# Patient Record
Sex: FEMALE | Race: WHITE | NOT HISPANIC OR LATINO | Employment: OTHER | URBAN - NONMETROPOLITAN AREA
[De-identification: names, ages, dates, MRNs, and addresses within clinical notes are randomized per-mention and may not be internally consistent; named-entity substitution may affect disease eponyms.]

---

## 2023-01-24 PROBLEM — N39.0 UTI (URINARY TRACT INFECTION): Status: ACTIVE | Noted: 2023-01-24

## 2023-01-24 PROBLEM — I10 HYPERTENSION: Status: ACTIVE | Noted: 2023-01-24

## 2023-01-24 PROBLEM — K59.09 CHRONIC CONSTIPATION: Status: ACTIVE | Noted: 2023-01-24

## 2023-01-24 PROBLEM — M43.6 STIFFNESS OF CERVICAL SPINE: Status: ACTIVE | Noted: 2023-01-24

## 2023-01-24 PROBLEM — R26.9 GAIT DIFFICULTY: Status: ACTIVE | Noted: 2023-01-24

## 2023-01-24 PROBLEM — M85.80 OSTEOPENIA: Status: ACTIVE | Noted: 2023-01-24

## 2023-01-24 PROBLEM — K59.09 CHRONIC CONSTIPATION: Status: RESOLVED | Noted: 2023-01-24 | Resolved: 2023-01-24

## 2023-01-24 PROBLEM — Z96.642 CHRONIC HIP PAIN AFTER TOTAL REPLACEMENT OF LEFT HIP JOINT: Status: ACTIVE | Noted: 2023-01-24

## 2023-01-24 PROBLEM — R32 URINARY INCONTINENCE IN FEMALE: Status: ACTIVE | Noted: 2023-01-24

## 2023-01-24 PROBLEM — R06.02 SOB (SHORTNESS OF BREATH) ON EXERTION: Status: ACTIVE | Noted: 2023-01-24

## 2023-01-24 PROBLEM — M25.551 RIGHT HIP PAIN: Status: RESOLVED | Noted: 2023-01-24 | Resolved: 2023-01-24

## 2023-01-24 PROBLEM — K44.9 HERNIA, HIATAL: Status: ACTIVE | Noted: 2023-01-24

## 2023-01-24 PROBLEM — N39.0 UTI (URINARY TRACT INFECTION): Status: RESOLVED | Noted: 2023-01-24 | Resolved: 2023-01-24

## 2023-01-24 PROBLEM — H91.90 HEARING LOSS: Status: ACTIVE | Noted: 2023-01-24

## 2023-01-24 PROBLEM — J30.9 ALLERGIC RHINITIS: Status: ACTIVE | Noted: 2023-01-24

## 2023-01-24 PROBLEM — R60.0 BILATERAL EDEMA OF LOWER EXTREMITY: Status: ACTIVE | Noted: 2023-01-24

## 2023-01-24 PROBLEM — R26.9 GAIT DIFFICULTY: Status: RESOLVED | Noted: 2023-01-24 | Resolved: 2023-01-24

## 2023-01-24 PROBLEM — G89.29 CHRONIC HIP PAIN AFTER TOTAL REPLACEMENT OF LEFT HIP JOINT: Status: ACTIVE | Noted: 2023-01-24

## 2023-01-24 PROBLEM — I25.10 CAD (CORONARY ARTERY DISEASE): Status: ACTIVE | Noted: 2023-01-24

## 2023-01-24 PROBLEM — M25.551 RIGHT HIP PAIN: Status: ACTIVE | Noted: 2023-01-24

## 2023-01-24 PROBLEM — M48.061 LUMBAR SPINAL STENOSIS: Status: ACTIVE | Noted: 2023-01-24

## 2023-01-24 PROBLEM — J30.9 ALLERGIC RHINITIS: Status: RESOLVED | Noted: 2023-01-24 | Resolved: 2023-01-24

## 2023-01-24 PROBLEM — H61.23 BILATERAL IMPACTED CERUMEN: Status: ACTIVE | Noted: 2023-01-24

## 2023-01-24 PROBLEM — D64.9 ANEMIA: Status: ACTIVE | Noted: 2023-01-24

## 2023-01-24 PROBLEM — G25.81 RESTLESS LEG SYNDROME: Status: ACTIVE | Noted: 2023-01-24

## 2023-01-24 PROBLEM — R32 URINARY INCONTINENCE IN FEMALE: Status: RESOLVED | Noted: 2023-01-24 | Resolved: 2023-01-24

## 2023-01-24 PROBLEM — M25.552 CHRONIC HIP PAIN AFTER TOTAL REPLACEMENT OF LEFT HIP JOINT: Status: ACTIVE | Noted: 2023-01-24

## 2023-01-24 PROBLEM — R06.02 SOB (SHORTNESS OF BREATH) ON EXERTION: Status: RESOLVED | Noted: 2023-01-24 | Resolved: 2023-01-24

## 2023-01-24 RX ORDER — FLUTICASONE PROPIONATE 50 MCG
1 SPRAY, SUSPENSION (ML) NASAL 2 TIMES DAILY
COMMUNITY
Start: 2019-11-19

## 2023-01-24 RX ORDER — FUROSEMIDE 40 MG/1
20 TABLET ORAL DAILY
COMMUNITY
Start: 2022-02-07 | End: 2023-03-29 | Stop reason: SDUPTHER

## 2023-01-24 RX ORDER — ATORVASTATIN CALCIUM 20 MG/1
20 TABLET, FILM COATED ORAL
COMMUNITY
Start: 2018-09-21 | End: 2023-10-30 | Stop reason: ALTCHOICE

## 2023-01-24 RX ORDER — CARVEDILOL 12.5 MG/1
12.5 TABLET ORAL
COMMUNITY
End: 2023-11-22 | Stop reason: SDUPTHER

## 2023-01-24 RX ORDER — NALOXONE HYDROCHLORIDE 4 MG/.1ML
4 SPRAY NASAL AS NEEDED
COMMUNITY
Start: 2020-05-21

## 2023-01-24 RX ORDER — PRAMIPEXOLE DIHYDROCHLORIDE 0.5 MG/1
1 TABLET ORAL 2 TIMES DAILY
COMMUNITY
Start: 2018-12-28 | End: 2023-03-29 | Stop reason: SDUPTHER

## 2023-01-24 RX ORDER — NITROGLYCERIN 0.4 MG/1
0.4 TABLET SUBLINGUAL EVERY 5 MIN PRN
COMMUNITY
End: 2024-05-06 | Stop reason: SDUPTHER

## 2023-01-24 RX ORDER — LORATADINE 10 MG/1
1 TABLET ORAL DAILY PRN
COMMUNITY

## 2023-01-24 RX ORDER — ISOSORBIDE MONONITRATE 30 MG/1
1 TABLET, EXTENDED RELEASE ORAL EVERY MORNING
COMMUNITY
End: 2023-09-26 | Stop reason: ALTCHOICE

## 2023-01-24 RX ORDER — ASPIRIN 81 MG/1
81 TABLET ORAL DAILY
COMMUNITY

## 2023-01-24 RX ORDER — OXYCODONE HYDROCHLORIDE 10 MG/1
1 TABLET ORAL 3 TIMES DAILY
COMMUNITY
Start: 2019-10-28 | End: 2023-03-16 | Stop reason: SDUPTHER

## 2023-01-24 RX ORDER — CLOPIDOGREL BISULFATE 75 MG/1
75 TABLET ORAL DAILY
COMMUNITY
Start: 2018-09-21 | End: 2023-09-26 | Stop reason: SDUPTHER

## 2023-01-24 RX ORDER — AMLODIPINE BESYLATE 10 MG/1
10 TABLET ORAL DAILY
COMMUNITY
End: 2023-11-22 | Stop reason: SDUPTHER

## 2023-01-24 RX ORDER — LISINOPRIL 10 MG/1
20 TABLET ORAL DAILY
COMMUNITY
End: 2024-01-24 | Stop reason: SDUPTHER

## 2023-01-24 RX ORDER — PANTOPRAZOLE SODIUM 40 MG/1
1 TABLET, DELAYED RELEASE ORAL DAILY
COMMUNITY
Start: 2018-02-09 | End: 2024-01-24 | Stop reason: SDUPTHER

## 2023-01-25 RX ORDER — ROSUVASTATIN CALCIUM 20 MG/1
20 TABLET, COATED ORAL DAILY
COMMUNITY
End: 2023-10-30 | Stop reason: ALTCHOICE

## 2023-03-08 ENCOUNTER — APPOINTMENT (OUTPATIENT)
Dept: PRIMARY CARE | Facility: CLINIC | Age: 83
End: 2023-03-08
Payer: MEDICARE

## 2023-03-16 DIAGNOSIS — M48.061 SPINAL STENOSIS OF LUMBAR REGION, UNSPECIFIED WHETHER NEUROGENIC CLAUDICATION PRESENT: ICD-10-CM

## 2023-03-16 RX ORDER — OXYCODONE HYDROCHLORIDE 10 MG/1
10 TABLET ORAL 3 TIMES DAILY
Qty: 90 TABLET | Refills: 0 | Status: SHIPPED | OUTPATIENT
Start: 2023-03-16 | End: 2023-04-19 | Stop reason: SDUPTHER

## 2023-03-16 NOTE — TELEPHONE ENCOUNTER
PT CALLED IN STATING SHE HAS LEFT A MESSAGE AND NO ONE HAS GOTTEN BACK WITH HER. PT IS REQUESTING A REFILL ON HER SCRIPT  FOR OXYCODONE TO PLEASE BE SENT TO [facility]. PT STATES SHE IS COMPLETELY OUT. THANK YOU

## 2023-03-27 ENCOUNTER — APPOINTMENT (OUTPATIENT)
Dept: PRIMARY CARE | Facility: CLINIC | Age: 83
End: 2023-03-27
Payer: MEDICARE

## 2023-03-29 ENCOUNTER — OFFICE VISIT (OUTPATIENT)
Dept: PRIMARY CARE | Facility: CLINIC | Age: 83
End: 2023-03-29
Payer: MEDICARE

## 2023-03-29 VITALS
DIASTOLIC BLOOD PRESSURE: 88 MMHG | WEIGHT: 143.8 LBS | HEIGHT: 65 IN | BODY MASS INDEX: 23.96 KG/M2 | SYSTOLIC BLOOD PRESSURE: 128 MMHG | OXYGEN SATURATION: 99 % | HEART RATE: 68 BPM

## 2023-03-29 DIAGNOSIS — G25.81 RESTLESS LEG SYNDROME: ICD-10-CM

## 2023-03-29 DIAGNOSIS — R60.0 BILATERAL EDEMA OF LOWER EXTREMITY: ICD-10-CM

## 2023-03-29 DIAGNOSIS — M48.062 SPINAL STENOSIS OF LUMBAR REGION WITH NEUROGENIC CLAUDICATION: Primary | ICD-10-CM

## 2023-03-29 DIAGNOSIS — L82.0 INFLAMED SEBORRHEIC KERATOSIS: ICD-10-CM

## 2023-03-29 PROBLEM — H61.23 BILATERAL IMPACTED CERUMEN: Status: RESOLVED | Noted: 2023-01-24 | Resolved: 2023-03-29

## 2023-03-29 PROBLEM — M48.061 LUMBAR SPINAL STENOSIS: Status: RESOLVED | Noted: 2023-01-24 | Resolved: 2023-03-29

## 2023-03-29 PROBLEM — H91.90 HEARING LOSS: Status: RESOLVED | Noted: 2023-01-24 | Resolved: 2023-03-29

## 2023-03-29 PROCEDURE — 1036F TOBACCO NON-USER: CPT | Performed by: FAMILY MEDICINE

## 2023-03-29 PROCEDURE — 1159F MED LIST DOCD IN RCRD: CPT | Performed by: FAMILY MEDICINE

## 2023-03-29 PROCEDURE — 3079F DIAST BP 80-89 MM HG: CPT | Performed by: FAMILY MEDICINE

## 2023-03-29 PROCEDURE — 99214 OFFICE O/P EST MOD 30 MIN: CPT | Performed by: FAMILY MEDICINE

## 2023-03-29 PROCEDURE — 3074F SYST BP LT 130 MM HG: CPT | Performed by: FAMILY MEDICINE

## 2023-03-29 RX ORDER — FUROSEMIDE 40 MG/1
20 TABLET ORAL DAILY
Qty: 15 TABLET | Refills: 11 | Status: SHIPPED | OUTPATIENT
Start: 2023-03-29 | End: 2023-06-26 | Stop reason: ALTCHOICE

## 2023-03-29 RX ORDER — PRAMIPEXOLE DIHYDROCHLORIDE 0.5 MG/1
0.5 TABLET ORAL 2 TIMES DAILY
Qty: 60 TABLET | Refills: 11 | Status: SHIPPED | OUTPATIENT
Start: 2023-03-29 | End: 2024-03-08 | Stop reason: SDUPTHER

## 2023-03-29 ASSESSMENT — ENCOUNTER SYMPTOMS
PALPITATIONS: 0
OCCASIONAL FEELINGS OF UNSTEADINESS: 0
SHORTNESS OF BREATH: 1
DEPRESSION: 0
COUGH: 0
LOSS OF SENSATION IN FEET: 0
WHEEZING: 0

## 2023-03-29 ASSESSMENT — PATIENT HEALTH QUESTIONNAIRE - PHQ9
2. FEELING DOWN, DEPRESSED OR HOPELESS: NOT AT ALL
SUM OF ALL RESPONSES TO PHQ9 QUESTIONS 1 AND 2: 0
1. LITTLE INTEREST OR PLEASURE IN DOING THINGS: NOT AT ALL

## 2023-03-29 ASSESSMENT — LIFESTYLE VARIABLES: TOTAL SCORE: 0

## 2023-03-29 NOTE — PATIENT INSTRUCTIONS

## 2023-03-29 NOTE — PROGRESS NOTES
OARRS:  Sami Leggett MD on 3/29/2023  8:05 AM  I have personally reviewed the OARRS report for Tierney Madrid. I have considered the risks of abuse, dependence, addiction and diversion    Is the patient prescribed a combination of a benzodiazepine and opioid?  No    Last Urine Drug Screen / ordered today: No  Recent Results (from the past 62173 hour(s))   OPIATE/OPIOID/BENZO PRESCRIPTION COMPLIANCE    Collection Time: 05/09/22  2:00 PM   Result Value Ref Range    DRUG SCREEN COMMENT URINE SEE BELOW     Creatine, Urine 60.0 mg/dL    Amphetamine Screen, Urine PRESUMPTIVE NEGATIVE NEGATIVE    Barbiturate Screen, Urine PRESUMPTIVE NEGATIVE NEGATIVE    Cannabinoid Screen, Urine PRESUMPTIVE NEGATIVE NEGATIVE    Cocaine Screen, Urine PRESUMPTIVE NEGATIVE NEGATIVE    PCP Screen, Urine PRESUMPTIVE NEGATIVE NEGATIVE    7-Aminoclonazepam <25 Cutoff <25 ng/mL    Alpha-Hydroxyalprazolam <25 Cutoff <25 ng/mL    Alpha-Hydroxymidazolam <25 Cutoff <25 ng/mL    Alprazolam <25 Cutoff <25 ng/mL    Chlordiazepoxide <25 Cutoff <25 ng/mL    Clonazepam <25 Cutoff <25 ng/mL    Diazepam <25 Cutoff <25 ng/mL    Lorazepam <25 Cutoff <25 ng/mL    Midazolam <25 Cutoff <25 ng/mL    Nordiazepam <25 Cutoff <25 ng/mL    Oxazepam <25 Cutoff <25 ng/mL    Temazepam <25 Cutoff <25 ng/mL    Zolpidem <25 Cutoff <25 ng/mL    Zolpidem Metabolite (ZCA) <25 Cutoff <25 ng/mL    6-Acetylmorphine <25 Cutoff <25 ng/mL    Codeine <50 Cutoff <50 ng/mL    Hydrocodone <25 Cutoff <25 ng/mL    Hydromorphone <25 Cutoff <25 ng/mL    Morphine Urine <50 Cutoff <50 ng/mL    Norhydrocodone <25 Cutoff <25 ng/mL    Noroxycodone >1000 (A) Cutoff <25 ng/mL    Oxycodone >2500 (A) Cutoff <25 ng/mL    Oxymorphone >2500 (A) Cutoff <25 ng/mL    Tramadol <50 Cutoff <50 ng/mL    O-Desmethyltramadol <50 Cutoff <50 ng/mL    Fentanyl <2.5 Cutoff<2.5 ng/mL    Norfentanyl <2.5 Cutoff<2.5 ng/mL    METHADONE CONFIRMATION,URINE <25 Cutoff <25 ng/mL    EDDP <25 Cutoff <25 ng/mL     Results  are as expected.     Controlled Substance Agreement:  Date of the Last Agreement:   Reviewed Controlled Substance Agreement including but not limited to the benefits, risks, and alternatives to treatment with a Controlled Substance medication(s).    Opioids:  What is the patient's goal of therapy? To relieve pain and increase function  Is this being achieved with current treatment? yes    I have calculated the patient's Morphine Dose Equivalent (MED):   I have considered referral to Pain Management and/or a specialist, and do not feel it is necessary at this time.    I feel that it is clinically indicated to continue this current medication regimen after consideration of alternative therapies, and other non-opioid treatment.    Opioid Risk Screening:  Family History of Substance Abuse  Alcohol: 0 (3/29/2023  8:00 AM)  Illegal Dru (3/29/2023  8:00 AM)  Prescription Dru (3/29/2023  8:00 AM)    Personal History of Substance Abuse  Alcohol: 0 (3/29/2023  8:00 AM)  Illegal Drugs: 0 (3/29/2023  8:00 AM)  Prescription Drugs: 0 (3/29/2023  8:00 AM)    Patient Age (16-45)  Age (16-45): 0 (3/29/2023  8:00 AM)    History of Preadolescent Sexual Abuse  History of Preadolescent Sexual Abuse: 0 (3/29/2023  8:00 AM)    Psychological Disease  Attention Deficit Disorder, Obsessive Compulsive Disorder, Bipolar, Schizophrenia: 0 (3/29/2023  8:00 AM)  Depression: 0 (3/29/2023  8:00 AM)    Total Score  Total Score: 0 (3/29/2023  8:00 AM)    Total Score Risk Category  TOTAL SCORE CATEGORY: Low Risk (0-3) (3/29/2023  8:00 AM)        Pain Assessment:  Analgesia  What was your pain level on average during the past week?: 9  What was your pain level at its worst during the past week?: 10 - Pain as bad as it can be  What percentage of your pain has been relieved during the past week?: 80 %  Is the amount of pain relief you are now obtaining from your current pain relievers enough to make a real difference in your life?:  "Y  Query to Clinician: Is the patient's pain relief clinically significant?: Yes    Activities of Daily Living  Physical Functioning: Same  Family Relationships: Same  Social Relationships: Same  Mood: Same  Sleep Patterns: Same  Overall Functioning: Same    Adverse Events  Is patient experiencing any side effects from current pain relievers?: N  Patient's Overall Severity of Side Effects: None      Assessment  Is your overall impression that this patient is benefiting from opioid therapy?: Yes  Specific Analgesic Plan: Continue present regimen    Subjective   Patient ID: Tierney Madrid is a 83 y.o. female who presents for 3 mth ov (Has a mole on right side bra line area she would like looked at ).    HPI   HTN    good control with current meds     RLS     Mirapex is helping     edema no changes in edema or wt gain    Controlled with lasix      stale angina    changed cardilogy to  next appt with Dr Maguire   cath with PCI in mid LAD      anemia chronic disease   not iron deficient or b12        Review of Systems   Respiratory:  Positive for shortness of breath. Negative for cough and wheezing.    Cardiovascular:  Negative for chest pain, palpitations and leg swelling.   Skin:         Right upper abd lesion x 6 months changing getting bigger no bleeding catches on bra and clothing        Objective   /88 (BP Location: Left arm, Patient Position: Sitting)   Pulse 68   Ht 1.638 m (5' 4.5\")   Wt 65.2 kg (143 lb 12.8 oz)   SpO2 99%   BMI 24.30 kg/m²     Physical Exam  Vitals reviewed.   Constitutional:       Appearance: Normal appearance.   HENT:      Head: Normocephalic and atraumatic.   Eyes:      Conjunctiva/sclera: Conjunctivae normal.   Cardiovascular:      Rate and Rhythm: Normal rate and regular rhythm.   Pulmonary:      Effort: Pulmonary effort is normal.      Breath sounds: Normal breath sounds.   Musculoskeletal:      Cervical back: Neck supple.      Comments: Gen weakness with standing with slow " gait   Skin:     General: Skin is warm and dry.   Neurological:      General: No focal deficit present.      Mental Status: She is alert and oriented to person, place, and time.      Deep Tendon Reflexes: Reflexes normal.   Psychiatric:         Mood and Affect: Mood normal.         Behavior: Behavior normal.         Thought Content: Thought content normal.         Judgment: Judgment normal.         Assessment/Plan   Problem List Items Addressed This Visit          Nervous    Restless leg syndrome    RESOLVED: Lumbar spinal stenosis - Primary       Musculoskeletal    Bilateral edema of lower extremity     Other Visit Diagnoses       Inflamed seborrheic keratosis                 Patient was identified as a fall risk. Risk prevention instructions provided.

## 2023-04-11 ENCOUNTER — PROCEDURE VISIT (OUTPATIENT)
Dept: PRIMARY CARE | Facility: CLINIC | Age: 83
End: 2023-04-11
Payer: MEDICARE

## 2023-04-11 DIAGNOSIS — L82.0 INFLAMED SEBORRHEIC KERATOSIS: Primary | ICD-10-CM

## 2023-04-11 DIAGNOSIS — M48.062 SPINAL STENOSIS OF LUMBAR REGION WITH NEUROGENIC CLAUDICATION: ICD-10-CM

## 2023-04-11 PROCEDURE — 17110 DESTRUCTION B9 LES UP TO 14: CPT | Performed by: FAMILY MEDICINE

## 2023-04-11 NOTE — PROGRESS NOTES
Patient ID: Tierney Madrid is a 83 y.o. female.    Procedures    Patient here to remove a 1 cm x 3 cm seborrheic keratosis right upper quadrant.  After informed written consent skin was prepped without alcohol  and Betadine 1.5 mL of 1.5% lidocaine with epi infiltrated over the base.  Using a #3 curette the seborrheic keratosis was scraped off electrodesiccation used to control hemostasis recuretted and cauterized a second and third time.  Patient tolerated the procedure well.        2. Inflamed seborrheic keratosis  Procedure only

## 2023-04-19 DIAGNOSIS — M48.061 SPINAL STENOSIS OF LUMBAR REGION, UNSPECIFIED WHETHER NEUROGENIC CLAUDICATION PRESENT: Primary | ICD-10-CM

## 2023-04-19 RX ORDER — OXYCODONE HYDROCHLORIDE 10 MG/1
10 TABLET ORAL 3 TIMES DAILY
Qty: 90 TABLET | Refills: 0 | Status: SHIPPED | OUTPATIENT
Start: 2023-04-19 | End: 2023-05-18 | Stop reason: SDUPTHER

## 2023-04-19 NOTE — TELEPHONE ENCOUNTER
Refill for  oxyCODONE (Roxicodone) 10 mg immediate release tablet needs to be sent to [facility]

## 2023-04-24 ENCOUNTER — OFFICE VISIT (OUTPATIENT)
Dept: PRIMARY CARE | Facility: CLINIC | Age: 83
End: 2023-04-24
Payer: MEDICARE

## 2023-04-24 VITALS
BODY MASS INDEX: 23.57 KG/M2 | HEIGHT: 65 IN | DIASTOLIC BLOOD PRESSURE: 62 MMHG | HEART RATE: 73 BPM | SYSTOLIC BLOOD PRESSURE: 118 MMHG | WEIGHT: 141.5 LBS

## 2023-04-24 DIAGNOSIS — T14.8XXA WOUND OF SKIN: Primary | ICD-10-CM

## 2023-04-24 PROCEDURE — 3078F DIAST BP <80 MM HG: CPT | Performed by: NURSE PRACTITIONER

## 2023-04-24 PROCEDURE — 3074F SYST BP LT 130 MM HG: CPT | Performed by: NURSE PRACTITIONER

## 2023-04-24 PROCEDURE — 1036F TOBACCO NON-USER: CPT | Performed by: NURSE PRACTITIONER

## 2023-04-24 PROCEDURE — 99213 OFFICE O/P EST LOW 20 MIN: CPT | Performed by: NURSE PRACTITIONER

## 2023-04-24 PROCEDURE — 1159F MED LIST DOCD IN RCRD: CPT | Performed by: NURSE PRACTITIONER

## 2023-04-24 RX ORDER — SILVER SULFADIAZINE 10 G/1000G
CREAM TOPICAL
Qty: 20 G | Refills: 0 | Status: SHIPPED | OUTPATIENT
Start: 2023-04-24 | End: 2023-06-26 | Stop reason: ALTCHOICE

## 2023-04-24 NOTE — PROGRESS NOTES
"Subjective   Patient ID: Tierney Madrid is a 83 y.o. female who presents for Skin Tag (Spot that was removed under right breast is irritated.).    On 4/11/23 had removal of seborrheic keratosis right upper quadrant with Dr. Leggett  She is concerned for wound infection, as there has been some drainage and mild pain  Wound has been covered with Band-Aid which is also causing some irritation.  Wound is just under right breast         Review of Systems    Objective   /62 (Patient Position: Sitting)   Pulse 73   Ht 1.638 m (5' 4.5\")   Wt 64.2 kg (141 lb 8 oz)   BMI 23.91 kg/m²     Physical Exam  Constitutional:       Appearance: Normal appearance. She is normal weight.   Cardiovascular:      Rate and Rhythm: Normal rate and regular rhythm.   Pulmonary:      Effort: Pulmonary effort is normal.   Skin:     General: Skin is warm and dry.      Comments: Wound to right under breast, previous skin tag removal with cauterization: wound bed maceration, edges erythema, surrounding tissue pink, no swelling noted.   1.5 X2.0 cm   Neurological:      Mental Status: She is alert and oriented to person, place, and time.         Assessment/Plan   Diagnoses and all orders for this visit:  Wound of skin  -     silver sulfADIAZINE (Silvadene) 1 % cream; Apply to affected area twice a day or with each dressing change.   - Keep wound clean, apply dry gauze loosely, secure with paper tape.     Return is S/Sx of infection occur including increased pain, swelling, redness, fever, or increased drainage     "

## 2023-05-18 DIAGNOSIS — M48.061 SPINAL STENOSIS OF LUMBAR REGION, UNSPECIFIED WHETHER NEUROGENIC CLAUDICATION PRESENT: ICD-10-CM

## 2023-05-18 RX ORDER — OXYCODONE HYDROCHLORIDE 10 MG/1
10 TABLET ORAL 3 TIMES DAILY
Qty: 90 TABLET | Refills: 0 | Status: SHIPPED | OUTPATIENT
Start: 2023-05-18 | End: 2023-06-19 | Stop reason: SDUPTHER

## 2023-06-19 ENCOUNTER — TELEPHONE (OUTPATIENT)
Dept: PRIMARY CARE | Facility: CLINIC | Age: 83
End: 2023-06-19
Payer: MEDICARE

## 2023-06-19 DIAGNOSIS — M48.061 SPINAL STENOSIS OF LUMBAR REGION, UNSPECIFIED WHETHER NEUROGENIC CLAUDICATION PRESENT: ICD-10-CM

## 2023-06-19 RX ORDER — OXYCODONE HYDROCHLORIDE 10 MG/1
10 TABLET ORAL 3 TIMES DAILY
Qty: 90 TABLET | Refills: 0 | Status: SHIPPED | OUTPATIENT
Start: 2023-06-19 | End: 2023-07-18 | Stop reason: SDUPTHER

## 2023-06-26 ENCOUNTER — OFFICE VISIT (OUTPATIENT)
Dept: PRIMARY CARE | Facility: CLINIC | Age: 83
End: 2023-06-26
Payer: MEDICARE

## 2023-06-26 VITALS
OXYGEN SATURATION: 98 % | DIASTOLIC BLOOD PRESSURE: 68 MMHG | HEART RATE: 72 BPM | SYSTOLIC BLOOD PRESSURE: 118 MMHG | BODY MASS INDEX: 23.76 KG/M2 | WEIGHT: 142.6 LBS | HEIGHT: 65 IN

## 2023-06-26 DIAGNOSIS — G25.81 RESTLESS LEG SYNDROME: ICD-10-CM

## 2023-06-26 DIAGNOSIS — F51.01 PRIMARY INSOMNIA: ICD-10-CM

## 2023-06-26 DIAGNOSIS — M48.062 SPINAL STENOSIS OF LUMBAR REGION WITH NEUROGENIC CLAUDICATION: ICD-10-CM

## 2023-06-26 DIAGNOSIS — R60.0 BILATERAL EDEMA OF LOWER EXTREMITY: ICD-10-CM

## 2023-06-26 DIAGNOSIS — Z00.00 ROUTINE GENERAL MEDICAL EXAMINATION AT HEALTH CARE FACILITY: Primary | ICD-10-CM

## 2023-06-26 DIAGNOSIS — Z51.81 MEDICATION MONITORING ENCOUNTER: ICD-10-CM

## 2023-06-26 PROCEDURE — 99214 OFFICE O/P EST MOD 30 MIN: CPT | Performed by: FAMILY MEDICINE

## 2023-06-26 PROCEDURE — G0439 PPPS, SUBSEQ VISIT: HCPCS | Performed by: FAMILY MEDICINE

## 2023-06-26 PROCEDURE — 3078F DIAST BP <80 MM HG: CPT | Performed by: FAMILY MEDICINE

## 2023-06-26 PROCEDURE — 80307 DRUG TEST PRSMV CHEM ANLYZR: CPT

## 2023-06-26 PROCEDURE — 80358 DRUG SCREENING METHADONE: CPT

## 2023-06-26 PROCEDURE — 1160F RVW MEDS BY RX/DR IN RCRD: CPT | Performed by: FAMILY MEDICINE

## 2023-06-26 PROCEDURE — 1159F MED LIST DOCD IN RCRD: CPT | Performed by: FAMILY MEDICINE

## 2023-06-26 PROCEDURE — 80354 DRUG SCREENING FENTANYL: CPT

## 2023-06-26 PROCEDURE — 1036F TOBACCO NON-USER: CPT | Performed by: FAMILY MEDICINE

## 2023-06-26 PROCEDURE — 3074F SYST BP LT 130 MM HG: CPT | Performed by: FAMILY MEDICINE

## 2023-06-26 PROCEDURE — 80361 OPIATES 1 OR MORE: CPT

## 2023-06-26 PROCEDURE — 1170F FXNL STATUS ASSESSED: CPT | Performed by: FAMILY MEDICINE

## 2023-06-26 PROCEDURE — 80346 BENZODIAZEPINES1-12: CPT

## 2023-06-26 PROCEDURE — 80365 DRUG SCREENING OXYCODONE: CPT

## 2023-06-26 PROCEDURE — 80373 DRUG SCREENING TRAMADOL: CPT

## 2023-06-26 PROCEDURE — 80368 SEDATIVE HYPNOTICS: CPT

## 2023-06-26 RX ORDER — TRAZODONE HYDROCHLORIDE 50 MG/1
50 TABLET ORAL NIGHTLY
Qty: 30 TABLET | Refills: 0 | Status: SHIPPED | OUTPATIENT
Start: 2023-06-26 | End: 2023-10-20 | Stop reason: WASHOUT

## 2023-06-26 RX ORDER — FUROSEMIDE 20 MG/1
20 TABLET ORAL DAILY
Qty: 30 TABLET | Refills: 11 | Status: SHIPPED | OUTPATIENT
Start: 2023-06-26 | End: 2024-05-20

## 2023-06-26 ASSESSMENT — ENCOUNTER SYMPTOMS
CONSTIPATION: 0
DIARRHEA: 0
COUGH: 0
HEMATURIA: 0
NAUSEA: 0
DYSURIA: 0
ABDOMINAL PAIN: 0

## 2023-06-26 ASSESSMENT — PATIENT HEALTH QUESTIONNAIRE - PHQ9
SUM OF ALL RESPONSES TO PHQ9 QUESTIONS 1 AND 2: 0
2. FEELING DOWN, DEPRESSED OR HOPELESS: NOT AT ALL
1. LITTLE INTEREST OR PLEASURE IN DOING THINGS: NOT AT ALL

## 2023-06-26 ASSESSMENT — ACTIVITIES OF DAILY LIVING (ADL)
TAKING_MEDICATION: INDEPENDENT
GROCERY_SHOPPING: NEEDS ASSISTANCE
MANAGING_FINANCES: INDEPENDENT
DRESSING: INDEPENDENT
BATHING: INDEPENDENT
DOING_HOUSEWORK: NEEDS ASSISTANCE

## 2023-06-26 NOTE — PATIENT INSTRUCTIONS

## 2023-06-26 NOTE — PROGRESS NOTES
Subjective   Reason for Visit: Tierney Madrid is an 83 y.o. female here for a Medicare Wellness visit.     Past Medical, Surgical, and Family History reviewed and updated in chart.    Reviewed all medications by prescribing practitioner or clinical pharmacist (such as prescriptions, OTCs, herbal therapies and supplements) and documented in the medical record.    HPI  OARRS:  Sami Leggett MD on 6/26/23  I have personally reviewed the OARRS report for Tierney Madrid. I have considered the risks of abuse, dependence, addiction and diversion     Is the patient prescribed a combination of a benzodiazepine and opioid?  No     Last Urine Drug Screen / ordered today: yes  Recent Results         Recent Results (from the past 36917 hour(s))   OPIATE/OPIOID/BENZO PRESCRIPTION COMPLIANCE     Collection Time: 05/09/22  2:00 PM   Result Value Ref Range     DRUG SCREEN COMMENT URINE SEE BELOW       Creatine, Urine 60.0 mg/dL     Amphetamine Screen, Urine PRESUMPTIVE NEGATIVE NEGATIVE     Barbiturate Screen, Urine PRESUMPTIVE NEGATIVE NEGATIVE     Cannabinoid Screen, Urine PRESUMPTIVE NEGATIVE NEGATIVE     Cocaine Screen, Urine PRESUMPTIVE NEGATIVE NEGATIVE     PCP Screen, Urine PRESUMPTIVE NEGATIVE NEGATIVE     7-Aminoclonazepam <25 Cutoff <25 ng/mL     Alpha-Hydroxyalprazolam <25 Cutoff <25 ng/mL     Alpha-Hydroxymidazolam <25 Cutoff <25 ng/mL     Alprazolam <25 Cutoff <25 ng/mL     Chlordiazepoxide <25 Cutoff <25 ng/mL     Clonazepam <25 Cutoff <25 ng/mL     Diazepam <25 Cutoff <25 ng/mL     Lorazepam <25 Cutoff <25 ng/mL     Midazolam <25 Cutoff <25 ng/mL     Nordiazepam <25 Cutoff <25 ng/mL     Oxazepam <25 Cutoff <25 ng/mL     Temazepam <25 Cutoff <25 ng/mL     Zolpidem <25 Cutoff <25 ng/mL     Zolpidem Metabolite (ZCA) <25 Cutoff <25 ng/mL     6-Acetylmorphine <25 Cutoff <25 ng/mL     Codeine <50 Cutoff <50 ng/mL     Hydrocodone <25 Cutoff <25 ng/mL     Hydromorphone <25 Cutoff <25 ng/mL     Morphine Urine <50 Cutoff <50  ng/mL     Norhydrocodone <25 Cutoff <25 ng/mL     Noroxycodone >1000 (A) Cutoff <25 ng/mL     Oxycodone >2500 (A) Cutoff <25 ng/mL     Oxymorphone >2500 (A) Cutoff <25 ng/mL     Tramadol <50 Cutoff <50 ng/mL     O-Desmethyltramadol <50 Cutoff <50 ng/mL     Fentanyl <2.5 Cutoff<2.5 ng/mL     Norfentanyl <2.5 Cutoff<2.5 ng/mL     METHADONE CONFIRMATION,URINE <25 Cutoff <25 ng/mL     EDDP <25 Cutoff <25 ng/mL         Results are as expected.      Controlled Substance Agreement:  Date of the Last Agreement: Feb 29, 2023  Reviewed Controlled Substance Agreement including but not limited to the benefits, risks, and alternatives to treatment with a Controlled Substance medication(s).     Opioids:  What is the patient's goal of therapy? To relieve pain and increase function  Is this being achieved with current treatment? yes     I have calculated the patient's Morphine Dose Equivalent (MED):   I have considered referral to Pain Management and/or a specialist, and do not feel it is necessary at this time.     I feel that it is clinically indicated to continue this current medication regimen after consideration of alternative therapies, and other non-opioid treatment.     Opioid Risk Screening: 3/39/23     Pain Assessment:  Analgesia  What was your pain level on average during the past week?: 9  What was your pain level at its worst during the past week?: 10 - Pain as bad as it can be  What percentage of your pain has been relieved during the past week?: 80 %  Is the amount of pain relief you are now obtaining from your current pain relievers enough to make a real difference in your life?: Y  Query to Clinician: Is the patient's pain relief clinically significant?: Yes     Activities of Daily Living  Physical Functioning: Same  Family Relationships: Same  Social Relationships: Same  Mood: Same  Sleep Patterns: Same  Overall Functioning: Same     Adverse Events  Is patient experiencing any side effects from current pain  relievers?: N  Patient's Overall Severity of Side Effects: None        Assessment  Is your overall impression that this patient is benefiting from opioid therapy?: Yes  Specific Analgesic Plan: Continue present regimen  Patient Care Team:  Sami Leggett MD as PCP - General     Expand All Collapse All  OARRS:  Sami Leggett MD on 3/29/2023  8:05 AM  I have personally reviewed the OARRS report for Tierney Madrid. I have considered the risks of abuse, dependence, addiction and diversion     Is the patient prescribed a combination of a benzodiazepine and opioid?  No     Last Urine Drug Screen / ordered today: No  Recent Results         Recent Results (from the past 53208 hour(s))   OPIATE/OPIOID/BENZO PRESCRIPTION COMPLIANCE     Collection Time: 05/09/22  2:00 PM   Result Value Ref Range     DRUG SCREEN COMMENT URINE SEE BELOW       Creatine, Urine 60.0 mg/dL     Amphetamine Screen, Urine PRESUMPTIVE NEGATIVE NEGATIVE     Barbiturate Screen, Urine PRESUMPTIVE NEGATIVE NEGATIVE     Cannabinoid Screen, Urine PRESUMPTIVE NEGATIVE NEGATIVE     Cocaine Screen, Urine PRESUMPTIVE NEGATIVE NEGATIVE     PCP Screen, Urine PRESUMPTIVE NEGATIVE NEGATIVE     7-Aminoclonazepam <25 Cutoff <25 ng/mL     Alpha-Hydroxyalprazolam <25 Cutoff <25 ng/mL     Alpha-Hydroxymidazolam <25 Cutoff <25 ng/mL     Alprazolam <25 Cutoff <25 ng/mL     Chlordiazepoxide <25 Cutoff <25 ng/mL     Clonazepam <25 Cutoff <25 ng/mL     Diazepam <25 Cutoff <25 ng/mL     Lorazepam <25 Cutoff <25 ng/mL     Midazolam <25 Cutoff <25 ng/mL     Nordiazepam <25 Cutoff <25 ng/mL     Oxazepam <25 Cutoff <25 ng/mL     Temazepam <25 Cutoff <25 ng/mL     Zolpidem <25 Cutoff <25 ng/mL     Zolpidem Metabolite (ZCA) <25 Cutoff <25 ng/mL     6-Acetylmorphine <25 Cutoff <25 ng/mL     Codeine <50 Cutoff <50 ng/mL     Hydrocodone <25 Cutoff <25 ng/mL     Hydromorphone <25 Cutoff <25 ng/mL     Morphine Urine <50 Cutoff <50 ng/mL     Norhydrocodone <25 Cutoff <25 ng/mL      Noroxycodone >1000 (A) Cutoff <25 ng/mL     Oxycodone >2500 (A) Cutoff <25 ng/mL     Oxymorphone >2500 (A) Cutoff <25 ng/mL     Tramadol <50 Cutoff <50 ng/mL     O-Desmethyltramadol <50 Cutoff <50 ng/mL     Fentanyl <2.5 Cutoff<2.5 ng/mL     Norfentanyl <2.5 Cutoff<2.5 ng/mL     METHADONE CONFIRMATION,URINE <25 Cutoff <25 ng/mL     EDDP <25 Cutoff <25 ng/mL         Results are as expected.      Controlled Substance Agreement:  Date of the Last Agreement: 6/26/2023  Reviewed Controlled Substance Agreement including but not limited to the benefits, risks, and alternatives to treatment with a Controlled Substance medication(s).     Opioids:  What is the patient's goal of therapy? To relieve pain and increase function  Is this being achieved with current treatment? yes     I have calculated the patient's Morphine Dose Equivalent (MED):   I have considered referral to Pain Management and/or a specialist, and do not feel it is necessary at this time.     I feel that it is clinically indicated to continue this current medication regimen after consideration of alternative therapies, and other non-opioid treatment.     Opioid Risk Screening  Total Score  Total Score: 0 (3/29/2023  8:00 AM)  Total Score Risk Category  TOTAL SCORE CATEGORY: Low Risk (0-3) (3/29/2023  8:00 AM)       Pain Assessment:  Analgesia  What was your pain level on average during the past week?: 8  What was your pain level at its worst during the past week?: 10   What percentage of your pain has been relieved during the past week?: 80 %  Is the amount of pain relief you are now obtaining from your current pain relievers enough to make a real difference in your life?: Y  Query to Clinician: Is the patient's pain relief clinically significant?: Yes     Activities of Daily Living  Physical Functioning: better  Family Relationships: Same  Social Relationships: Same  Mood: better  Sleep Patterns: Same  Overall Functioning:  better     Adverse Events  Is  "patient experiencing any side effects from current pain relievers?: N  Patient's Overall Severity of Side Effects: None        Assessment  Is your overall impression that this patient is benefiting from opioid therapy?: Yes  Specific Analgesic Plan: Continue present regimen        Subjective []Expand by Default  Patient ID: Tierney Madrid is a 83 y.o. female who presents for 3 mth ov (Has a mole on right side bra line area she would like looked at ).     HPI   Fall over 6 months ago     Last ED visit may 29, 23 for fever cp and diarrhea       Treated nausea and given IVF     HTN    good control with current meds     RLS     [employer] is helping   Insomnia wakes up every 20 minutes   Tried otc melatonin   Questions on quviviq schedule 4        edema no changes in edema or wt gain    Controlled with lasix      stale angina    has seen Dr White now on lisinopril 20 mg    cath with PCI in mid LAD      anemia chronic disease   not iron deficient or b1          Pneumovax 23: Pneumovax 23 vaccine was previously given and she believes in [place].   Prevnar 13: 2/20/2020.   Shingles Vaccine: Shingles vaccine was recommended.   Breast cancer screening: screening not indicated and no changes in sbe.   Cervical cancer screening: screening not indicated.   Osteoporosis screening: screening is current and 4/ 2021. Does not want to repeat     Review of Systems   Respiratory:  Negative for cough.    Cardiovascular:  Positive for chest pain (off and on stable angina controlled with angina).   Gastrointestinal:  Negative for abdominal pain, constipation, diarrhea and nausea.   Genitourinary:  Negative for dysuria and hematuria.       Objective   Vitals:  /68   Pulse 72   Ht 1.638 m (5' 4.5\")   Wt 64.7 kg (142 lb 9.6 oz)   SpO2 98%   BMI 24.10 kg/m²       Physical Exam  Vitals reviewed.   Constitutional:       Appearance: Normal appearance.   HENT:      Head: Normocephalic and atraumatic.   Eyes:      Conjunctiva/sclera: " Conjunctivae normal.   Cardiovascular:      Rate and Rhythm: Normal rate and regular rhythm.   Pulmonary:      Effort: Pulmonary effort is normal.      Breath sounds: Normal breath sounds.   Musculoskeletal:      Cervical back: Neck supple.   Skin:     General: Skin is warm and dry.   Neurological:      General: No focal deficit present.      Mental Status: She is alert and oriented to person, place, and time.   Psychiatric:         Mood and Affect: Mood normal.         Behavior: Behavior normal.         Thought Content: Thought content normal.         Judgment: Judgment normal.         Assessment/Plan   Problem List Items Addressed This Visit       Restless leg syndrome    Bilateral edema of lower extremity    Relevant Medications    furosemide (Lasix) 20 mg tablet     Other Visit Diagnoses       Routine general medical examination at health care facility    -  Primary    Medication monitoring encounter        Relevant Orders    Opiate/Opioid/Benzo Extended Prescription Compliance    Primary insomnia        Relevant Medications    traZODone (Desyrel) 50 mg tablet    Spinal stenosis of lumbar region with neurogenic claudication                 Patient was identified as a fall risk. Risk prevention instructions provided.

## 2023-07-03 LAB
6-ACETYLMORPHINE: <25 NG/ML
7-AMINOCLONAZEPAM: <25 NG/ML
ALPHA-HYDROXYALPRAZOLAM: <25 NG/ML
ALPHA-HYDROXYMIDAZOLAM: <25 NG/ML
ALPRAZOLAM: <25 NG/ML
AMPHETAMINE (PRESENCE) IN URINE BY SCREEN METHOD: ABNORMAL
BARBITURATES PRESENCE IN URINE BY SCREEN METHOD: ABNORMAL
CANNABINOIDS IN URINE BY SCREEN METHOD: ABNORMAL
CHLORDIAZEPOXIDE: <25 NG/ML
CLONAZEPAM: <25 NG/ML
COCAINE (PRESENCE) IN URINE BY SCREEN METHOD: ABNORMAL
CODEINE: <50 NG/ML
CREATINE, URINE FOR DRUG: 33.7 MG/DL
DIAZEPAM: <25 NG/ML
DRUG SCREEN COMMENT URINE: ABNORMAL
EDDP: <25 NG/ML
FENTANYL CONFIRMATION, URINE: <2.5 NG/ML
HYDROCODONE: <25 NG/ML
HYDROMORPHONE: <25 NG/ML
LORAZEPAM: <25 NG/ML
METHADONE CONFIRMATION,URINE: <25 NG/ML
MIDAZOLAM: <25 NG/ML
MORPHINE URINE: <50 NG/ML
NORDIAZEPAM: <25 NG/ML
NORFENTANYL: <2.5 NG/ML
NORHYDROCODONE: <25 NG/ML
NOROXYCODONE: >1000 NG/ML
O-DESMETHYLTRAMADOL: <50 NG/ML
OXAZEPAM: <25 NG/ML
OXYCODONE: 2143 NG/ML
OXYMORPHONE: 1123 NG/ML
PHENCYCLIDINE (PRESENCE) IN URINE BY SCREEN METHOD: ABNORMAL
TEMAZEPAM: <25 NG/ML
TRAMADOL: <50 NG/ML
ZOLPIDEM METABOLITE (ZCA): <25 NG/ML
ZOLPIDEM: <25 NG/ML

## 2023-07-18 DIAGNOSIS — M48.061 SPINAL STENOSIS OF LUMBAR REGION, UNSPECIFIED WHETHER NEUROGENIC CLAUDICATION PRESENT: ICD-10-CM

## 2023-07-18 RX ORDER — OXYCODONE HYDROCHLORIDE 10 MG/1
10 TABLET ORAL 3 TIMES DAILY
Qty: 90 TABLET | Refills: 0 | Status: SHIPPED | OUTPATIENT
Start: 2023-07-18 | End: 2023-08-17 | Stop reason: SDUPTHER

## 2023-08-10 ENCOUNTER — OFFICE VISIT (OUTPATIENT)
Dept: PRIMARY CARE | Facility: CLINIC | Age: 83
End: 2023-08-10
Payer: MEDICARE

## 2023-08-10 VITALS
HEART RATE: 74 BPM | WEIGHT: 143.1 LBS | OXYGEN SATURATION: 99 % | SYSTOLIC BLOOD PRESSURE: 136 MMHG | BODY MASS INDEX: 23.84 KG/M2 | DIASTOLIC BLOOD PRESSURE: 80 MMHG | HEIGHT: 65 IN

## 2023-08-10 DIAGNOSIS — H61.23 BILATERAL IMPACTED CERUMEN: ICD-10-CM

## 2023-08-10 DIAGNOSIS — H60.93 OTITIS EXTERNA OF BOTH EARS, UNSPECIFIED CHRONICITY, UNSPECIFIED TYPE: Primary | ICD-10-CM

## 2023-08-10 PROCEDURE — 3075F SYST BP GE 130 - 139MM HG: CPT | Performed by: FAMILY MEDICINE

## 2023-08-10 PROCEDURE — 1036F TOBACCO NON-USER: CPT | Performed by: FAMILY MEDICINE

## 2023-08-10 PROCEDURE — 3079F DIAST BP 80-89 MM HG: CPT | Performed by: FAMILY MEDICINE

## 2023-08-10 PROCEDURE — 1159F MED LIST DOCD IN RCRD: CPT | Performed by: FAMILY MEDICINE

## 2023-08-10 PROCEDURE — 69209 REMOVE IMPACTED EAR WAX UNI: CPT | Performed by: FAMILY MEDICINE

## 2023-08-10 PROCEDURE — 1160F RVW MEDS BY RX/DR IN RCRD: CPT | Performed by: FAMILY MEDICINE

## 2023-08-10 RX ORDER — NEOMYCIN SULFATE, POLYMYXIN B SULFATE, HYDROCORTISONE 3.5; 10000; 1 MG/ML; [USP'U]/ML; MG/ML
3 SOLUTION/ DROPS AURICULAR (OTIC) 3 TIMES DAILY
Qty: 10 ML | Refills: 0 | Status: SHIPPED | OUTPATIENT
Start: 2023-08-10 | End: 2023-08-13

## 2023-08-10 NOTE — PROGRESS NOTES
"Subjective   Patient ID: Tierney Madrid is a 83 y.o. female who presents for pt. c/o both ears being plugged up..    HPI   Wears hearing aids and plugged x 2 weeks   Has noticed a hearing loss   Review of Systems    Objective   /80   Pulse 74   Ht 1.638 m (5' 4.5\")   Wt 64.9 kg (143 lb 1.6 oz)   SpO2 99%   BMI 24.18 kg/m²     Physical Exam  Bilateral Eac with cerumen impaction   Cleared with currettage and forceps  EAC dry and red     Assessment/Plan   Diagnoses and all orders for this visit:  Otitis externa of both ears, unspecified chronicity, unspecified type  -     neomycin-polymyxin-HC (Cortisporin) otic solution; Administer 3 drops into each ear 3 times a day for 3 days.  Bilateral impacted cerumen         "

## 2023-08-17 DIAGNOSIS — M48.061 SPINAL STENOSIS OF LUMBAR REGION, UNSPECIFIED WHETHER NEUROGENIC CLAUDICATION PRESENT: ICD-10-CM

## 2023-08-17 RX ORDER — OXYCODONE HYDROCHLORIDE 10 MG/1
10 TABLET ORAL 3 TIMES DAILY
Qty: 90 TABLET | Refills: 0 | Status: SHIPPED | OUTPATIENT
Start: 2023-08-17 | End: 2023-09-18 | Stop reason: SDUPTHER

## 2023-09-18 DIAGNOSIS — M48.061 SPINAL STENOSIS OF LUMBAR REGION, UNSPECIFIED WHETHER NEUROGENIC CLAUDICATION PRESENT: ICD-10-CM

## 2023-09-18 RX ORDER — OXYCODONE HYDROCHLORIDE 10 MG/1
10 TABLET ORAL 3 TIMES DAILY
Qty: 90 TABLET | Refills: 0 | Status: SHIPPED | OUTPATIENT
Start: 2023-09-18 | End: 2023-10-17 | Stop reason: SDUPTHER

## 2023-09-26 ENCOUNTER — OFFICE VISIT (OUTPATIENT)
Dept: PRIMARY CARE | Facility: CLINIC | Age: 83
End: 2023-09-26
Payer: MEDICARE

## 2023-09-26 VITALS
HEART RATE: 84 BPM | BODY MASS INDEX: 23.51 KG/M2 | WEIGHT: 141.1 LBS | HEIGHT: 65 IN | DIASTOLIC BLOOD PRESSURE: 78 MMHG | SYSTOLIC BLOOD PRESSURE: 146 MMHG

## 2023-09-26 DIAGNOSIS — G89.29 CHRONIC HIP PAIN AFTER TOTAL REPLACEMENT OF LEFT HIP JOINT: ICD-10-CM

## 2023-09-26 DIAGNOSIS — M25.552 CHRONIC HIP PAIN AFTER TOTAL REPLACEMENT OF LEFT HIP JOINT: ICD-10-CM

## 2023-09-26 DIAGNOSIS — Z96.642 CHRONIC HIP PAIN AFTER TOTAL REPLACEMENT OF LEFT HIP JOINT: ICD-10-CM

## 2023-09-26 DIAGNOSIS — I10 HYPERTENSION, UNSPECIFIED TYPE: ICD-10-CM

## 2023-09-26 DIAGNOSIS — G25.81 RESTLESS LEG SYNDROME: Primary | ICD-10-CM

## 2023-09-26 PROCEDURE — 1159F MED LIST DOCD IN RCRD: CPT | Performed by: FAMILY MEDICINE

## 2023-09-26 PROCEDURE — 3078F DIAST BP <80 MM HG: CPT | Performed by: FAMILY MEDICINE

## 2023-09-26 PROCEDURE — 1036F TOBACCO NON-USER: CPT | Performed by: FAMILY MEDICINE

## 2023-09-26 PROCEDURE — 1160F RVW MEDS BY RX/DR IN RCRD: CPT | Performed by: FAMILY MEDICINE

## 2023-09-26 PROCEDURE — 99214 OFFICE O/P EST MOD 30 MIN: CPT | Performed by: FAMILY MEDICINE

## 2023-09-26 PROCEDURE — 3077F SYST BP >= 140 MM HG: CPT | Performed by: FAMILY MEDICINE

## 2023-09-26 RX ORDER — CLOPIDOGREL BISULFATE 75 MG/1
75 TABLET ORAL DAILY
Qty: 30 TABLET | Refills: 11 | Status: SHIPPED | OUTPATIENT
Start: 2023-09-26 | End: 2024-09-25

## 2023-09-26 RX ORDER — POTASSIUM CHLORIDE 750 MG/1
10 TABLET, FILM COATED, EXTENDED RELEASE ORAL DAILY
Qty: 30 TABLET | Refills: 2 | Status: SHIPPED | OUTPATIENT
Start: 2023-09-26 | End: 2023-10-20 | Stop reason: WASHOUT

## 2023-09-26 RX ORDER — ISOSORBIDE MONONITRATE 60 MG/1
60 TABLET, EXTENDED RELEASE ORAL DAILY
COMMUNITY
Start: 2023-08-26 | End: 2023-09-26 | Stop reason: SDUPTHER

## 2023-09-26 RX ORDER — ISOSORBIDE MONONITRATE 60 MG/1
60 TABLET, EXTENDED RELEASE ORAL DAILY
Qty: 30 TABLET | Refills: 11 | Status: SHIPPED | OUTPATIENT
Start: 2023-09-26 | End: 2024-09-25

## 2023-09-26 NOTE — PROGRESS NOTES
Subjective   Patient ID: Tierney Madrid is a 83 y.o. female who presents for 90 days. (Pt. C/o SOB).    HPI   OARRS:  Sami Leggett MD on 926/23  I have personally reviewed the OARRS report for Tierney Madrid. I have considered the risks of abuse, dependence, addiction and diversion     Is the patient prescribed a combination of a benzodiazepine and opioid?  No     Last Urine Drug Screen / ordered today: no done 6/26/2023  Recent Results                                                                                                                                                                                                                                                      Results are as expected.      Controlled Substance Agreement:  Date of the Last Agreement: Feb 29, 2023  Reviewed Controlled Substance Agreement including but not limited to the benefits, risks, and alternatives to treatment with a Controlled Substance medication(s).     Opioids:  What is the patient's goal of therapy? To relieve pain and increase function  Is this being achieved with current treatment? yes     I have calculated the patient's Morphine Dose Equivalent (MED):   I have considered referral to Pain Management and/or a specialist, and do not feel it is necessary at this time.     I feel that it is clinically indicated to continue this current medication regimen after consideration of alternative therapies, and other non-opioid treatment.     Opioid Risk Screening: 3/39/23     Pain Assessment:  Analgesia  What was your pain level on average during the past week?: 7.5  What was your pain level at its worst during the past week?: 10  What percentage of your pain has been relieved during the past week?: 80 %  Is the amount of pain relief you are now obtaining from your current pain relievers enough to make a real difference in your life?: Y  Query to Clinician: Is the patient's pain relief clinically significant?: Yes     Activities  "of Daily Living  Physical Functioning: better   Family Relationships: Same  Social Relationships: Same  Mood: better   Sleep Patterns: Same  Overall Functioning: better      Adverse Events  Is patient experiencing any side effects from current pain relievers?: N  Patient's Overall Severity of Side Effects: None        Assessment  Is your overall impression that this patient is benefiting from opioid therapy?: Yes  Specific Analgesic Plan: Continue present regimen  Patient Care Team:  Sami Leggett MD as PCP - General       ED visit 8/30/20123  neg cp work up        HTN labile mandi with bad HA   Tired and feels out of breath    borderline  control with current meds     RLS    Mirapex is helping        Still not sleep well          Last night fall asleep      edema no changes in edema or wt gain    Controlled with lasix low potassium will add potassium      stable angina    has seen Dr White now on lisinopril 20 mg    cath with PCI in mid LAD      anemia chronic disease   not iron deficient or b1   LAST  cbc in ed has improved to 11.8         Review of Systems    Objective   /78   Pulse 84   Ht 1.638 m (5' 4.5\")   Wt 64 kg (141 lb 1.6 oz)   BMI 23.85 kg/m²     Physical Exam  Vitals reviewed.   Constitutional:       Appearance: Normal appearance.   HENT:      Head: Normocephalic and atraumatic.   Eyes:      Conjunctiva/sclera: Conjunctivae normal.   Cardiovascular:      Rate and Rhythm: Normal rate and regular rhythm.   Pulmonary:      Effort: Pulmonary effort is normal.      Breath sounds: Normal breath sounds.   Musculoskeletal:      Cervical back: Neck supple.      Right lower leg: No edema.      Left lower leg: No edema.   Skin:     General: Skin is warm and dry.   Neurological:      General: No focal deficit present.      Mental Status: She is alert and oriented to person, place, and time.   Psychiatric:         Mood and Affect: Mood normal.         Behavior: Behavior normal.         Thought Content: " Thought content normal.         Judgment: Judgment normal.         Assessment/Plan   Diagnoses and all orders for this visit:  Restless leg syndrome  Hypertension, unspecified type  -     isosorbide mononitrate ER (Imdur) 60 mg 24 hr tablet; Take 1 tablet (60 mg) by mouth once daily.  -     clopidogrel (Plavix) 75 mg tablet; Take 1 tablet (75 mg) by mouth once daily.  -     potassium chloride CR (Klor-Con) 10 mEq ER tablet; Take 1 tablet (10 mEq) by mouth once daily. Do not crush, chew, or split.  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

## 2023-10-12 ENCOUNTER — TELEPHONE (OUTPATIENT)
Dept: CARDIOLOGY | Facility: CLINIC | Age: 83
End: 2023-10-12
Payer: MEDICARE

## 2023-10-12 NOTE — TELEPHONE ENCOUNTER
MALIKA CALLED THAT SHE HAS DENTAL EXTRACTIONS DONE ON 10/25/2023 AND THAT SHE NEEDS CLEARANCE FROM DR. PHILLIPS TO HAVE THIS DONE AND TO STOP TAKING HER BLOOD THINNERS BEFORE THIS PROCEDURE.    SHE NEEDS THE CLEARANCE FAXED [phone] AND ALSO ASKS THAT SOMEONE CALL HER BACK ONCE THIS IS DONE.

## 2023-10-17 DIAGNOSIS — M48.061 SPINAL STENOSIS OF LUMBAR REGION, UNSPECIFIED WHETHER NEUROGENIC CLAUDICATION PRESENT: ICD-10-CM

## 2023-10-17 RX ORDER — OXYCODONE HYDROCHLORIDE 10 MG/1
10 TABLET ORAL 3 TIMES DAILY
Qty: 90 TABLET | Refills: 0 | Status: SHIPPED | OUTPATIENT
Start: 2023-10-17 | End: 2023-11-17 | Stop reason: SDUPTHER

## 2023-10-19 ENCOUNTER — PHARMACY VISIT (OUTPATIENT)
Dept: PHARMACY | Facility: CLINIC | Age: 83
End: 2023-10-19

## 2023-10-19 ENCOUNTER — DOCUMENTATION (OUTPATIENT)
Dept: PRIMARY CARE | Facility: CLINIC | Age: 83
End: 2023-10-19
Payer: MEDICARE

## 2023-10-19 ENCOUNTER — TELEPHONE (OUTPATIENT)
Dept: PRIMARY CARE | Facility: CLINIC | Age: 83
End: 2023-10-19
Payer: MEDICARE

## 2023-10-19 DIAGNOSIS — U07.1 COVID-19: Primary | ICD-10-CM

## 2023-10-19 PROCEDURE — RXMED WILLOW AMBULATORY MEDICATION CHARGE

## 2023-10-19 NOTE — PROGRESS NOTES
Spoke with pt. States COVID symptoms similar to  started yesterday. Discussed with Dr Leggett. Kidney function good in August. Will prescribe her Paxlovid. Drug interaction reviewed with pharmacist and pt.

## 2023-10-20 ENCOUNTER — TELEMEDICINE (OUTPATIENT)
Dept: PRIMARY CARE | Facility: CLINIC | Age: 83
End: 2023-10-20
Payer: MEDICARE

## 2023-10-20 DIAGNOSIS — U07.1 COVID-19 VIRUS INFECTION: Primary | ICD-10-CM

## 2023-10-20 PROCEDURE — 99213 OFFICE O/P EST LOW 20 MIN: CPT | Performed by: NURSE PRACTITIONER

## 2023-10-20 NOTE — PROGRESS NOTES
Subjective   Patient ID: Tierney Madrid is a 83 y.o. female who presents for Covid +.    COVID +: 10/18/2020  Sx onset: 10/18/2023  Sx include: Cough, HA, fever (101-102), body aches, nasal congestion.   Vaccine status: vaccinated with booster  No previous COVID infection   also has COVID, his Sx onset was 2 days prior to patient   Started Paxlovid yesterday              Review of Systems   Constitutional:  Positive for chills, diaphoresis and fever.   HENT:  Positive for congestion.    Respiratory:  Positive for cough. Negative for shortness of breath.    Cardiovascular:  Negative for chest pain.   Gastrointestinal:  Negative for diarrhea, nausea and vomiting.   Musculoskeletal:  Positive for myalgias.   Neurological:  Positive for headaches.       Objective   There were no vitals taken for this visit.    Physical Exam  Constitutional:       General: She is not in acute distress.     Appearance: Normal appearance. She is ill-appearing.      Comments: Unable to perform PE due to virtual visit, Patient was visualized on video, conversation clear, alert and oriented X 3, and is in no acute distress.    Pulmonary:      Effort: Pulmonary effort is normal.   Neurological:      Mental Status: She is alert and oriented to person, place, and time.         Assessment/Plan   Diagnoses and all orders for this visit:  COVID-19 virus infection  Previously Prescribed Paxlovid, Use OTC Tylenol as needed for pain/fever and Mucinex or Robitussin as needed for cough/congestion  Follow CDC guidelines for quarantine and masking.   seek emergency care if having CP or difficulty breathing.  Follow up as needed

## 2023-10-23 ASSESSMENT — ENCOUNTER SYMPTOMS
CHILLS: 1
DIAPHORESIS: 1
HEADACHES: 1
FEVER: 1
SHORTNESS OF BREATH: 0
DIARRHEA: 0
MYALGIAS: 1
VOMITING: 0
COUGH: 1
NAUSEA: 0

## 2023-10-30 ENCOUNTER — TELEMEDICINE (OUTPATIENT)
Dept: PRIMARY CARE | Facility: CLINIC | Age: 83
End: 2023-10-30
Payer: MEDICARE

## 2023-10-30 DIAGNOSIS — J01.90 ACUTE NON-RECURRENT SINUSITIS, UNSPECIFIED LOCATION: ICD-10-CM

## 2023-10-30 DIAGNOSIS — U07.1 COVID: Primary | ICD-10-CM

## 2023-10-30 PROCEDURE — 99213 OFFICE O/P EST LOW 20 MIN: CPT | Performed by: FAMILY MEDICINE

## 2023-10-30 RX ORDER — BENZONATATE 200 MG/1
200 CAPSULE ORAL 3 TIMES DAILY PRN
Qty: 42 CAPSULE | Refills: 0 | Status: SHIPPED | OUTPATIENT
Start: 2023-10-30 | End: 2023-11-29

## 2023-10-30 NOTE — PROGRESS NOTES
VAXXED AND BOOSTED--never had COVID in past--    10/18/23--sxs started  COVID + started 12 days -sl cough not as bad as it is now--stuffy nose-congestion-+nausea  No taste and no smell--since 2 days of symptoms--still gone  Eating food--decreased appetite  Fluids-- drinking good    Paxlovid --on the 19th-- gave her upset stomach-- diarrhea-- and bad taste in mouth-- still has bad taste in mouth today- paxlovid did not help much  Took paxlovid 19-23rd---    Worsened again ~3 days ago (27th)--  Fever-- not as high-- 99.7-100.1---chills and aches  HA came back--on top of head  No N,V,D   No SOB tightness heaviness in chest or trouble breathing  Mild ST  Stuffy RN and congestion came back--  Sinuses--eyes watering quite a bit-- no sinus TTP  No teeth pain  Eyes feel heavy  +exhaustion--the whole time--    COVID test this am-- it was (-)+N  OTC meds--robitussin DM-- no help--   Able to sleep ok--  Tessalon pearles--took in past without issues and worked-no adverse effects or dizziness    Discussed covid rebound in detail (sxs started again 4 days after stopping paxlovid-- fever not as high although other symptoms recurred that had improved) as the likely cause of her symptoms vs pneumonia vs sinusitis--   Do not recommend antibiotics at this time-- risks (diarrhea/dehydration etc) > potential benefit at this point--may need to consider if sinus symptoms worsen and concern of secondary sinusitis  Fever is less than initial illness--tylenol as needed   No red flags--able to drink fluids staying hydrated  No CP or SOB  Low threshold for in person evaluation discussed  Benzonatate for cough--mucinex DM too--  30 min visit  All questions answered and pt agrees with plan

## 2023-10-30 NOTE — PATIENT INSTRUCTIONS
Please send me a [employer] message if you have any questions or concerns.  FOR NON URGENT questions only.  Allow up to 72 hours for response.    If you have prescription issues or other questions you can email   Jaci Mesa,  Digital Health Coordinator, at   [email or web address]    Discussed covid rebound in detail (sxs started again 4 days after stopping paxlovid-- fever not as high although other symptoms recurred that had improved) as the likely cause of her symptoms vs pneumonia vs sinusitis--   Do not recommend antibiotics at this time-- risks (diarrhea/dehydration etc) > potential benefit at this point--may need to consider if sinus symptoms worsen and concern of secondary sinusitis  Fever is less than initial illness--tylenol as needed   No red flags--able to drink fluids staying hydrated  No CP or SOB  Low threshold for in person evaluation discussed  Benzonatate for cough--mucinex DM too--  30 min visit  All questions answered and pt agrees with plan    Rest and drink plenty of fluids    Tylenol and or motrin as needed for pain and fever (unless you have been told not to take these because of your personal medical history)    Discussed options and precautions:   Viral versus bacterial infection; use of medications; possible side effects; appropriate over-the-counter medications; possible complications and /or when to follow-up.    Follow-up in 1 to 2 days if not improving.  Follow-up immediately if symptoms worsen.    All red flags requiring in person care were discussed.  All patient's questions were answered.    Limitations to telemedicine include inability to do a complete and accurate physical exam.  Any concerns regarding this were conveyed with the patient and in person follow-up recommended if patient nature of illness does not progress as anticipated during this visit.    Over-the-counter cold and cough medications    Take Mucinex for cough, drink plenty of fluids with this medication  and will help break up congestion making it easier to cough up    For cough can use honey (children ages 1 and up) in hot tea or hot water. I recommend putting this in an insulated cup and carrying it around throughout the day to sip on.  Have it at your bedside at night in case you wake up coughing.  You can also use honey cough drops (adults and older children).    Recommend nasal saline for use in children and adults.  Neti Gandhi can also be helpful.  (Never used tap water and a Neti pot.  Use distilled water.)    If you have plugged up congested ears or the feeling of fluid in your ears, you can use an over-the-counter nasal steroid spray like fluticasone (brand name Flonase) use 2 sprays into each nostril once or twice a day for 7 days.  This will help open up the eustachian tubes and allow the fluid to drain out of your ears.    Sleeping with your head/chest elevated can help with sinus drainage.    Adults only-can use nasal decongestant (like Afrin) at bedtime to open nasal passages so you can breathe through your nose while you sleep; avoid using for longer than 3 days as this medication can become addicting.  Do not use if you have high blood pressure or high heart rate.    For severe pain or fever in adult-Tylenol (2 extra strength) or ibuprofen (3-4 tabs equals 600 to 800 mg) alternating as needed for pain.  Tylenol doses should be 6 to 8 hours apart and ibuprofen doses should be 6 to 8 hours apart.        Common cold medications for adults explained:    **Cold medications for children are not recommended-only Tylenol, Motrin, honey (only for children older than 1 year), cool-mist humidifier, and nasal saline spray are recommended for children.    Mucinex-(generic name guaifenesin)-is an expectorant.  This will thin out all the thick mucus.  Must drink plenty of liquids for this medicine to work.    Dextromethorphan (brand name Delsym or DM)-this medicine is a cough suppressant    Honey in hot water or  tea-this is a natural cough suppressant    Decongestant nasal spray- (eg: Afrin) use for temporary relief of nasal congestion-best when used at bedtime to open up nasal passages so that you are not forced to mouth breathe overnight.    Sudafed (generic name pseudoephedrine-this must be bought from the pharmacist) DO NOT use this medicine if you have high blood pressure as it can raise your blood pressure higher.  Do not use if you have any irregular heart rate.  This medicine can help clear congestion in your sinuses.

## 2023-10-31 ENCOUNTER — HOSPITAL ENCOUNTER (OUTPATIENT)
Dept: RADIOLOGY | Facility: HOSPITAL | Age: 83
Discharge: HOME | End: 2023-10-31
Payer: MEDICARE

## 2023-10-31 ENCOUNTER — PATIENT MESSAGE (OUTPATIENT)
Dept: PRIMARY CARE | Facility: CLINIC | Age: 83
End: 2023-10-31
Payer: MEDICARE

## 2023-10-31 DIAGNOSIS — J01.90 ACUTE NON-RECURRENT SINUSITIS, UNSPECIFIED LOCATION: Primary | ICD-10-CM

## 2023-10-31 DIAGNOSIS — U07.1 COVID: ICD-10-CM

## 2023-10-31 PROCEDURE — 71046 X-RAY EXAM CHEST 2 VIEWS: CPT | Performed by: RADIOLOGY

## 2023-10-31 PROCEDURE — 71046 X-RAY EXAM CHEST 2 VIEWS: CPT

## 2023-11-01 RX ORDER — DOXYCYCLINE 100 MG/1
100 CAPSULE ORAL 2 TIMES DAILY
Qty: 14 CAPSULE | Refills: 0 | Status: SHIPPED | OUTPATIENT
Start: 2023-11-01 | End: 2023-11-02 | Stop reason: SINTOL

## 2023-11-02 RX ORDER — AZITHROMYCIN 250 MG/1
TABLET, FILM COATED ORAL
Qty: 6 TABLET | Refills: 0 | Status: SHIPPED | OUTPATIENT
Start: 2023-11-02 | End: 2023-11-07

## 2023-11-11 ENCOUNTER — TELEMEDICINE (OUTPATIENT)
Dept: PRIMARY CARE | Facility: CLINIC | Age: 83
End: 2023-11-11
Payer: MEDICARE

## 2023-11-11 DIAGNOSIS — J01.90 ACUTE NON-RECURRENT SINUSITIS, UNSPECIFIED LOCATION: Primary | ICD-10-CM

## 2023-11-11 DIAGNOSIS — R30.0 DYSURIA: ICD-10-CM

## 2023-11-11 PROCEDURE — 99213 OFFICE O/P EST LOW 20 MIN: CPT | Performed by: FAMILY MEDICINE

## 2023-11-11 RX ORDER — CEFDINIR 300 MG/1
300 CAPSULE ORAL 2 TIMES DAILY
Qty: 14 CAPSULE | Refills: 0 | Status: SHIPPED | OUTPATIENT
Start: 2023-11-11 | End: 2023-11-18

## 2023-11-11 NOTE — PATIENT INSTRUCTIONS
Please send me a [employer] message if you have any questions or concerns.  FOR NON URGENT questions only.  Allow up to 72 hours for response.    If you have prescription issues or other questions you can email   Jaci Mesa,  Digital Health Coordinator, at   [email or web address]    Sinusitis secondary to COVID despite z-gage and UTI--  Pt  states she is not allergic to amoxicillin-- not sure why that is in chart  Last time I treated sinus with doxy but she had N/V so switched to z-gage (trying to avoid augmentin because not sure of reaction)  Now with  classic UTI sxs and with symptoms of sinusitis despite z-gage, will treat with omnicef BID x 7 days-- (per [place] guide-- keflex not best for sinus and omnicef listed as pcn allergy alternatives for both UTI and sinusitis)  Discussed relation of cephalosporins with pcn-- no h/o anaphylaxis per pt-    Rest and drink plenty of fluids  Follow up If no improvement or if symptoms worsen in 48 hours OR if symptoms persist after completing the antibiotics OR if you develop fevers, severe back or abdominal pain or any other concerning symptoms.     Rest and drink plenty of fluids    Tylenol and or motrin as needed for pain and fever (unless you have been told not to take these because of your personal medical history)    Discussed options and precautions:   Viral versus bacterial infection; use of medications; possible side effects; appropriate over-the-counter medications; possible complications and /or when to follow-up.    Follow-up in 1 to 2 days if not improving.  Follow-up immediately if symptoms worsen.    All red flags requiring in person care were discussed.  All patient's questions were answered.    Limitations to telemedicine include inability to do a complete and accurate physical exam.  Any concerns regarding this were conveyed with the patient and in person follow-up recommended if patient nature of illness does not progress as anticipated during  this visit.

## 2023-11-11 NOTE — PROGRESS NOTES
10/18 COVID sxs started  Did antibiotics for secondary sinusitis-- z-gage (doxy made her sick)  Cough dry-- worsened yesterday--no SOB or wheeze or trouble breathing  Still with RN, stuffy nose and congestion--still had sinus congestion after z-pack    Has h/o UTI --  > 4 months since last UTI? Yes  Sxs have persisted for 4 days  Sxs are are worsening  Dysuria?Yes--urine feels hot--urine is darker in color--yellow--no blood in urine  Frequency? Yes  Urgency? Yes-- afraid to go because it burns-- no incontinence  Blood in urine? No  Abnormal vaginal bleeding? No  Abnormal vaginal pain? No  Abnormal vaginal discharge No  Flank pain? No  Abdominal pain?  No  H/o kidney stones?No  H/o kidney infection? No  OTC meds? no  Fever, chills, body aches?  99.7 and some chills--started a couple days ago  N,V,Diarrhea? No  All other ROS (-)  Drinking enough fluids-- yes      Physical Exam:  Alert in NAD  Affect normal  Eyes clear  Frequent fits of dry coughing  No resp distress or audible wheezing  CVA TTP  No  Abdominal TTP? No     Sinusitis secondary to COVID despite z-gage and UTI--  Pt  states she is not allergic to amoxicillin-- not sure why that is in chart  Last time I treated sinus with doxy but she had N/V so switched to z-gage (trying to avoid augmentin because not sure of reaction)  Now with  classic UTI sxs and with symptoms of sinusitis despite z-gage, will treat with omnicef BID x 7 days-- (per [place] guide-- keflex not best for sinus and omnicef listed as pcn allergy alternatives for both UTI and sinusitis)  Discussed relation of cephalosporins with pcn-- no h/o anaphylaxis per pt-    Rest and drink plenty of fluids  Follow up If no improvement or if symptoms worsen in 48 hours OR if symptoms persist after completing the antibiotics OR if you develop fevers, severe back or abdominal pain or any other concerning symptoms.

## 2023-11-17 ENCOUNTER — TELEPHONE (OUTPATIENT)
Dept: PRIMARY CARE | Facility: CLINIC | Age: 83
End: 2023-11-17
Payer: MEDICARE

## 2023-11-17 DIAGNOSIS — M48.061 SPINAL STENOSIS OF LUMBAR REGION, UNSPECIFIED WHETHER NEUROGENIC CLAUDICATION PRESENT: ICD-10-CM

## 2023-11-17 RX ORDER — OXYCODONE HYDROCHLORIDE 10 MG/1
10 TABLET ORAL 3 TIMES DAILY
Qty: 90 TABLET | Refills: 0 | Status: SHIPPED | OUTPATIENT
Start: 2023-11-17 | End: 2023-12-18 | Stop reason: SDUPTHER

## 2023-11-22 ENCOUNTER — TELEPHONE (OUTPATIENT)
Dept: PRIMARY CARE | Facility: CLINIC | Age: 83
End: 2023-11-22
Payer: MEDICARE

## 2023-11-22 DIAGNOSIS — I10 PRIMARY HYPERTENSION: ICD-10-CM

## 2023-11-22 RX ORDER — AMLODIPINE BESYLATE 10 MG/1
10 TABLET ORAL DAILY
Qty: 30 TABLET | Refills: 2 | Status: SHIPPED | OUTPATIENT
Start: 2023-11-22 | End: 2024-01-24 | Stop reason: SDUPTHER

## 2023-11-22 RX ORDER — CARVEDILOL 12.5 MG/1
12.5 TABLET ORAL
Qty: 60 TABLET | Refills: 1 | Status: SHIPPED | OUTPATIENT
Start: 2023-11-22 | End: 2024-01-24 | Stop reason: SDUPTHER

## 2023-11-22 NOTE — TELEPHONE ENCOUNTER
Needing refills of amlodipine (30 day supply at a time) and carvedilol (60 day supply at at time) sent to  pharmacy.

## 2023-12-07 ENCOUNTER — APPOINTMENT (OUTPATIENT)
Dept: CARDIOLOGY | Facility: CLINIC | Age: 83
End: 2023-12-07
Payer: MEDICARE

## 2023-12-12 ENCOUNTER — OFFICE VISIT (OUTPATIENT)
Dept: PRIMARY CARE | Facility: CLINIC | Age: 83
End: 2023-12-12
Payer: MEDICARE

## 2023-12-12 DIAGNOSIS — B37.2 YEAST DERMATITIS: Primary | ICD-10-CM

## 2023-12-12 PROCEDURE — 1159F MED LIST DOCD IN RCRD: CPT | Performed by: NURSE PRACTITIONER

## 2023-12-12 PROCEDURE — 99213 OFFICE O/P EST LOW 20 MIN: CPT | Performed by: NURSE PRACTITIONER

## 2023-12-12 PROCEDURE — 1160F RVW MEDS BY RX/DR IN RCRD: CPT | Performed by: NURSE PRACTITIONER

## 2023-12-12 PROCEDURE — 1036F TOBACCO NON-USER: CPT | Performed by: NURSE PRACTITIONER

## 2023-12-12 RX ORDER — NYSTATIN 100000 U/G
CREAM TOPICAL 2 TIMES DAILY
Qty: 30 G | Refills: 0 | Status: SHIPPED | OUTPATIENT
Start: 2023-12-12 | End: 2023-12-19

## 2023-12-12 RX ORDER — FLUCONAZOLE 150 MG/1
150 TABLET ORAL ONCE
Qty: 1 TABLET | Refills: 1 | Status: SHIPPED | OUTPATIENT
Start: 2023-12-12 | End: 2023-12-12

## 2023-12-12 ASSESSMENT — ENCOUNTER SYMPTOMS
RESPIRATORY NEGATIVE: 1
CARDIOVASCULAR NEGATIVE: 1
GASTROINTESTINAL NEGATIVE: 1
CONSTITUTIONAL NEGATIVE: 1
ROS SKIN COMMENTS: YEAST
PSYCHIATRIC NEGATIVE: 1

## 2023-12-12 NOTE — PROGRESS NOTES
Subjective   Patient ID: Tierney Madrid is a 83 y.o. female who presents for Vaginitis/Bacterial Vaginosis.  Has had yeast infection since had COVID around 10/19/23  Has taken an antibiotic x 2  in November- zpak and Cefdinir  Outer perineal area affected  Skin burns with urination.  Has tried OTC nystatin cream and cortisone cream        Review of Systems   Constitutional: Negative.    HENT: Negative.     Respiratory: Negative.     Cardiovascular: Negative.    Gastrointestinal: Negative.    Skin:         Yeast   Psychiatric/Behavioral: Negative.         Objective   Physical Exam  Constitutional:       Appearance: Normal appearance.   HENT:      Head: Normocephalic.   Eyes:      Conjunctiva/sclera: Conjunctivae normal.   Musculoskeletal:      Cervical back: Neck supple.   Skin:     General: Skin is warm and dry.      Comments: Mild erythema bilateral labia, outer and inner, with mild erosion 5-6 mm inner left labia.   Neurological:      Mental Status: She is alert.   Psychiatric:         Mood and Affect: Mood normal.         Assessment/Plan   Diagnoses and all orders for this visit:  Yeast dermatitis  -     nystatin (Mycostatin) cream; Apply topically 2 times a day for 7 days. apply to affected area  -     fluconazole (Diflucan) 150 mg tablet; Take 1 tablet (150 mg) by mouth 1 time for 1 dose.     Has had yeast for about 2 months  Likely continuing due to antibiotic use.  Will start nystatin cream and Diflucan  Call next week if not significantly improved and follow up as needed/as scheduled.    SANAZ Ferrell-CNP 12/12/23 1:06 PM

## 2023-12-13 NOTE — PROGRESS NOTES
Cardiology Subsequent Encounter Clinic Note  Name: Tierney Madrid  MRN: [id number]  : 1940  CC: ***    Active Issues:  Tierney Madrid is a 83 y.o. female  with a medical history of coronary artery disease (stents to the RCA 2016, mid LAD 2017, RCA 2018 in 2019), supraventricular tachyarrhythmia, hypertension, hyperlipidemia here for evaluation of the following complaints:     Coronary artery disease as described above  -Current medical therapy includes aspirin 81 mg, atorvastatin 20 mg, Coreg 12.5 mg twice daily  -Due to worsening dyspnea with exertion the patient underwent a cardiac catheterization 2022; underwent IVUS guided PCI of mid/distal LAD. Has been compliant with her aspirin/Plavix.      Since her PCI the patient reports an improvement in her chest discomfort and shortness of breath. Symptoms have almost resolved. Denies any orthopnea/PND/lower extremity edema. Denies any dizziness or lightheadedness.            Past Medical History  Past Medical History:   Diagnosis Date    Acute myocardial infarction, unspecified (CMS/HCC)     Acute myocardial infarction    Tinea unguium 2022    Onychomycosis of toenail       Past Surgical History  Past Surgical History:   Procedure Laterality Date    OTHER SURGICAL HISTORY  11/15/2019    Hip replacement    OTHER SURGICAL HISTORY  11/15/2019    Lumbar laminectomy    OTHER SURGICAL HISTORY  11/15/2019    Colonoscopy    OTHER SURGICAL HISTORY  2022    Cardiac catheterization with stent placement       Medications  Current Outpatient Medications on File Prior to Visit   Medication Sig Dispense Refill    amLODIPine (Norvasc) 10 mg tablet Take 1 tablet (10 mg) by mouth once daily. 30 tablet 2    aspirin 81 mg EC tablet Take 1 tablet (81 mg) by mouth once daily.      carvedilol (Coreg) 12.5 mg tablet Take 1 tablet (12.5 mg) by mouth 2 times a day with meals. other 60 tablet 1    clopidogrel (Plavix) 75 mg tablet Take 1 tablet  (75 mg) by mouth once daily. 30 tablet 11    [] fluconazole (Diflucan) 150 mg tablet Take 1 tablet (150 mg) by mouth 1 time for 1 dose. 1 tablet 1    fluticasone (Flonase) 50 mcg/actuation nasal spray Administer 1 spray into affected nostril(s) twice a day. Instill 1 squirt      furosemide (Lasix) 20 mg tablet Take 1 tablet (20 mg) by mouth once daily. 30 tablet 11    isosorbide mononitrate ER (Imdur) 60 mg 24 hr tablet Take 1 tablet (60 mg) by mouth once daily. 30 tablet 11    lisinopril 10 mg tablet Take 2 tablets (20 mg) by mouth once daily.      loratadine (Claritin) 10 mg tablet Take 1 tablet (10 mg) by mouth once daily as needed for allergies. Allergy tabs      naloxone (Narcan) 4 mg/0.1 mL nasal spray Administer 1 spray (4 mg) into affected nostril(s) if needed (unresponsiveness).      nitroglycerin (Nitrostat) 0.4 mg SL tablet Place 1 tablet (0.4 mg) under the tongue every 5 minutes if needed for chest pain. For up to 3 doses as needed for chest pain. Call 911 if pain persists      nystatin (Mycostatin) cream Apply topically 2 times a day for 7 days. apply to affected area 30 g 0    oxyCODONE (Roxicodone) 10 mg immediate release tablet Take 1 tablet (10 mg) by mouth 3 times a day. 90 tablet 0    pantoprazole (ProtoNix) 40 mg EC tablet Take 1 tablet (40 mg) by mouth once daily.      pramipexole (Mirapex) 0.5 mg tablet Take 1 tablet (0.5 mg) by mouth in the morning and 1 tablet (0.5 mg) before bedtime. 60 tablet 11     No current facility-administered medications on file prior to visit.       Allergies  Allergies   Allergen Reactions    Amoxicillin Unknown    Diazepam Unknown    Gabapentin Unknown    Promethazine Unknown    Zolpidem Unknown    Doxycycline Nausea/vomiting       Social History  Social History     Tobacco Use    Smoking status: Never    Smokeless tobacco: Never   Vaping Use    Vaping Use: Never used   Substance Use Topics    Alcohol use: Not Currently    Drug use: Never       Family  History  Family History   Problem Relation Name Age of Onset    Heart disease Mother      Hypertension Mother      Lung cancer Mother      Clotting disorder Father      Heart disease Father      Stroke Father      Heart failure Father      Hypertension Father         Physical Examination  Vitals: There were no vitals taken for this visit.  General: awake, alert and oriented. No acute distress.   Skin: Skin is warm, dry and intact without rashes or lesions. Appropriate color for ethnicity. Nail beds pink with no cyanosis or clubbing  HEENT: normocephalic, atraumatic; conjunctivae are clear without exudates or hemorrhage. Sclera is non-icteric. Eyelids are normal in appearance without swelling or lesions. Hearing intact. Nares are patent bilaterally. Moist mucous membranes.   Cardiovascular: Regular. No murmurs, gallops, or rubs are auscultated. S1 and S2 are heard and are of normal intensity. No JVD, no carotid bruits  Respiratory: Thorax symmetric. CTAB, breath sounds vesicular. No crackles, wheezes or ronchi.   Gastrointestinal: soft, non-distended, BS + x 4  Genitourinary: exam deferred  Musculoskeletal: moves all extremities  Extremities: pulses palpable bilaterally; no swelling or erythema; no edema  Neurological: alert & oriented x 3; no focal deficits  Psychiatric: appropriate mood and affect       Labs/Imaging/Procedures    Lab Results   Component Value Date    HGB 11.8 (L) 08/30/2023    HGB 9.3 (L) 05/29/2023    HGB 9.7 (L) 11/07/2022     08/30/2023    WBC 6.0 08/30/2023     08/30/2023    K 3.2 (L) 08/30/2023    CREATININE 0.74 08/30/2023    CREATININE 1.37 (H) 05/29/2023    CREATININE 1.08 (H) 11/07/2022    BUN 10 08/30/2023    CALCIUM 9.2 08/30/2023    INR 1.0 11/09/2022    TROPHS 5 08/30/2023    TROPHS 5 08/30/2023    TROPHS 4 05/29/2023     No echocardiogram results found for the past 12 months  XR chest 2 views  Narrative: Interpreted By:  Buddy Anders,   STUDY:  XR CHEST 2 VIEWS       INDICATION:  Signs/Symptoms:covid + since 10/18/23--.      COMPARISON:  August 30, 2023      ACCESSION NUMBER(S):  [id number]      ORDERING CLINICIAN:  SULEMAN LEUNG      FINDINGS:  No consolidation, effusion, edema, or pneumothorax. The pleural  thickening on the right grossly unchanged. Atherosclerotic aorta and  previous cardiac stenting again noted.      Impression: No evidence of acute intrathoracic abnormality. Chronic changes  similar to prior study.      Signed by: Buddy Anders 11/1/2023 6:58 PM  Dictation workstation:   [id number]      Impression  Tierney Madrid is a 83 y.o. female with a medical history of coronary artery disease (stents to the RCA 12/2016, mid LAD 11/2017, RCA October 2018 in September 2019), supraventricular tachyarrhythmia, hypertension, hyperlipidemia here for evaluation of the following complaints:     Coronary artery disease as described above  -Current medical therapy includes aspirin 81 mg, atorvastatin 20 mg, Coreg 12.5 mg twice daily  -Due to worsening dyspnea with exertion the patient underwent a cardiac catheterization November 9, 2022; underwent IVUS guided PCI of mid/distal LAD. Has been compliant with her aspirin/Plavix.      Plan:  -For uncontrolled hypertension will increase lisinopril to 20 mg. BMP in 1 week  -RTC 6 months          Eugene White MD  Advanced Heart Failure/Transplant Cardiology  Cardio-Oncology  [facility]

## 2023-12-14 ENCOUNTER — APPOINTMENT (OUTPATIENT)
Dept: CARDIOLOGY | Facility: CLINIC | Age: 83
End: 2023-12-14
Payer: MEDICARE

## 2023-12-18 DIAGNOSIS — M48.061 SPINAL STENOSIS OF LUMBAR REGION, UNSPECIFIED WHETHER NEUROGENIC CLAUDICATION PRESENT: ICD-10-CM

## 2023-12-18 RX ORDER — OXYCODONE HYDROCHLORIDE 10 MG/1
10 TABLET ORAL 3 TIMES DAILY
Qty: 90 TABLET | Refills: 0 | Status: SHIPPED | OUTPATIENT
Start: 2023-12-18 | End: 2024-01-17 | Stop reason: SDUPTHER

## 2023-12-18 NOTE — TELEPHONE ENCOUNTER
Medication Refill Request    Medication:  oxycodone    Pharmacy:  [facility]    Last OV:  09/26/23  Upcoming appointment:  01/24/24    ORDER PENDED

## 2024-01-09 ENCOUNTER — APPOINTMENT (OUTPATIENT)
Dept: PRIMARY CARE | Facility: CLINIC | Age: 84
End: 2024-01-09
Payer: MEDICARE

## 2024-01-17 ENCOUNTER — TELEPHONE (OUTPATIENT)
Dept: PRIMARY CARE | Facility: CLINIC | Age: 84
End: 2024-01-17
Payer: MEDICARE

## 2024-01-17 DIAGNOSIS — M48.061 SPINAL STENOSIS OF LUMBAR REGION, UNSPECIFIED WHETHER NEUROGENIC CLAUDICATION PRESENT: ICD-10-CM

## 2024-01-17 NOTE — TELEPHONE ENCOUNTER
Stating she has a yeast infection again. Delma prescribed 1 pill and nystatin and it helped. Having burning when she urinates. Asking for a call back.     Also needing a refill of oxycodone sent to  pharmacy.

## 2024-01-18 ENCOUNTER — TELEPHONE (OUTPATIENT)
Dept: PRIMARY CARE | Facility: CLINIC | Age: 84
End: 2024-01-18
Payer: MEDICARE

## 2024-01-18 DIAGNOSIS — B37.2 YEAST DERMATITIS: Primary | ICD-10-CM

## 2024-01-18 DIAGNOSIS — M48.061 SPINAL STENOSIS OF LUMBAR REGION, UNSPECIFIED WHETHER NEUROGENIC CLAUDICATION PRESENT: ICD-10-CM

## 2024-01-18 RX ORDER — OXYCODONE HYDROCHLORIDE 10 MG/1
10 TABLET ORAL 3 TIMES DAILY
Qty: 90 TABLET | Refills: 0 | Status: SHIPPED | OUTPATIENT
Start: 2024-01-18 | End: 2024-02-19 | Stop reason: SDUPTHER

## 2024-01-18 RX ORDER — FLUCONAZOLE 150 MG/1
150 TABLET ORAL ONCE
Qty: 1 TABLET | Refills: 0 | Status: SHIPPED | OUTPATIENT
Start: 2024-01-18 | End: 2024-01-18

## 2024-01-18 RX ORDER — OXYCODONE HYDROCHLORIDE 10 MG/1
10 TABLET ORAL 3 TIMES DAILY
Qty: 90 TABLET | Refills: 0 | Status: CANCELLED | OUTPATIENT
Start: 2024-01-18 | End: 2024-02-17

## 2024-01-18 RX ORDER — NYSTATIN 100000 U/G
CREAM TOPICAL 2 TIMES DAILY
Qty: 30 G | Refills: 0 | Status: SHIPPED | OUTPATIENT
Start: 2024-01-18 | End: 2024-01-25

## 2024-01-18 NOTE — TELEPHONE ENCOUNTER
Rx has already been sent 1/18/24   Patient requests all Lab, Cardiology, and Radiology Results on their Discharge Instructions

## 2024-01-24 ENCOUNTER — OFFICE VISIT (OUTPATIENT)
Dept: PRIMARY CARE | Facility: CLINIC | Age: 84
End: 2024-01-24
Payer: MEDICARE

## 2024-01-24 VITALS
TEMPERATURE: 97.4 F | SYSTOLIC BLOOD PRESSURE: 150 MMHG | BODY MASS INDEX: 21.7 KG/M2 | WEIGHT: 128.4 LBS | DIASTOLIC BLOOD PRESSURE: 78 MMHG | OXYGEN SATURATION: 98 % | HEART RATE: 88 BPM

## 2024-01-24 DIAGNOSIS — M48.061 SPINAL STENOSIS OF LUMBAR REGION, UNSPECIFIED WHETHER NEUROGENIC CLAUDICATION PRESENT: Primary | ICD-10-CM

## 2024-01-24 DIAGNOSIS — I10 PRIMARY HYPERTENSION: ICD-10-CM

## 2024-01-24 DIAGNOSIS — K21.9 GASTROESOPHAGEAL REFLUX DISEASE, UNSPECIFIED WHETHER ESOPHAGITIS PRESENT: ICD-10-CM

## 2024-01-24 PROCEDURE — 3078F DIAST BP <80 MM HG: CPT | Performed by: FAMILY MEDICINE

## 2024-01-24 PROCEDURE — 1160F RVW MEDS BY RX/DR IN RCRD: CPT | Performed by: FAMILY MEDICINE

## 2024-01-24 PROCEDURE — 3077F SYST BP >= 140 MM HG: CPT | Performed by: FAMILY MEDICINE

## 2024-01-24 PROCEDURE — 1036F TOBACCO NON-USER: CPT | Performed by: FAMILY MEDICINE

## 2024-01-24 PROCEDURE — 99214 OFFICE O/P EST MOD 30 MIN: CPT | Performed by: FAMILY MEDICINE

## 2024-01-24 PROCEDURE — 1159F MED LIST DOCD IN RCRD: CPT | Performed by: FAMILY MEDICINE

## 2024-01-24 RX ORDER — LISINOPRIL 40 MG/1
40 TABLET ORAL DAILY
Qty: 30 TABLET | Refills: 11 | Status: SHIPPED | OUTPATIENT
Start: 2024-01-24 | End: 2024-05-23 | Stop reason: SDUPTHER

## 2024-01-24 RX ORDER — AMLODIPINE BESYLATE 10 MG/1
10 TABLET ORAL DAILY
Qty: 30 TABLET | Refills: 11 | Status: SHIPPED | OUTPATIENT
Start: 2024-01-24

## 2024-01-24 RX ORDER — PANTOPRAZOLE SODIUM 40 MG/1
40 TABLET, DELAYED RELEASE ORAL DAILY
Qty: 30 TABLET | Refills: 11 | Status: SHIPPED | OUTPATIENT
Start: 2024-01-24

## 2024-01-24 RX ORDER — CARVEDILOL 12.5 MG/1
12.5 TABLET ORAL
Qty: 30 TABLET | Refills: 11 | Status: SHIPPED | OUTPATIENT
Start: 2024-01-24

## 2024-01-24 ASSESSMENT — ENCOUNTER SYMPTOMS: BACK PAIN: 1

## 2024-01-24 NOTE — PROGRESS NOTES
Hypertension:  She is not exercising and is not eating a low salt diet.  Blood pressure is being monitored at home. Cardiac symptoms dyspnea, fatigue, and lower extremity edema. Patient denies chest pain, chest pressure/discomfort, irregular heart beat, and palpitations.      GERD:  At times has heartburn or acid reflux, denies epigastric pain    Back Pain:  .Symptoms are worst: morning. Alleviating factors identifiable by patient are medication oxycodone . Exacerbating factors identifiable by patient are bending backwards, bending forwards, bending sideways, sitting, standing, walking, and walking uphill.  Rates her pain today at 9 out of 10.

## 2024-01-24 NOTE — PROGRESS NOTES
Subjective   Patient ID: Tierney Madrid is a 83 y.o. female who presents for Hypertension, GERD, and Back Pain.    Hypertension:  She is not exercising and is not eating a low salt diet.  Blood pressure is being monitored at home. Cardiac symptoms dyspnea, fatigue, and lower extremity edema. Patient denies chest pain, chest pressure/discomfort, irregular heart beat, and palpitations.       GERD:  At times has heartburn or acid reflux, denies epigastric pain     Back Pain:  .Symptoms are worst: morning. Alleviating factors identifiable by patient are medication oxycodone . Exacerbating factors identifiable by patient are bending backwards, bending forwards, bending sideways, sitting, standing, walking, and walking uphill.  Rates her pain today at 8 out of 10.     OARRS:  Sami Leggett MD on 1/24/2024  I have personally reviewed the OARRS report for Tierney Madrid. I have considered the risks of abuse, dependence, addiction and diversion     Is the patient prescribed a combination of a benzodiazepine and opioid?  No     Last Urine Drug Screen / ordered today: no   Results are as expected.   June 26, 2023     Controlled Substance Agreement:  Date of the Last Agreement: Feb 29, 2023  Reviewed Controlled Substance Agreement including but not limited to the benefits, risks, and alternatives to treatment with a Controlled Substance medication(s).     Opioids:  What is the patient's goal of therapy? To relieve pain and increase function  Is this being achieved with current treatment? yes     I have calculated the patient's Morphine Dose Equivalent (MED):   I have considered referral to Pain Management and/or a specialist, and do not feel it is necessary at this time.     I feel that it is clinically indicated to continue this current medication regimen after consideration of alternative therapies, and other non-opioid treatment.     Opioid Risk Screening: 3/39/23     Pain Assessment:  Analgesia  What was your pain level on  average during the past week?: 8  What was your pain level at its worst during the past week?: 10 - Pain as bad as it can be  What percentage of your pain has been relieved during the past week?: 80 %  Is the amount of pain relief you are now obtaining from your current pain relievers enough to make a real difference in your life?: Y  Query to Clinician: Is the patient's pain relief clinically significant?: Yes     Activities of Daily Living  Physical Functioning: better  Family Relationships: Same  Social Relationships: Same  Mood: better  Sleep Patterns: better  Overall Functioning: better     Adverse Events  Is patient experiencing any side effects from current pain relievers?: N  Patient's Overall Severity of Side Effects: None        Assessment  Is your overall impression that this patient is benefiting from opioid therapy?: Yes  Specific Analgesic Plan: Continue present regimen    Htn not at goal will increase lisniopril to 40 mg     Gerd occ symptoms on protonix     Edema seems worse but losing weight    Not wearing reddy hose     CAD will see cardiology in May     No cp     Review of Systems   Musculoskeletal:  Positive for back pain.       Objective   /78 (BP Location: Left arm, Patient Position: Sitting)   Pulse 88   Temp 36.3 °C (97.4 °F)   Wt 58.2 kg (128 lb 6.4 oz)   SpO2 98%   BMI 21.70 kg/m²     Physical Exam  Vitals reviewed.   Constitutional:       Appearance: Normal appearance.   HENT:      Head: Normocephalic and atraumatic.   Eyes:      Conjunctiva/sclera: Conjunctivae normal.   Cardiovascular:      Rate and Rhythm: Normal rate and regular rhythm.   Pulmonary:      Effort: Pulmonary effort is normal.      Breath sounds: Normal breath sounds.   Musculoskeletal:      Cervical back: Neck supple.   Skin:     General: Skin is warm and dry.   Neurological:      General: No focal deficit present.      Mental Status: She is alert and oriented to person, place, and time.   Psychiatric:          Mood and Affect: Mood normal.         Behavior: Behavior normal.         Thought Content: Thought content normal.         Judgment: Judgment normal.         Assessment/Plan   Diagnoses and all orders for this visit:  Spinal stenosis of lumbar region, unspecified whether neurogenic claudication present  Primary hypertension  -     amLODIPine (Norvasc) 10 mg tablet; Take 1 tablet (10 mg) by mouth once daily.  -     carvedilol (Coreg) 12.5 mg tablet; Take 1 tablet (12.5 mg) by mouth 2 times a day with meals. other  -     lisinopril 40 mg tablet; Take 1 tablet (40 mg) by mouth once daily.  Gastroesophageal reflux disease, unspecified whether esophagitis present  -     pantoprazole (ProtoNix) 40 mg EC tablet; Take 1 tablet (40 mg) by mouth once daily.

## 2024-02-05 ENCOUNTER — OFFICE VISIT (OUTPATIENT)
Dept: PRIMARY CARE | Facility: CLINIC | Age: 84
End: 2024-02-05
Payer: MEDICARE

## 2024-02-05 ENCOUNTER — HOSPITAL ENCOUNTER (OUTPATIENT)
Dept: RADIOLOGY | Facility: CLINIC | Age: 84
Discharge: HOME | End: 2024-02-05
Payer: MEDICARE

## 2024-02-05 VITALS
SYSTOLIC BLOOD PRESSURE: 150 MMHG | WEIGHT: 127.3 LBS | OXYGEN SATURATION: 99 % | BODY MASS INDEX: 21.51 KG/M2 | DIASTOLIC BLOOD PRESSURE: 78 MMHG | TEMPERATURE: 97.1 F | HEART RATE: 70 BPM

## 2024-02-05 DIAGNOSIS — R05.1 ACUTE COUGH: ICD-10-CM

## 2024-02-05 DIAGNOSIS — R05.1 ACUTE COUGH: Primary | ICD-10-CM

## 2024-02-05 DIAGNOSIS — H04.203 WATERY EYES: ICD-10-CM

## 2024-02-05 DIAGNOSIS — F32.4 MAJOR DEPRESSIVE DISORDER WITH SINGLE EPISODE, IN PARTIAL REMISSION (CMS-HCC): ICD-10-CM

## 2024-02-05 PROCEDURE — 71046 X-RAY EXAM CHEST 2 VIEWS: CPT

## 2024-02-05 PROCEDURE — 3077F SYST BP >= 140 MM HG: CPT | Performed by: FAMILY MEDICINE

## 2024-02-05 PROCEDURE — 3078F DIAST BP <80 MM HG: CPT | Performed by: FAMILY MEDICINE

## 2024-02-05 PROCEDURE — 1160F RVW MEDS BY RX/DR IN RCRD: CPT | Performed by: FAMILY MEDICINE

## 2024-02-05 PROCEDURE — 1036F TOBACCO NON-USER: CPT | Performed by: FAMILY MEDICINE

## 2024-02-05 PROCEDURE — 1159F MED LIST DOCD IN RCRD: CPT | Performed by: FAMILY MEDICINE

## 2024-02-05 PROCEDURE — 71046 X-RAY EXAM CHEST 2 VIEWS: CPT | Performed by: RADIOLOGY

## 2024-02-05 PROCEDURE — 99214 OFFICE O/P EST MOD 30 MIN: CPT | Performed by: FAMILY MEDICINE

## 2024-02-05 RX ORDER — SERTRALINE HYDROCHLORIDE 25 MG/1
25 TABLET, FILM COATED ORAL DAILY
Qty: 30 TABLET | Refills: 2 | Status: SHIPPED | OUTPATIENT
Start: 2024-02-05 | End: 2024-05-20 | Stop reason: SDUPTHER

## 2024-02-05 RX ORDER — AZELASTINE HYDROCHLORIDE 0.5 MG/ML
1 SOLUTION/ DROPS OPHTHALMIC 2 TIMES DAILY
Qty: 6 ML | Refills: 0 | Status: SHIPPED | OUTPATIENT
Start: 2024-02-05 | End: 2025-02-04

## 2024-02-05 NOTE — PROGRESS NOTES
Subjective   Patient ID: Tierney Madrid is a 83 y.o. female who presents for URI.    URI           URI:  Tierney Madrid is a 83 y.o. female who complains of congestion, sneezing, nasal blockage, dry cough, myalgias, headache, chills, low grade fevers, and watery eyes  for 7 days. She denies a history of   denies a history of asthma. Patient denies smoke cigarettes.  She is using her nasal spray, Zyrtec with no improvement.  She has not tested for Covid.          10 day   Low grade temp   Both eyes watering   Allergies zyrtec   Coughing dry   Sob   Stuffy nose runny nose no color     Derpessed over health concerns with teeth vision and chronic pain     Crying in the office      Needs teeth pulled     Cannot change her prisms     Review of Systems    Objective   /78 (BP Location: Right arm, Patient Position: Sitting)   Pulse 70   Temp 36.2 °C (97.1 °F)   Wt 57.7 kg (127 lb 4.8 oz)   SpO2 99%   BMI 21.51 kg/m²     Physical Exam  Vitals reviewed.   Constitutional:       Appearance: Normal appearance.   HENT:      Head: Normocephalic and atraumatic.      Nose: Nose normal. No congestion.      Mouth/Throat:      Mouth: Mucous membranes are moist.      Pharynx: Oropharynx is clear. No oropharyngeal exudate or posterior oropharyngeal erythema.   Eyes:      Extraocular Movements: Extraocular movements intact.      Conjunctiva/sclera: Conjunctivae normal.      Pupils: Pupils are equal, round, and reactive to light.      Comments: Vahe eye watery with min bulbar conj redness    Cardiovascular:      Rate and Rhythm: Normal rate and regular rhythm.   Pulmonary:      Effort: Pulmonary effort is normal.      Breath sounds: Normal breath sounds.   Musculoskeletal:      Cervical back: Neck supple.   Skin:     General: Skin is warm and dry.   Neurological:      General: No focal deficit present.      Mental Status: She is alert and oriented to person, place, and time.   Psychiatric:         Mood and Affect: Mood normal.          Behavior: Behavior normal.         Thought Content: Thought content normal.         Judgment: Judgment normal.         Assessment/Plan   Diagnoses and all orders for this visit:  Acute cough  -     XR chest 2 views; Future  Watery eyes  -     azelastine (Optivar) 0.05 % ophthalmic solution; Administer 1 drop into both eyes 2 times a day.  Major depressive disorder with single episode, in partial remission (CMS/HCC)  -     sertraline (Zoloft) 25 mg tablet; Take 1 tablet (25 mg) by mouth once daily.

## 2024-02-05 NOTE — PROGRESS NOTES
URI:  Tierney Madrid is a 83 y.o. female who complains of congestion, sneezing, nasal blockage, dry cough, myalgias, headache, chills, low grade fevers, and watery eyes  for 7 days. She denies a history of   denies a history of asthma. Patient denies smoke cigarettes.  She is using her nasal spray, Zyrtec with no improvement.  She has not tested for Covid.

## 2024-02-13 ENCOUNTER — HOSPITAL ENCOUNTER (EMERGENCY)
Facility: HOSPITAL | Age: 84
Discharge: HOME | End: 2024-02-13
Attending: EMERGENCY MEDICINE
Payer: MEDICARE

## 2024-02-13 ENCOUNTER — APPOINTMENT (OUTPATIENT)
Dept: RADIOLOGY | Facility: HOSPITAL | Age: 84
End: 2024-02-13
Payer: MEDICARE

## 2024-02-13 ENCOUNTER — APPOINTMENT (OUTPATIENT)
Dept: CARDIOLOGY | Facility: HOSPITAL | Age: 84
End: 2024-02-13
Payer: MEDICARE

## 2024-02-13 VITALS
RESPIRATION RATE: 18 BRPM | DIASTOLIC BLOOD PRESSURE: 76 MMHG | BODY MASS INDEX: 24.55 KG/M2 | SYSTOLIC BLOOD PRESSURE: 161 MMHG | TEMPERATURE: 98.6 F | OXYGEN SATURATION: 98 % | HEIGHT: 61 IN | WEIGHT: 130 LBS | HEART RATE: 76 BPM

## 2024-02-13 DIAGNOSIS — R07.9 CHEST PAIN, UNSPECIFIED TYPE: Primary | ICD-10-CM

## 2024-02-13 DIAGNOSIS — I10 HYPERTENSION, UNSPECIFIED TYPE: ICD-10-CM

## 2024-02-13 LAB
ALBUMIN SERPL BCP-MCNC: 4.1 G/DL (ref 3.4–5)
ALP SERPL-CCNC: 88 U/L (ref 33–136)
ALT SERPL W P-5'-P-CCNC: 10 U/L (ref 7–45)
ANION GAP SERPL CALC-SCNC: 14 MMOL/L (ref 10–20)
AST SERPL W P-5'-P-CCNC: 19 U/L (ref 9–39)
ATRIAL RATE: 74 BPM
ATRIAL RATE: 99 BPM
BASOPHILS # BLD AUTO: 0.02 X10*3/UL (ref 0–0.1)
BASOPHILS NFR BLD AUTO: 0.3 %
BILIRUB SERPL-MCNC: 0.3 MG/DL (ref 0–1.2)
BUN SERPL-MCNC: 20 MG/DL (ref 6–23)
CALCIUM SERPL-MCNC: 9.2 MG/DL (ref 8.6–10.3)
CARDIAC TROPONIN I PNL SERPL HS: 4 NG/L (ref 0–13)
CARDIAC TROPONIN I PNL SERPL HS: 4 NG/L (ref 0–13)
CHLORIDE SERPL-SCNC: 105 MMOL/L (ref 98–107)
CO2 SERPL-SCNC: 23 MMOL/L (ref 21–32)
CREAT SERPL-MCNC: 0.77 MG/DL (ref 0.5–1.05)
D DIMER PPP FEU-MCNC: 712 NG/ML FEU
EGFRCR SERPLBLD CKD-EPI 2021: 77 ML/MIN/1.73M*2
EOSINOPHIL # BLD AUTO: 0.13 X10*3/UL (ref 0–0.4)
EOSINOPHIL NFR BLD AUTO: 2.1 %
ERYTHROCYTE [DISTWIDTH] IN BLOOD BY AUTOMATED COUNT: 12.6 % (ref 11.5–14.5)
GLUCOSE SERPL-MCNC: 107 MG/DL (ref 74–99)
HCT VFR BLD AUTO: 35.6 % (ref 36–46)
HGB BLD-MCNC: 11.4 G/DL (ref 12–16)
IMM GRANULOCYTES # BLD AUTO: 0.01 X10*3/UL (ref 0–0.5)
IMM GRANULOCYTES NFR BLD AUTO: 0.2 % (ref 0–0.9)
LYMPHOCYTES # BLD AUTO: 2.5 X10*3/UL (ref 0.8–3)
LYMPHOCYTES NFR BLD AUTO: 40.1 %
MAGNESIUM SERPL-MCNC: 2.09 MG/DL (ref 1.6–2.4)
MCH RBC QN AUTO: 30.2 PG (ref 26–34)
MCHC RBC AUTO-ENTMCNC: 32 G/DL (ref 32–36)
MCV RBC AUTO: 94 FL (ref 80–100)
MONOCYTES # BLD AUTO: 0.36 X10*3/UL (ref 0.05–0.8)
MONOCYTES NFR BLD AUTO: 5.8 %
NEUTROPHILS # BLD AUTO: 3.21 X10*3/UL (ref 1.6–5.5)
NEUTROPHILS NFR BLD AUTO: 51.5 %
NRBC BLD-RTO: 0 /100 WBCS (ref 0–0)
P AXIS: 44 DEGREES
P AXIS: 56 DEGREES
P OFFSET: 194 MS
P OFFSET: 208 MS
P ONSET: 146 MS
P ONSET: 158 MS
PLATELET # BLD AUTO: 287 X10*3/UL (ref 150–450)
POTASSIUM SERPL-SCNC: 4.2 MMOL/L (ref 3.5–5.3)
PR INTERVAL: 138 MS
PR INTERVAL: 162 MS
PROT SERPL-MCNC: 7.4 G/DL (ref 6.4–8.2)
Q ONSET: 227 MS
Q ONSET: 227 MS
QRS COUNT: 12 BEATS
QRS COUNT: 16 BEATS
QRS DURATION: 76 MS
QRS DURATION: 80 MS
QT INTERVAL: 346 MS
QT INTERVAL: 384 MS
QTC CALCULATION(BAZETT): 426 MS
QTC CALCULATION(BAZETT): 444 MS
QTC FREDERICIA: 408 MS
QTC FREDERICIA: 412 MS
R AXIS: -17 DEGREES
R AXIS: -5 DEGREES
RBC # BLD AUTO: 3.77 X10*6/UL (ref 4–5.2)
SODIUM SERPL-SCNC: 138 MMOL/L (ref 136–145)
T AXIS: -4 DEGREES
T AXIS: 5 DEGREES
T OFFSET: 400 MS
T OFFSET: 419 MS
VENTRICULAR RATE: 74 BPM
VENTRICULAR RATE: 99 BPM
WBC # BLD AUTO: 6.2 X10*3/UL (ref 4.4–11.3)

## 2024-02-13 PROCEDURE — 71275 CT ANGIOGRAPHY CHEST: CPT

## 2024-02-13 PROCEDURE — 83735 ASSAY OF MAGNESIUM: CPT | Performed by: EMERGENCY MEDICINE

## 2024-02-13 PROCEDURE — 71045 X-RAY EXAM CHEST 1 VIEW: CPT | Mod: FOREIGN READ | Performed by: RADIOLOGY

## 2024-02-13 PROCEDURE — 84075 ASSAY ALKALINE PHOSPHATASE: CPT | Performed by: EMERGENCY MEDICINE

## 2024-02-13 PROCEDURE — 84484 ASSAY OF TROPONIN QUANT: CPT | Performed by: EMERGENCY MEDICINE

## 2024-02-13 PROCEDURE — 36415 COLL VENOUS BLD VENIPUNCTURE: CPT | Performed by: EMERGENCY MEDICINE

## 2024-02-13 PROCEDURE — 99285 EMERGENCY DEPT VISIT HI MDM: CPT | Mod: 25

## 2024-02-13 PROCEDURE — 93005 ELECTROCARDIOGRAM TRACING: CPT

## 2024-02-13 PROCEDURE — 2500000004 HC RX 250 GENERAL PHARMACY W/ HCPCS (ALT 636 FOR OP/ED): Performed by: EMERGENCY MEDICINE

## 2024-02-13 PROCEDURE — 71275 CT ANGIOGRAPHY CHEST: CPT | Mod: FOREIGN READ | Performed by: RADIOLOGY

## 2024-02-13 PROCEDURE — 2550000001 HC RX 255 CONTRASTS: Performed by: EMERGENCY MEDICINE

## 2024-02-13 PROCEDURE — 99284 EMERGENCY DEPT VISIT MOD MDM: CPT | Mod: 25 | Performed by: EMERGENCY MEDICINE

## 2024-02-13 PROCEDURE — 71045 X-RAY EXAM CHEST 1 VIEW: CPT

## 2024-02-13 PROCEDURE — 85379 FIBRIN DEGRADATION QUANT: CPT | Performed by: EMERGENCY MEDICINE

## 2024-02-13 PROCEDURE — 96374 THER/PROPH/DIAG INJ IV PUSH: CPT | Performed by: EMERGENCY MEDICINE

## 2024-02-13 PROCEDURE — 96376 TX/PRO/DX INJ SAME DRUG ADON: CPT | Performed by: EMERGENCY MEDICINE

## 2024-02-13 PROCEDURE — 85025 COMPLETE CBC W/AUTO DIFF WBC: CPT | Performed by: EMERGENCY MEDICINE

## 2024-02-13 RX ORDER — LABETALOL HYDROCHLORIDE 5 MG/ML
20 INJECTION, SOLUTION INTRAVENOUS ONCE
Status: COMPLETED | OUTPATIENT
Start: 2024-02-13 | End: 2024-02-13

## 2024-02-13 RX ORDER — ACETAMINOPHEN 500 MG
1000 TABLET ORAL EVERY 8 HOURS PRN
COMMUNITY

## 2024-02-13 RX ORDER — CETIRIZINE HYDROCHLORIDE 5 MG/1
5 TABLET ORAL DAILY
COMMUNITY

## 2024-02-13 RX ADMIN — LABETALOL HYDROCHLORIDE 20 MG: 5 INJECTION, SOLUTION INTRAVENOUS at 09:27

## 2024-02-13 RX ADMIN — IOHEXOL 72 ML: 350 INJECTION, SOLUTION INTRAVENOUS at 12:58

## 2024-02-13 RX ADMIN — LABETALOL HYDROCHLORIDE 20 MG: 5 INJECTION, SOLUTION INTRAVENOUS at 11:02

## 2024-02-13 ASSESSMENT — HEART SCORE
ECG: NORMAL
HISTORY: SLIGHTLY SUSPICIOUS
HEART SCORE: 3
TROPONIN: LESS THAN OR EQUAL TO NORMAL LIMIT
AGE: 65+
RISK FACTORS: 1-2 RISK FACTORS

## 2024-02-13 ASSESSMENT — COLUMBIA-SUICIDE SEVERITY RATING SCALE - C-SSRS
1. IN THE PAST MONTH, HAVE YOU WISHED YOU WERE DEAD OR WISHED YOU COULD GO TO SLEEP AND NOT WAKE UP?: NO
2. HAVE YOU ACTUALLY HAD ANY THOUGHTS OF KILLING YOURSELF?: NO
6. HAVE YOU EVER DONE ANYTHING, STARTED TO DO ANYTHING, OR PREPARED TO DO ANYTHING TO END YOUR LIFE?: NO

## 2024-02-13 ASSESSMENT — PAIN DESCRIPTION - LOCATION: LOCATION: CHEST

## 2024-02-13 ASSESSMENT — PAIN SCALES - GENERAL
PAINLEVEL_OUTOF10: 6
PAINLEVEL_OUTOF10: 5 - MODERATE PAIN

## 2024-02-13 ASSESSMENT — PAIN - FUNCTIONAL ASSESSMENT: PAIN_FUNCTIONAL_ASSESSMENT: 0-10

## 2024-02-13 NOTE — ED PROVIDER NOTES
Chest pain.  This 83-year-old white female states that she woke up at 5 AM this morning with chest pain symptoms she states that she normally takes sublingual nitro with resolution of her symptoms but after taking her first sublingual nitro her symptoms actually got worse she then took 2 more sublingual nitro with some improvement of her symptoms she states that she also has associated shortness of breath.  She believes her last cardiac stent was placed a ago.  She believes it was was at [facility] where they did a heart cath and stent placement.  Patient states that nothing seems to aggravate or make her symptoms better or worse.      History provided by:  Patient   used: No         Physical Exam  Vitals and nursing note reviewed.   Constitutional:       General: She is awake.      Appearance: Normal appearance. She is overweight.   HENT:      Head: Normocephalic and atraumatic.      Jaw: There is normal jaw occlusion.      Right Ear: Decreased hearing noted.      Left Ear: Decreased hearing noted.      Nose: Nose normal. No congestion or rhinorrhea.      Mouth/Throat:      Lips: Pink.      Mouth: Mucous membranes are moist.      Pharynx: Oropharynx is clear. No oropharyngeal exudate or posterior oropharyngeal erythema.   Eyes:      General: Lids are normal.         Right eye: No discharge.         Left eye: No discharge.      Extraocular Movements: Extraocular movements intact.      Conjunctiva/sclera: Conjunctivae normal.      Pupils: Pupils are equal, round, and reactive to light.   Cardiovascular:      Rate and Rhythm: Normal rate and regular rhythm.      Pulses: Normal pulses.      Heart sounds: Normal heart sounds. No murmur heard.     No friction rub. No gallop.   Pulmonary:      Effort: Pulmonary effort is normal. No respiratory distress.      Breath sounds: Normal breath sounds. No stridor. No wheezing, rhonchi or rales.   Chest:      Chest wall: No tenderness.    Abdominal:      General: Abdomen is flat. Bowel sounds are normal. There is no distension.      Palpations: Abdomen is soft. There is no mass.      Tenderness: There is no abdominal tenderness. There is no guarding or rebound.      Hernia: No hernia is present.   Musculoskeletal:         General: No swelling, tenderness, deformity or signs of injury. Normal range of motion.      Cervical back: Full passive range of motion without pain, normal range of motion and neck supple.      Right lower leg: Normal. No edema.      Left lower leg: Normal. No edema.   Skin:     General: Skin is warm and dry.      Capillary Refill: Capillary refill takes less than 2 seconds.      Coloration: Skin is not jaundiced or pale.      Findings: No bruising, erythema, lesion or rash.   Neurological:      General: No focal deficit present.      Mental Status: She is alert and oriented to person, place, and time.      GCS: GCS eye subscore is 4. GCS verbal subscore is 5. GCS motor subscore is 6.      Cranial Nerves: Cranial nerves 2-12 are intact. No cranial nerve deficit.      Sensory: Sensation is intact. No sensory deficit.      Motor: Motor function is intact. No weakness.      Coordination: Coordination normal.      Deep Tendon Reflexes: Reflexes normal.   Psychiatric:         Attention and Perception: Attention and perception normal.         Mood and Affect: Mood normal.         Speech: Speech normal.         Behavior: Behavior normal. Behavior is cooperative.         Thought Content: Thought content normal.         Judgment: Judgment normal.          Labs Reviewed   D-DIMER, VTE EXCLUSION - Abnormal       Result Value    D-Dimer, Quantitative VTE Exclusion 712 (*)     Narrative:     The VTE Exclusion D-Dimer assay is reported in ng/mL Fibrinogen Equivalent Units (FEU).    Per 's instructions for use, a value of less than 500 ng/mL (FEU) may help to exclude DVT or PE in outpatients when the assay is used with a clinical  pretest probability assessment.([facility] must utilize and document eCalc 'Wells Score Deep Vein Thrombosis Risk' for DVT exclusion only. Emergency Department should utilize  Guidelines for Emergency Department Use of the VTE Exclusion D-Dimer and Clinical Pretest probability assessment model for DVT or PE exclusion.)   CBC WITH AUTO DIFFERENTIAL - Abnormal    WBC 6.2      nRBC 0.0      RBC 3.77 (*)     Hemoglobin 11.4 (*)     Hematocrit 35.6 (*)     MCV 94      MCH 30.2      MCHC 32.0      RDW 12.6      Platelets 287      Neutrophils % 51.5      Immature Granulocytes %, Automated 0.2      Lymphocytes % 40.1      Monocytes % 5.8      Eosinophils % 2.1      Basophils % 0.3      Neutrophils Absolute 3.21      Immature Granulocytes Absolute, Automated 0.01      Lymphocytes Absolute 2.50      Monocytes Absolute 0.36      Eosinophils Absolute 0.13      Basophils Absolute 0.02     COMPREHENSIVE METABOLIC PANEL - Abnormal    Glucose 107 (*)     Sodium 138      Potassium 4.2      Chloride 105      Bicarbonate 23      Anion Gap 14      Urea Nitrogen 20      Creatinine 0.77      eGFR 77      Calcium 9.2      Albumin 4.1      Alkaline Phosphatase 88      Total Protein 7.4      AST 19      Bilirubin, Total 0.3      ALT 10     MAGNESIUM - Normal    Magnesium 2.09     SERIAL TROPONIN-INITIAL - Normal    Troponin I, High Sensitivity 4      Narrative:     Less than 99th percentile of normal range cutoff-  Female and children under 18 years old <14 ng/L; Male <21 ng/L: Negative  Repeat testing should be performed if clinically indicated.     Female and children under 18 years old 14-50 ng/L; Male 21-50 ng/L:  Consistent with possible cardiac damage and possible increased clinical   risk. Serial measurements may help to assess extent of myocardial damage.     >50 ng/L: Consistent with cardiac damage, increased clinical risk and  myocardial infarction. Serial measurements may help assess extent of   myocardial damage.      NOTE: Children  less than 1 year old may have higher baseline troponin   levels and results should be interpreted in conjunction with the overall   clinical context.     NOTE: Troponin I testing is performed using a different   testing methodology at [facility] than at other   [facility]. Direct result comparisons should only   be made within the same method.   SERIAL TROPONIN, 1 HOUR - Normal    Troponin I, High Sensitivity 4      Narrative:     Less than 99th percentile of normal range cutoff-  Female and children under 18 years old <14 ng/L; Male <21 ng/L: Negative  Repeat testing should be performed if clinically indicated.     Female and children under 18 years old 14-50 ng/L; Male 21-50 ng/L:  Consistent with possible cardiac damage and possible increased clinical   risk. Serial measurements may help to assess extent of myocardial damage.     >50 ng/L: Consistent with cardiac damage, increased clinical risk and  myocardial infarction. Serial measurements may help assess extent of   myocardial damage.      NOTE: Children less than 1 year old may have higher baseline troponin   levels and results should be interpreted in conjunction with the overall   clinical context.     NOTE: Troponin I testing is performed using a different   testing methodology at [facility] than at other   [facility]. Direct result comparisons should only   be made within the same method.   TROPONIN SERIES- (INITIAL, 1 HR)    Narrative:     The following orders were created for panel order Troponin I Series, High Sensitivity (0, 1 HR).  Procedure                               Abnormality         Status                     ---------                               -----------         ------                     Troponin I, High Sensiti...[[id number]]  Normal              Final result               Troponin, High Sensitivi...[[id number]]  Normal              Final result                 Please view results for these tests  on the individual orders.        CT angio chest for pulmonary embolism   Final Result   No evidence of pulmonary emboli.   Mucous plugging of the bronchi with bibasilar lower lobe infiltrates.   Signed by Ravi Mederos MD      XR chest 1 view   Final Result   No regions of airspace consolidation.   Stable cardiac size.   Coronary artery stents.   Signed by Jose Mares MD           Procedures     Medical Decision Making  Patient is very poor historian. Patient was seen and evaluated in the emergency permit due to complaints chest pain.  Patient EKG performed that revealed no acute ischemic changes and was ordered blood work including a troponin and D-dimer.  The patient's D-dimer was noted to be elevated 112.  CBC revealed a white cell count normal at 6.2.  Hemoglobin was 11.4.  CMP was normal magnesium was normal at 2.09.  Initial troponin was 4 repeat was 4.  Patient because of the elevated D-dimer and complaints of chest pain and CT of the chest was ordered this revealed no evidence of pulmonary emboli with mucous plugging of the bronchi with bibasilar lower lobe infiltrates.  Chest x-ray revealed no regions of airspace consolidation stable cardiac size with coronary artery stents.  Was noted to be hypertensive during the course of the ED visit and tolerated with 2 doses of labetalol with improvement of her blood pressure to normal range.  At discharge I did have a discussion with the patient concerning her lab work CT and x-ray results and also need for follow-up with Dr. White concerning her blood pressure control she states that she has been running high at home for several weeks now.  I told her to call the office tomorrow to get an appointment within the next week.    Amount and/or Complexity of Data Reviewed  ECG/medicine tests: independent interpretation performed.     Details: EKG was interpreted by myself at 8:57 AM reveals sinus rhythm 99 bpm with Q waves in the anterior septal precordial leads V1  through V3.  No other acute ST or T wave changes noted.  The MN interval is 138 ms.  The QRS durations 80 ms.  The QTc is 440 ms.  Axis is -5 degrees.  EKG was interpreted by myself at 10:09 AM reveals normal sinus rhythm with evidence of LVH and Q waves present in leads V1 through V3.  No other acute ST or T wave changes noted.  Heart rate is 74 bpm.  The MN interval is 162 ms.  The QRS duration is 76 ms.  The QTc is 426 ms.  Axis is -17 degrees.         Diagnoses as of 02/13/24 1424   Chest pain, unspecified type   Hypertension, unspecified type                    Wesly Cerna,   02/14/24 1157

## 2024-02-15 ENCOUNTER — TELEPHONE (OUTPATIENT)
Dept: PRIMARY CARE | Facility: CLINIC | Age: 84
End: 2024-02-15
Payer: MEDICARE

## 2024-02-15 DIAGNOSIS — R05.1 ACUTE COUGH: Primary | ICD-10-CM

## 2024-02-15 RX ORDER — CEPHALEXIN 500 MG/1
500 CAPSULE ORAL 3 TIMES DAILY
Qty: 21 CAPSULE | Refills: 0 | Status: SHIPPED | OUTPATIENT
Start: 2024-02-15 | End: 2024-02-22

## 2024-02-15 NOTE — TELEPHONE ENCOUNTER
"Was in the ER for chest pain Monday. She is coughing up \"greenish/brownish mucous,\" asking for an ATB to be sent in.   "

## 2024-02-16 ENCOUNTER — APPOINTMENT (OUTPATIENT)
Dept: CARDIOLOGY | Facility: CLINIC | Age: 84
End: 2024-02-16
Payer: MEDICARE

## 2024-02-19 DIAGNOSIS — M48.061 SPINAL STENOSIS OF LUMBAR REGION, UNSPECIFIED WHETHER NEUROGENIC CLAUDICATION PRESENT: ICD-10-CM

## 2024-02-19 RX ORDER — OXYCODONE HYDROCHLORIDE 10 MG/1
10 TABLET ORAL 3 TIMES DAILY
Qty: 90 TABLET | Refills: 0 | Status: SHIPPED | OUTPATIENT
Start: 2024-02-19 | End: 2024-03-19 | Stop reason: SDUPTHER

## 2024-02-19 NOTE — TELEPHONE ENCOUNTER
Medication Refill Request    Medication:  oxycodone    Pharmacy:  [facility]    Last OV:  1/24/24  Upcoming appointment:  5/6/24    ORDER PENDED

## 2024-02-29 ENCOUNTER — TELEPHONE (OUTPATIENT)
Dept: CARDIOLOGY | Facility: CLINIC | Age: 84
End: 2024-02-29
Payer: MEDICARE

## 2024-02-29 NOTE — TELEPHONE ENCOUNTER
Pt calling stating she is having oral surgery with removing teeth and a removal of a bone. Pt would like to know how long she should hold blood thinner prior to surgery?

## 2024-03-08 DIAGNOSIS — G25.81 RESTLESS LEG SYNDROME: ICD-10-CM

## 2024-03-08 RX ORDER — PRAMIPEXOLE DIHYDROCHLORIDE 0.5 MG/1
0.5 TABLET ORAL 2 TIMES DAILY
Qty: 60 TABLET | Refills: 11 | Status: SHIPPED | OUTPATIENT
Start: 2024-03-08 | End: 2025-03-08

## 2024-03-08 NOTE — TELEPHONE ENCOUNTER
Medication Refill Request    Medication:  pramipexole    Pharmacy:  [facility]    Last OV:  9/26/23  Upcoming appointment:  not scheduled    ORDER PENDED

## 2024-03-19 DIAGNOSIS — M48.061 SPINAL STENOSIS OF LUMBAR REGION, UNSPECIFIED WHETHER NEUROGENIC CLAUDICATION PRESENT: ICD-10-CM

## 2024-03-19 RX ORDER — OXYCODONE HYDROCHLORIDE 10 MG/1
10 TABLET ORAL 3 TIMES DAILY
Qty: 90 TABLET | Refills: 0 | Status: SHIPPED | OUTPATIENT
Start: 2024-03-19 | End: 2024-04-18 | Stop reason: SDUPTHER

## 2024-03-19 NOTE — TELEPHONE ENCOUNTER
Requesting a refill for her Oxycodone 10mg  1 tid    [facility] pharmacy

## 2024-04-05 ENCOUNTER — TELEPHONE (OUTPATIENT)
Dept: PRIMARY CARE | Facility: CLINIC | Age: 84
End: 2024-04-05
Payer: MEDICARE

## 2024-04-05 NOTE — TELEPHONE ENCOUNTER
Had some teeth taken out yesterday. Before that she had a cough and now has a fever of 100.4. Coughing up green mucus. Asking for a RX to be sent to  pharmacy. Asking for a call back.

## 2024-04-05 NOTE — TELEPHONE ENCOUNTER
Patient had some teeth taken out yesterday. Prior she had a cough and now has a fever of 100.4 and coughing up green mucus.  She is asking for ATB be sent to [facility]    Please advise.

## 2024-04-18 DIAGNOSIS — M48.061 SPINAL STENOSIS OF LUMBAR REGION, UNSPECIFIED WHETHER NEUROGENIC CLAUDICATION PRESENT: ICD-10-CM

## 2024-04-18 NOTE — TELEPHONE ENCOUNTER
Patient called in requesting a refill on her oxycodone called into [facility] pharmacy in [place].    Thank you

## 2024-04-19 ENCOUNTER — TELEPHONE (OUTPATIENT)
Dept: PRIMARY CARE | Facility: CLINIC | Age: 84
End: 2024-04-19

## 2024-04-19 RX ORDER — OXYCODONE HYDROCHLORIDE 10 MG/1
10 TABLET ORAL 3 TIMES DAILY
Qty: 90 TABLET | Refills: 0 | Status: SHIPPED | OUTPATIENT
Start: 2024-04-19 | End: 2024-05-17 | Stop reason: SDUPTHER

## 2024-05-06 ENCOUNTER — OFFICE VISIT (OUTPATIENT)
Dept: PRIMARY CARE | Facility: CLINIC | Age: 84
End: 2024-05-06
Payer: MEDICARE

## 2024-05-06 VITALS
DIASTOLIC BLOOD PRESSURE: 60 MMHG | HEART RATE: 76 BPM | WEIGHT: 129.9 LBS | OXYGEN SATURATION: 99 % | BODY MASS INDEX: 24.54 KG/M2 | SYSTOLIC BLOOD PRESSURE: 138 MMHG | TEMPERATURE: 97.7 F

## 2024-05-06 DIAGNOSIS — Z00.00 ROUTINE GENERAL MEDICAL EXAMINATION AT HEALTH CARE FACILITY: Primary | ICD-10-CM

## 2024-05-06 DIAGNOSIS — M48.061 SPINAL STENOSIS OF LUMBAR REGION, UNSPECIFIED WHETHER NEUROGENIC CLAUDICATION PRESENT: ICD-10-CM

## 2024-05-06 DIAGNOSIS — I25.118 CORONARY ARTERY DISEASE OF NATIVE ARTERY OF NATIVE HEART WITH STABLE ANGINA PECTORIS (CMS-HCC): ICD-10-CM

## 2024-05-06 PROCEDURE — 1124F ACP DISCUSS-NO DSCNMKR DOCD: CPT | Performed by: FAMILY MEDICINE

## 2024-05-06 PROCEDURE — 1170F FXNL STATUS ASSESSED: CPT | Performed by: FAMILY MEDICINE

## 2024-05-06 PROCEDURE — 99213 OFFICE O/P EST LOW 20 MIN: CPT | Performed by: FAMILY MEDICINE

## 2024-05-06 PROCEDURE — 1160F RVW MEDS BY RX/DR IN RCRD: CPT | Performed by: FAMILY MEDICINE

## 2024-05-06 PROCEDURE — 1159F MED LIST DOCD IN RCRD: CPT | Performed by: FAMILY MEDICINE

## 2024-05-06 PROCEDURE — G0439 PPPS, SUBSEQ VISIT: HCPCS | Performed by: FAMILY MEDICINE

## 2024-05-06 PROCEDURE — 3078F DIAST BP <80 MM HG: CPT | Performed by: FAMILY MEDICINE

## 2024-05-06 PROCEDURE — 3075F SYST BP GE 130 - 139MM HG: CPT | Performed by: FAMILY MEDICINE

## 2024-05-06 PROCEDURE — 1036F TOBACCO NON-USER: CPT | Performed by: FAMILY MEDICINE

## 2024-05-06 RX ORDER — NITROGLYCERIN 0.4 MG/1
0.4 TABLET SUBLINGUAL EVERY 5 MIN PRN
Qty: 25 TABLET | Refills: 0 | Status: SHIPPED | OUTPATIENT
Start: 2024-05-06 | End: 2025-05-06

## 2024-05-06 ASSESSMENT — ACTIVITIES OF DAILY LIVING (ADL)
TAKING_MEDICATION: INDEPENDENT
BATHING: INDEPENDENT
DRESSING: INDEPENDENT
DOING_HOUSEWORK: INDEPENDENT
MANAGING_FINANCES: INDEPENDENT
GROCERY_SHOPPING: INDEPENDENT

## 2024-05-06 ASSESSMENT — ENCOUNTER SYMPTOMS
LOSS OF SENSATION IN FEET: 0
DEPRESSION: 0
OCCASIONAL FEELINGS OF UNSTEADINESS: 0

## 2024-05-06 NOTE — PROGRESS NOTES
Subjective   Reason for Visit: Tierney Madrid is an 84 y.o. female here for a Medicare Wellness visit.     Past Medical, Surgical, and Family History reviewed and updated in chart.    Reviewed all medications by prescribing practitioner or clinical pharmacist (such as prescriptions, OTCs, herbal therapies and supplements) and documented in the medical record.    HPI    HPOA no   LIVING WILL no   CODE full   Dexa scan declines today   No changes on SBE   No blood in stool no bowel changes      OARRS:  Sami Leggett MD on 5/6/2024  I have personally reviewed the OARRS report for Tierney Madrid. I have considered the risks of abuse, dependence, addiction and diversion     Is the patient prescribed a combination of a benzodiazepine and opioid?  No     Last Urine Drug Screen / ordered today: no   Results are as expected.   June 26, 2023     Controlled Substance Agreement:  Date of the Last Agreement: reviewed May 6, 2024   Reviewed Controlled Substance Agreement including but not limited to the benefits, risks, and alternatives to treatment with a Controlled Substance medication(s).     Opioids:  What is the patient's goal of therapy? To relieve pain and increase function  Is this being achieved with current treatment? yes     I have calculated the patient's Morphine Dose Equivalent (MED):   I have considered referral to Pain Management and/or a specialist, and do not feel it is necessary at this time.     I feel that it is clinically indicated to continue this current medication regimen after consideration of alternative therapies, and other non-opioid treatment.     Opioid Risk Screening: 3/39/23     Pain Assessment:  Analgesia  What was your pain level on average during the past week?:  7  What was your pain level at its worst during the past week?: 10 - Pain as bad as it can be  What percentage of your pain has been relieved during the past week?: 80 %  Is the amount of pain relief you are now obtaining from your  current pain relievers enough to make a real difference in your life?: Y  Query to Clinician: Is the patient's pain relief clinically significant?: Yes     Activities of Daily Living  Physical Functioning: better  Family Relationships: Same  Social Relationships: Same  Mood: better  Sleep Patterns: better  Overall Functioning: better     Adverse Events  Is patient experiencing any side effects from current pain relievers?: N  Patient's Overall Severity of Side Effects: None        Assessment  Is your overall impression that this patient is benefiting from opioid therapy?: Yes  Specific Analgesic Plan: Continue present regimen     Htn controlled by medications     Gerd occ symptoms on protonix      Edema about the abe    Not wearing reddy hose but usually wears during the day      CAD will see cardiology in May     Last nitroglycerin 3  month ago and went ED and neg work up      Patient Care Team:  Sami Leggett MD as PCP - General  Sami Leggett MD as PCP - [employer] PCP     Review of Systems    Objective   Vitals:  /60 (BP Location: Left arm, Patient Position: Sitting)   Pulse 76   Temp 36.5 °C (97.7 °F)   Wt 58.9 kg (129 lb 14.4 oz)   SpO2 99%   BMI 24.54 kg/m²       Physical Exam  Vitals reviewed.   Constitutional:       Appearance: Normal appearance.   HENT:      Head: Normocephalic and atraumatic.   Eyes:      Conjunctiva/sclera: Conjunctivae normal.   Cardiovascular:      Rate and Rhythm: Normal rate and regular rhythm.   Pulmonary:      Effort: Pulmonary effort is normal.      Breath sounds: Normal breath sounds.   Musculoskeletal:      Cervical back: Neck supple.      Right lower leg: Edema (2+ pitting [place]) present.      Left lower leg: Edema present.   Skin:     General: Skin is warm and dry.   Neurological:      General: No focal deficit present.      Mental Status: She is alert and oriented to person, place, and time.      Comments: Walks with cane.    Psychiatric:          Mood and Affect: Mood normal.         Behavior: Behavior normal.         Thought Content: Thought content normal.         Judgment: Judgment normal.         Assessment/Plan   Problem List Items Addressed This Visit       CAD (coronary artery disease)    Relevant Medications    nitroglycerin (Nitrostat) 0.4 mg SL tablet     Other Visit Diagnoses       Routine general medical examination at health care facility    -  Primary    Spinal stenosis of lumbar region, unspecified whether neurogenic claudication present

## 2024-05-17 ENCOUNTER — TELEPHONE (OUTPATIENT)
Dept: PRIMARY CARE | Facility: CLINIC | Age: 84
End: 2024-05-17
Payer: MEDICARE

## 2024-05-17 DIAGNOSIS — M48.061 SPINAL STENOSIS OF LUMBAR REGION, UNSPECIFIED WHETHER NEUROGENIC CLAUDICATION PRESENT: ICD-10-CM

## 2024-05-17 RX ORDER — OXYCODONE HYDROCHLORIDE 10 MG/1
10 TABLET ORAL 3 TIMES DAILY
Qty: 90 TABLET | Refills: 0 | Status: SHIPPED | OUTPATIENT
Start: 2024-05-17 | End: 2024-06-16

## 2024-05-19 ENCOUNTER — TELEPHONE (OUTPATIENT)
Dept: PRIMARY CARE | Facility: CLINIC | Age: 84
End: 2024-05-19
Payer: MEDICARE

## 2024-05-19 DIAGNOSIS — R60.0 BILATERAL EDEMA OF LOWER EXTREMITY: ICD-10-CM

## 2024-05-19 DIAGNOSIS — F32.4 MAJOR DEPRESSIVE DISORDER WITH SINGLE EPISODE, IN PARTIAL REMISSION (CMS-HCC): ICD-10-CM

## 2024-05-20 ENCOUNTER — APPOINTMENT (OUTPATIENT)
Dept: CARDIOLOGY | Facility: HOSPITAL | Age: 84
End: 2024-05-20
Payer: MEDICARE

## 2024-05-20 ENCOUNTER — HOSPITAL ENCOUNTER (EMERGENCY)
Facility: HOSPITAL | Age: 84
Discharge: HOME | End: 2024-05-20
Attending: EMERGENCY MEDICINE
Payer: MEDICARE

## 2024-05-20 ENCOUNTER — APPOINTMENT (OUTPATIENT)
Dept: RADIOLOGY | Facility: HOSPITAL | Age: 84
End: 2024-05-20
Payer: MEDICARE

## 2024-05-20 VITALS
TEMPERATURE: 98.5 F | SYSTOLIC BLOOD PRESSURE: 180 MMHG | DIASTOLIC BLOOD PRESSURE: 83 MMHG | BODY MASS INDEX: 22.2 KG/M2 | HEART RATE: 78 BPM | WEIGHT: 130 LBS | RESPIRATION RATE: 18 BRPM | HEIGHT: 64 IN | OXYGEN SATURATION: 98 %

## 2024-05-20 DIAGNOSIS — I10 HYPERTENSION, UNSPECIFIED TYPE: ICD-10-CM

## 2024-05-20 DIAGNOSIS — J40 BRONCHITIS: Primary | ICD-10-CM

## 2024-05-20 LAB
ALBUMIN SERPL BCP-MCNC: 4.1 G/DL (ref 3.4–5)
ALP SERPL-CCNC: 101 U/L (ref 33–136)
ALT SERPL W P-5'-P-CCNC: 8 U/L (ref 7–45)
ANION GAP SERPL CALC-SCNC: 12 MMOL/L (ref 10–20)
AST SERPL W P-5'-P-CCNC: 14 U/L (ref 9–39)
BASOPHILS # BLD AUTO: 0.02 X10*3/UL (ref 0–0.1)
BASOPHILS NFR BLD AUTO: 0.3 %
BILIRUB SERPL-MCNC: 0.4 MG/DL (ref 0–1.2)
BNP SERPL-MCNC: 37 PG/ML (ref 0–99)
BUN SERPL-MCNC: 12 MG/DL (ref 6–23)
CALCIUM SERPL-MCNC: 9.2 MG/DL (ref 8.6–10.3)
CARDIAC TROPONIN I PNL SERPL HS: 4 NG/L (ref 0–13)
CHLORIDE SERPL-SCNC: 105 MMOL/L (ref 98–107)
CO2 SERPL-SCNC: 24 MMOL/L (ref 21–32)
CREAT SERPL-MCNC: 0.68 MG/DL (ref 0.5–1.05)
EGFRCR SERPLBLD CKD-EPI 2021: 87 ML/MIN/1.73M*2
EOSINOPHIL # BLD AUTO: 0.48 X10*3/UL (ref 0–0.4)
EOSINOPHIL NFR BLD AUTO: 6.4 %
ERYTHROCYTE [DISTWIDTH] IN BLOOD BY AUTOMATED COUNT: 12.4 % (ref 11.5–14.5)
FLUAV RNA RESP QL NAA+PROBE: NOT DETECTED
FLUBV RNA RESP QL NAA+PROBE: NOT DETECTED
GLUCOSE SERPL-MCNC: 98 MG/DL (ref 74–99)
HCT VFR BLD AUTO: 35.8 % (ref 36–46)
HGB BLD-MCNC: 11.3 G/DL (ref 12–16)
IMM GRANULOCYTES # BLD AUTO: 0.02 X10*3/UL (ref 0–0.5)
IMM GRANULOCYTES NFR BLD AUTO: 0.3 % (ref 0–0.9)
LYMPHOCYTES # BLD AUTO: 2.82 X10*3/UL (ref 0.8–3)
LYMPHOCYTES NFR BLD AUTO: 37.9 %
MCH RBC QN AUTO: 29.7 PG (ref 26–34)
MCHC RBC AUTO-ENTMCNC: 31.6 G/DL (ref 32–36)
MCV RBC AUTO: 94 FL (ref 80–100)
MONOCYTES # BLD AUTO: 0.37 X10*3/UL (ref 0.05–0.8)
MONOCYTES NFR BLD AUTO: 5 %
NEUTROPHILS # BLD AUTO: 3.74 X10*3/UL (ref 1.6–5.5)
NEUTROPHILS NFR BLD AUTO: 50.1 %
NRBC BLD-RTO: 0 /100 WBCS (ref 0–0)
PLATELET # BLD AUTO: 283 X10*3/UL (ref 150–450)
POTASSIUM SERPL-SCNC: 3.6 MMOL/L (ref 3.5–5.3)
PROT SERPL-MCNC: 7.8 G/DL (ref 6.4–8.2)
RBC # BLD AUTO: 3.8 X10*6/UL (ref 4–5.2)
SARS-COV-2 RNA RESP QL NAA+PROBE: NOT DETECTED
SODIUM SERPL-SCNC: 137 MMOL/L (ref 136–145)
WBC # BLD AUTO: 7.5 X10*3/UL (ref 4.4–11.3)

## 2024-05-20 PROCEDURE — 2500000001 HC RX 250 WO HCPCS SELF ADMINISTERED DRUGS (ALT 637 FOR MEDICARE OP): Performed by: EMERGENCY MEDICINE

## 2024-05-20 PROCEDURE — 71045 X-RAY EXAM CHEST 1 VIEW: CPT

## 2024-05-20 PROCEDURE — 87635 SARS-COV-2 COVID-19 AMP PRB: CPT | Performed by: EMERGENCY MEDICINE

## 2024-05-20 PROCEDURE — 99283 EMERGENCY DEPT VISIT LOW MDM: CPT | Mod: 25

## 2024-05-20 PROCEDURE — 93005 ELECTROCARDIOGRAM TRACING: CPT

## 2024-05-20 PROCEDURE — 80053 COMPREHEN METABOLIC PANEL: CPT | Performed by: EMERGENCY MEDICINE

## 2024-05-20 PROCEDURE — 83880 ASSAY OF NATRIURETIC PEPTIDE: CPT | Performed by: EMERGENCY MEDICINE

## 2024-05-20 PROCEDURE — 85025 COMPLETE CBC W/AUTO DIFF WBC: CPT | Performed by: EMERGENCY MEDICINE

## 2024-05-20 PROCEDURE — 36415 COLL VENOUS BLD VENIPUNCTURE: CPT | Performed by: EMERGENCY MEDICINE

## 2024-05-20 PROCEDURE — 84484 ASSAY OF TROPONIN QUANT: CPT | Performed by: EMERGENCY MEDICINE

## 2024-05-20 RX ORDER — ALBUTEROL SULFATE 90 UG/1
2 AEROSOL, METERED RESPIRATORY (INHALATION) EVERY 6 HOURS PRN
Qty: 1 G | Refills: 0 | Status: SHIPPED | OUTPATIENT
Start: 2024-05-20

## 2024-05-20 RX ORDER — CODEINE PHOSPHATE AND GUAIFENESIN 10; 100 MG/5ML; MG/5ML
5 SOLUTION ORAL ONCE
Status: COMPLETED | OUTPATIENT
Start: 2024-05-20 | End: 2024-05-20

## 2024-05-20 RX ORDER — SERTRALINE HYDROCHLORIDE 25 MG/1
25 TABLET, FILM COATED ORAL DAILY
Qty: 30 TABLET | Refills: 2 | Status: SHIPPED | OUTPATIENT
Start: 2024-05-20 | End: 2024-09-17

## 2024-05-20 RX ORDER — FUROSEMIDE 20 MG/1
20 TABLET ORAL DAILY
Qty: 30 TABLET | Refills: 0 | Status: SHIPPED | OUTPATIENT
Start: 2024-05-20

## 2024-05-20 RX ORDER — CODEINE PHOSPHATE AND GUAIFENESIN 10; 100 MG/5ML; MG/5ML
5 SOLUTION ORAL EVERY 6 HOURS PRN
Qty: 100 ML | Refills: 0 | Status: SHIPPED | OUTPATIENT
Start: 2024-05-20 | End: 2024-05-27

## 2024-05-20 RX ADMIN — GUAIFENESIN AND CODEINE PHOSPHATE 5 ML: 100; 10 SOLUTION ORAL at 15:54

## 2024-05-20 ASSESSMENT — COLUMBIA-SUICIDE SEVERITY RATING SCALE - C-SSRS
1. IN THE PAST MONTH, HAVE YOU WISHED YOU WERE DEAD OR WISHED YOU COULD GO TO SLEEP AND NOT WAKE UP?: NO
6. HAVE YOU EVER DONE ANYTHING, STARTED TO DO ANYTHING, OR PREPARED TO DO ANYTHING TO END YOUR LIFE?: NO
2. HAVE YOU ACTUALLY HAD ANY THOUGHTS OF KILLING YOURSELF?: NO

## 2024-05-20 ASSESSMENT — PAIN SCALES - GENERAL: PAINLEVEL_OUTOF10: 0 - NO PAIN

## 2024-05-20 ASSESSMENT — PAIN - FUNCTIONAL ASSESSMENT: PAIN_FUNCTIONAL_ASSESSMENT: 0-10

## 2024-05-20 NOTE — ED PROVIDER NOTES
HPI   Chief Complaint   Patient presents with    Cough    Shortness of Breath     Patient to ED reference cough/congestion with difficulty breathing with diziness. She was at urgent care and had hypertension. Negative any chest pain except when coughing.       Limitations to History: None    HPI: 84-year-old female presents with concern for cough over the past 1 week.  Productive of green sputum.  Previous nausea without vomiting.  Patient went to urgent care today and blood pressure was over 200.  Denies any chest pain, shortness of breath, abdominal pain, urinary symptoms.    Additional History Obtained from: Significant other at the bedside.    ------------------------------------------------------------------------------------------------------------------------------------------  Physical Exam:    VS: As documented in the triage note and EMR flowsheet from this visit were reviewed.    Appearance: Alert. cooperative,  in no acute distress.   Skin: Intact,  dry skin, no lesions, rash, petechiae or purpura.   Eyes: PERRLA, EOMs intact,  Conjunctiva pink with no redness or exudates.   HENT: Normocephalic, atraumatic. Nares patent. No intraoral lesions.   Neck: Supple, without meningismus. Trachea at midline. No lymphadenopathy.  Pulmonary: Clear bilaterally with good chest wall excursion. No rales, rhonchi or wheezing. No accessory muscle use or stridor.  Cardiac: Regular rate and rhythm, no rubs, murmurs, or gallops.   Abdomen: Abdomen is soft, nontender, and nondistended.  No palpable organomegaly.  No rebound or guarding.  No CVA tenderness. Nonsurgical abdomen.  Genitourinary: Exam deferred.  Musculoskeletal: Full range of motion.  Pulses full and equal. No cyanosis, clubbing, or edema.  Neurological:  Cranial nerves are grossly intact, grossly normal sensation, no weakness, no focal findings identified.  Psychiatric: Appropriate mood and affect.                            No data recorded                    Patient History   Past Medical History:   Diagnosis Date    Acute myocardial infarction, unspecified (Multi)     Acute myocardial infarction    Tinea unguium 09/12/2022    Onychomycosis of toenail     Past Surgical History:   Procedure Laterality Date    OTHER SURGICAL HISTORY  11/15/2019    Hip replacement    OTHER SURGICAL HISTORY  11/15/2019    Lumbar laminectomy    OTHER SURGICAL HISTORY  11/15/2019    Colonoscopy    OTHER SURGICAL HISTORY  12/01/2022    Cardiac catheterization with stent placement     Family History   Problem Relation Name Age of Onset    Heart disease Mother      Hypertension Mother      Lung cancer Mother      Clotting disorder Father      Heart disease Father      Stroke Father      Heart failure Father      Hypertension Father       Social History     Tobacco Use    Smoking status: Never    Smokeless tobacco: Never   Vaping Use    Vaping status: Never Used   Substance Use Topics    Alcohol use: Not Currently    Drug use: Never       Physical Exam   ED Triage Vitals [05/20/24 1529]   Temperature Heart Rate Respirations BP   36.9 °C (98.5 °F) 97 (!) 24 (!) 232/92      Pulse Ox Temp Source Heart Rate Source Patient Position   99 % Temporal Monitor Lying      BP Location FiO2 (%)     Right arm --       Physical Exam    ED Course & MDM   Diagnoses as of 05/20/24 1735   Bronchitis   Hypertension, unspecified type       Medical Decision Making  Labs Reviewed  CBC WITH AUTO DIFFERENTIAL - Abnormal     WBC                           7.5                    nRBC                          0.0                    RBC                           3.80 (*)               Hemoglobin                    11.3 (*)               Hematocrit                    35.8 (*)               MCV                           94                     MCH                           29.7                   MCHC                          31.6 (*)               RDW                           12.4                   Platelets                      283                    Neutrophils %                 50.1                   Immature Granulocytes %, Automated   0.3                    Lymphocytes %                 37.9                   Monocytes %                   5.0                    Eosinophils %                 6.4                    Basophils %                   0.3                    Neutrophils Absolute          3.74                   Immature Granulocytes Absolute, Au*   0.02                   Lymphocytes Absolute          2.82                   Monocytes Absolute            0.37                   Eosinophils Absolute          0.48 (*)               Basophils Absolute            0.02                COMPREHENSIVE METABOLIC PANEL - Normal     Glucose                       98                     Sodium                        137                    Potassium                     3.6                    Chloride                      105                    Bicarbonate                   24                     Anion Gap                     12                     Urea Nitrogen                 12                     Creatinine                    0.68                   eGFR                          87                     Calcium                       9.2                    Albumin                       4.1                    Alkaline Phosphatase          101                    Total Protein                 7.8                    AST                           14                     Bilirubin, Total              0.4                    ALT                           8                   TROPONIN I, HIGH SENSITIVITY - Normal     Troponin I, High Sensitivity   4                          Narrative: Less than 99th percentile of normal range cutoff-                  Female and children under 18 years old <14 ng/L; Male <21 ng/L: Negative                  Repeat testing should be performed if clinically indicated.                                     Female and children  under 18 years old 14-50 ng/L; Male 21-50 ng/L:                  Consistent with possible cardiac damage and possible increased clinical                   risk. Serial measurements may help to assess extent of myocardial damage.                                     >50 ng/L: Consistent with cardiac damage, increased clinical risk and                  myocardial infarction. Serial measurements may help assess extent of                   myocardial damage.                                      NOTE: Children less than 1 year old may have higher baseline troponin                   levels and results should be interpreted in conjunction with the overall                   clinical context.                                     NOTE: Troponin I testing is performed using a different                   testing methodology at [facility] than at other                   [facility]. Direct result comparisons should only                   be made within the same method.  B-TYPE NATRIURETIC PEPTIDE - Normal     BNP                           37                         Narrative:    <100 pg/mL - Heart failure unlikely                  100-299 pg/mL - Intermediate probability of acute heart                                  failure exacerbation. Correlate with clinical                                  context and patient history.                    >=300 pg/mL - Heart Failure likely. Correlate with clinical                                  context and patient history.                                    BNP testing is performed using different testing methodology at [facility] than at other [facility]. Direct result comparisons should only be made within the same method.                     SARS-COV-2 PCR - Normal     Coronavirus 2019, PCR                                    Narrative: This assay has received FDA Emergency Use Authorization (EUA) and is only authorized for the duration of time that  circumstances exist to justify the authorization of the emergency use of in vitro diagnostic tests for the detection of SARS-CoV-2 virus and/or diagnosis of COVID-19 infection under section 564(b)(1) of the Act, 21 U.S.C. 360bbb-3(b)(1). This assay is an in vitro diagnostic nucleic acid amplification test for the qualitative detection of SARS-CoV-2 from nasopharyngeal specimens and has been validated for use at [facility]. Negative results do not preclude COVID-19 infections and should not be used as the sole basis for diagnosis, treatment, or other management decisions.                    INFLUENZA A AND B PCR - Normal  XR chest 1 view   Final Result    No acute radiographic abnormality          MACRO:    None.          Signed by: Mabel Burr 5/20/2024 4:16 PM    Dictation workstation:   [id number]     Medical Decision Making:    Patient appears well nontoxic.  Hypertensive upon arrival.  Coughing and tachypneic.  No hypoxemia.  Chest x-ray clear.  Lab work unremarkable other than chronic changes.  Patient's blood pressure spontaneously reduced to 180/83.  Patient was treated with guaifenesin with codeine with improvement in her cough.  Patient will be treated with albuterol as well as cough medicine with codeine at home.  Advised on follow-up with primary care.  She has appointment in 3 days.  Asked return for new or worsening symptoms.  Patient and  agreeable and discharged home in stable condition.    Differential Diagnoses Considered: Bronchitis, pneumonia, pneumothorax, ACS, hypertension, electrolyte abnormality, volume depletion    Independent Interpretation of Studies:  I independently interpreted: Chest x-ray shows no evidence of pneumonia or pneumothorax.    Escalation of Care:  Appropriate for discharge and follow-up with primary care.    Prescription Drug Consideration: Albuterol inhaler and oral guaifenesin with codeine          Procedure  ECG 12 lead    Performed  by: Tuan Pat DO  Authorized by: Tuan Pat DO    ECG interpreted by ED Physician in the absence of a cardiologist: yes    Comments:      EKG interpreted by Dr. Tuan Pat: Normal sinus rhythm at 98 bpm.  WV interval 130 ms.  QTc of 457 ms.  PVC.  Bifascicular block.  Nonspecific ST changes.       Tuan Pat DO  05/20/24 1736

## 2024-05-22 NOTE — PROGRESS NOTES
Cardiology Subsequent Encounter Clinic Note  Name: Tierney Madrid  MRN: [id number]  : 1940    CC: Coronary artery disease    Active Issues:  Tierney Madird is a 84 y.o. female with a medical history of coronary artery disease (stents to the RCA 2016, mid LAD 2017, RCA 2018 in 2019), supraventricular tachyarrhythmia, hypertension, hyperlipidemia here for evaluation of the following complaints:     Coronary artery disease as described above  -Current medical therapy includes aspirin 81 mg, atorvastatin 20 mg, Coreg 12.5 mg twice daily  -Due to worsening dyspnea with exertion the patient underwent a cardiac catheterization 2022; underwent IVUS guided PCI of mid/distal LAD. Has been compliant with her aspirin/Plavix.      Since her last visit the patient has had issues with markedly uncontrolled blood pressures.  Her blood pressure fluctuate widely at home with readings between 110-200 mmHg systolic.  Blood pressure in clinic is 190 systolic but she has not taken any of her 4 antihypertensive medications today.  She also endorses chest discomfort that is substernal radiating to her shoulder and neck that occasionally occurs with exertion and occasionally without.  She also endorses worsening dyspnea with exertion.  Denies any orthopnea/PND.  Does not appear to have worsening lower extremity edema.  She notes that she is otherwise compliant with her medications but only forgot her medications today.      Past Medical History  Past Medical History:   Diagnosis Date    Acute myocardial infarction, unspecified (Multi)     Acute myocardial infarction    Tinea unguium 2022    Onychomycosis of toenail       Past Surgical History  Past Surgical History:   Procedure Laterality Date    OTHER SURGICAL HISTORY  11/15/2019    Hip replacement    OTHER SURGICAL HISTORY  11/15/2019    Lumbar laminectomy    OTHER SURGICAL HISTORY  11/15/2019    Colonoscopy    OTHER SURGICAL HISTORY   12/01/2022    Cardiac catheterization with stent placement       Medications  Current Outpatient Medications on File Prior to Visit   Medication Sig Dispense Refill    acetaminophen (Tylenol) 500 mg tablet Take 2 tablets (1,000 mg) by mouth every 8 hours if needed for mild pain (1 - 3).      albuterol 90 mcg/actuation inhaler Inhale 2 puffs every 6 hours if needed for wheezing or shortness of breath. 1 g 0    amLODIPine (Norvasc) 10 mg tablet Take 1 tablet (10 mg) by mouth once daily. 30 tablet 11    aspirin 81 mg EC tablet Take 1 tablet (81 mg) by mouth once daily.      azelastine (Optivar) 0.05 % ophthalmic solution Administer 1 drop into both eyes 2 times a day. 6 mL 0    carvedilol (Coreg) 12.5 mg tablet Take 1 tablet (12.5 mg) by mouth 2 times a day with meals. other 30 tablet 11    cetirizine (ZyrTEC) 5 mg tablet Take 1 tablet (5 mg) by mouth once daily.      clopidogrel (Plavix) 75 mg tablet Take 1 tablet (75 mg) by mouth once daily. 30 tablet 11    codeine-guaifenesin (Robitussin-AC)  mg/5 mL syrup Take 5 mL by mouth every 6 hours if needed for cough for up to 7 days. 100 mL 0    fluticasone (Flonase) 50 mcg/actuation nasal spray Administer 1 spray into affected nostril(s) twice a day. Instill 1 squirt      furosemide (Lasix) 20 mg tablet Take 1 tablet by mouth once daily 30 tablet 0    isosorbide mononitrate ER (Imdur) 60 mg 24 hr tablet Take 1 tablet (60 mg) by mouth once daily. 30 tablet 11    L.acid,para/B.bifidum/S.therm (RISAQUAD ORAL) Take 1 capsule by mouth once daily.      lisinopril 40 mg tablet Take 1 tablet (40 mg) by mouth once daily. (Patient taking differently: Take 0.5 tablets (20 mg) by mouth once daily.) 30 tablet 11    loratadine (Claritin) 10 mg tablet Take 1 tablet (10 mg) by mouth once daily as needed for allergies. Allergy tabs      naloxone (Narcan) 4 mg/0.1 mL nasal spray Administer 1 spray (4 mg) into affected nostril(s) if needed (unresponsiveness).      nitroglycerin  (Nitrostat) 0.4 mg SL tablet Place 1 tablet (0.4 mg) under the tongue every 5 minutes if needed for chest pain. For up to 3 doses as needed for chest pain. Call 911 if pain persists 25 tablet 0    oxyCODONE (Roxicodone) 10 mg immediate release tablet Take 1 tablet (10 mg) by mouth 3 times a day. 90 tablet 0    pantoprazole (ProtoNix) 40 mg EC tablet Take 1 tablet (40 mg) by mouth once daily. 30 tablet 11    pramipexole (Mirapex) 0.5 mg tablet Take 1 tablet (0.5 mg) by mouth 2 times a day. (Patient taking differently: Take 2 tablets (1 mg) by mouth once daily.) 60 tablet 11    sertraline (Zoloft) 25 mg tablet Take 1 tablet (25 mg) by mouth once daily. 30 tablet 2    [DISCONTINUED] furosemide (Lasix) 20 mg tablet Take 1 tablet (20 mg) by mouth once daily. 30 tablet 11    [DISCONTINUED] oxyCODONE (Roxicodone) 10 mg immediate release tablet Take 1 tablet (10 mg) by mouth 3 times a day. 90 tablet 0    [DISCONTINUED] sertraline (Zoloft) 25 mg tablet Take 1 tablet (25 mg) by mouth once daily. 30 tablet 2     No current facility-administered medications on file prior to visit.       Allergies  Allergies   Allergen Reactions    Diazepam Unknown, Other and Seizure     Causes - Seizures    Gabapentin Unknown and Other    Promethazine Unknown    Zolpidem Unknown, Insomnia and Other     Insomnia    Doxycycline Nausea/vomiting       Social History  Social History     Tobacco Use    Smoking status: Never    Smokeless tobacco: Never   Vaping Use    Vaping status: Never Used   Substance Use Topics    Alcohol use: Not Currently    Drug use: Never       Family History  Family History   Problem Relation Name Age of Onset    Heart disease Mother      Hypertension Mother      Lung cancer Mother      Clotting disorder Father      Heart disease Father      Stroke Father      Heart failure Father      Hypertension Father         Physical Examination  Vitals: BP (!) 194/100 (BP Location: Right arm, Patient Position: Sitting)   Pulse 87    "Ht 1.626 m (5' 4\")   Wt 56.7 kg (125 lb)   SpO2 99%   BMI 21.46 kg/m²   General: awake, alert and oriented. No acute distress.   Skin: Skin is warm, dry and intact without rashes or lesions. Appropriate color for ethnicity. Nail beds pink with no cyanosis or clubbing  HEENT: normocephalic, atraumatic; conjunctivae are clear without exudates or hemorrhage. Sclera is non-icteric. Eyelids are normal in appearance without swelling or lesions. Hearing intact. Nares are patent bilaterally. Moist mucous membranes.   Cardiovascular: Regular. No murmurs, gallops, or rubs are auscultated. S1 and S2 are heard and are of normal intensity. No JVD, no carotid bruits  Respiratory: Thorax symmetric. CTAB, breath sounds vesicular. No crackles, wheezes or ronchi.   Gastrointestinal: soft, non-distended, BS + x 4  Genitourinary: exam deferred  Musculoskeletal: moves all extremities  Extremities: pulses palpable bilaterally; no swelling or erythema; no edema  Neurological: alert & oriented x 3; no focal deficits  Psychiatric: appropriate mood and affect       Labs/Imaging/Procedures    Lab Results   Component Value Date    HGB 11.3 (L) 05/20/2024    HGB 11.4 (L) 02/13/2024    HGB 11.8 (L) 08/30/2023    HGB 9.3 (L) 05/29/2023    HGB 9.7 (L) 11/07/2022     05/20/2024    WBC 7.5 05/20/2024     05/20/2024    K 3.6 05/20/2024    CREATININE 0.68 05/20/2024    CREATININE 0.77 02/13/2024    CREATININE 0.74 08/30/2023    CREATININE 1.37 (H) 05/29/2023    CREATININE 1.08 (H) 11/07/2022    BUN 12 05/20/2024    CALCIUM 9.2 05/20/2024    INR 1.0 11/09/2022    BNP 37 05/20/2024    TROPHS 4 05/20/2024    TROPHS 4 02/13/2024    TROPHS 4 02/13/2024     No echocardiogram results found for the past 12 months      Jorge Luis Madrid is a 84 y.o. female with a medical history of coronary artery disease (stents to the RCA 12/2016, mid LAD 11/2017, RCA October 2018 in September 2019), supraventricular tachyarrhythmia, hypertension, " hyperlipidemia here for evaluation of the following complaints:     Coronary artery disease as described above  -Current medical therapy includes aspirin 81 mg, atorvastatin 20 mg, Coreg 12.5 mg twice daily  -underwent a cardiac catheterization November 9, 2022; underwent IVUS guided PCI of mid/distal LAD. Has been compliant with her aspirin/Plavix.      On this visit the chief issue appears to be uncontrolled hypertension and chest discomfort/shortness of breath with exertion.  I am uncertain whether the patient is taking 20 mg or 40 mg of lisinopril.  I asked her to go home and call us with the exact dosage.  If she is on the lower dose will uptitrate lisinopril; if she is on 40 we will add hydralazine 25 mg 3 times daily  I asked her to monitor her blood pressure at home and come in for a blood pressure check next week  Once the blood pressure is under better controlled we will schedule her for left heart catheterization to reassess stent  RTC after [facility]    Eugene White MD  Advanced Heart Failure/Transplant Cardiology  Cardio-Oncology  [facility]

## 2024-05-23 ENCOUNTER — OFFICE VISIT (OUTPATIENT)
Dept: CARDIOLOGY | Facility: CLINIC | Age: 84
End: 2024-05-23
Payer: MEDICARE

## 2024-05-23 VITALS
BODY MASS INDEX: 21.34 KG/M2 | OXYGEN SATURATION: 99 % | WEIGHT: 125 LBS | HEART RATE: 87 BPM | HEIGHT: 64 IN | DIASTOLIC BLOOD PRESSURE: 100 MMHG | SYSTOLIC BLOOD PRESSURE: 194 MMHG

## 2024-05-23 DIAGNOSIS — R07.89 OTHER CHEST PAIN: Primary | ICD-10-CM

## 2024-05-23 DIAGNOSIS — I25.119 CORONARY ARTERY DISEASE INVOLVING NATIVE CORONARY ARTERY OF NATIVE HEART WITH ANGINA PECTORIS (CMS-HCC): ICD-10-CM

## 2024-05-23 DIAGNOSIS — I10 PRIMARY HYPERTENSION: ICD-10-CM

## 2024-05-23 DIAGNOSIS — I10 UNCONTROLLED HYPERTENSION: ICD-10-CM

## 2024-05-23 PROCEDURE — 3080F DIAST BP >= 90 MM HG: CPT | Performed by: STUDENT IN AN ORGANIZED HEALTH CARE EDUCATION/TRAINING PROGRAM

## 2024-05-23 PROCEDURE — 99215 OFFICE O/P EST HI 40 MIN: CPT | Performed by: STUDENT IN AN ORGANIZED HEALTH CARE EDUCATION/TRAINING PROGRAM

## 2024-05-23 PROCEDURE — 3077F SYST BP >= 140 MM HG: CPT | Performed by: STUDENT IN AN ORGANIZED HEALTH CARE EDUCATION/TRAINING PROGRAM

## 2024-05-23 PROCEDURE — 1159F MED LIST DOCD IN RCRD: CPT | Performed by: STUDENT IN AN ORGANIZED HEALTH CARE EDUCATION/TRAINING PROGRAM

## 2024-05-23 PROCEDURE — 1036F TOBACCO NON-USER: CPT | Performed by: STUDENT IN AN ORGANIZED HEALTH CARE EDUCATION/TRAINING PROGRAM

## 2024-05-23 PROCEDURE — 1160F RVW MEDS BY RX/DR IN RCRD: CPT | Performed by: STUDENT IN AN ORGANIZED HEALTH CARE EDUCATION/TRAINING PROGRAM

## 2024-05-23 RX ORDER — LISINOPRIL 20 MG/1
20 TABLET ORAL DAILY
Qty: 90 TABLET | Refills: 3 | Status: SHIPPED | OUTPATIENT
Start: 2024-05-23 | End: 2025-05-23

## 2024-05-25 LAB
ATRIAL RATE: 98 BPM
P AXIS: 63 DEGREES
P OFFSET: 205 MS
P ONSET: 158 MS
PR INTERVAL: 130 MS
Q ONSET: 223 MS
QRS COUNT: 16 BEATS
QRS DURATION: 100 MS
QT INTERVAL: 358 MS
QTC CALCULATION(BAZETT): 457 MS
QTC FREDERICIA: 421 MS
R AXIS: -45 DEGREES
T AXIS: 55 DEGREES
T OFFSET: 402 MS
VENTRICULAR RATE: 98 BPM

## 2024-05-28 ENCOUNTER — TELEPHONE (OUTPATIENT)
Dept: CARDIOLOGY | Facility: CLINIC | Age: 84
End: 2024-05-28
Payer: MEDICARE

## 2024-05-28 PROBLEM — R07.89 OTHER CHEST PAIN: Status: ACTIVE | Noted: 2024-05-23

## 2024-05-28 NOTE — TELEPHONE ENCOUNTER
Pt notified of LHC scheduled on   6/24 with Dr. Rocha at [facility]   . Arrive at  .7a, Pt to be NPO after midnight.  Hold Lasix am of procedure. Can take rest of am meds am of procedure. Will need labs.

## 2024-05-29 ENCOUNTER — OFFICE VISIT (OUTPATIENT)
Dept: CARDIOLOGY | Facility: CLINIC | Age: 84
End: 2024-05-29
Payer: MEDICARE

## 2024-05-29 ENCOUNTER — TELEPHONE (OUTPATIENT)
Dept: CARDIOLOGY | Facility: CLINIC | Age: 84
End: 2024-05-29

## 2024-05-29 VITALS
BODY MASS INDEX: 22.02 KG/M2 | HEIGHT: 64 IN | SYSTOLIC BLOOD PRESSURE: 172 MMHG | DIASTOLIC BLOOD PRESSURE: 94 MMHG | WEIGHT: 129 LBS | OXYGEN SATURATION: 97 % | HEART RATE: 85 BPM

## 2024-05-29 DIAGNOSIS — I10 HYPERTENSION, UNSPECIFIED TYPE: ICD-10-CM

## 2024-05-29 PROCEDURE — 3077F SYST BP >= 140 MM HG: CPT

## 2024-05-29 PROCEDURE — 1160F RVW MEDS BY RX/DR IN RCRD: CPT

## 2024-05-29 PROCEDURE — 99212 OFFICE O/P EST SF 10 MIN: CPT

## 2024-05-29 PROCEDURE — 1159F MED LIST DOCD IN RCRD: CPT

## 2024-05-29 PROCEDURE — 3080F DIAST BP >= 90 MM HG: CPT

## 2024-05-29 RX ORDER — HYDRALAZINE HYDROCHLORIDE 25 MG/1
25 TABLET, FILM COATED ORAL 3 TIMES DAILY
Qty: 90 TABLET | Refills: 11 | Status: SHIPPED | OUTPATIENT
Start: 2024-05-29 | End: 2025-05-29

## 2024-05-29 NOTE — TELEPHONE ENCOUNTER
Per Dr. White, based on today BP reading at nurse visit, he would like to add hydralazine 25mg TID. Proposed.

## 2024-05-30 NOTE — PROGRESS NOTES
Blood pressure check. Has minimal chest pain and occasional SOB. Denies any other complaints per Jacqueline Darden MA. Discussed with ALBERT Arana. Per Yasmeen confirm with patient that she is still currently taking her bp medications and consult with Dr White. Please see telephone note for Dr. White recommendations.

## 2024-06-14 ENCOUNTER — TELEPHONE (OUTPATIENT)
Dept: PRIMARY CARE | Facility: CLINIC | Age: 84
End: 2024-06-14
Payer: MEDICARE

## 2024-06-14 DIAGNOSIS — Z88.9 HISTORY OF SEASONAL ALLERGIES: ICD-10-CM

## 2024-06-14 DIAGNOSIS — R60.0 BILATERAL EDEMA OF LOWER EXTREMITY: ICD-10-CM

## 2024-06-14 RX ORDER — FUROSEMIDE 20 MG/1
20 TABLET ORAL DAILY
Qty: 30 TABLET | Refills: 0 | Status: SHIPPED | OUTPATIENT
Start: 2024-06-14

## 2024-06-14 RX ORDER — FLUTICASONE PROPIONATE 50 MCG
1 SPRAY, SUSPENSION (ML) NASAL 2 TIMES DAILY
Qty: 16 G | Refills: 0 | Status: SHIPPED | OUTPATIENT
Start: 2024-06-14 | End: 2024-07-14

## 2024-06-14 NOTE — TELEPHONE ENCOUNTER
Patient called in and would like a refill of her nose spray. She didn't know the name of the medication. She would like this called into [facility] pharmacy    Thank you

## 2024-06-17 ENCOUNTER — TELEPHONE (OUTPATIENT)
Dept: PRIMARY CARE | Facility: CLINIC | Age: 84
End: 2024-06-17
Payer: MEDICARE

## 2024-06-17 ENCOUNTER — LAB (OUTPATIENT)
Dept: LAB | Facility: LAB | Age: 84
End: 2024-06-17
Payer: MEDICARE

## 2024-06-17 DIAGNOSIS — M48.061 SPINAL STENOSIS OF LUMBAR REGION, UNSPECIFIED WHETHER NEUROGENIC CLAUDICATION PRESENT: ICD-10-CM

## 2024-06-17 DIAGNOSIS — I10 UNCONTROLLED HYPERTENSION: ICD-10-CM

## 2024-06-17 DIAGNOSIS — R07.89 OTHER CHEST PAIN: ICD-10-CM

## 2024-06-17 DIAGNOSIS — I25.119 CORONARY ARTERY DISEASE INVOLVING NATIVE CORONARY ARTERY OF NATIVE HEART WITH ANGINA PECTORIS (CMS-HCC): ICD-10-CM

## 2024-06-17 LAB
ANION GAP SERPL CALC-SCNC: 13 MMOL/L (ref 10–20)
BASOPHILS # BLD AUTO: 0.04 X10*3/UL (ref 0–0.1)
BASOPHILS NFR BLD AUTO: 0.7 %
BUN SERPL-MCNC: 18 MG/DL (ref 6–23)
CALCIUM SERPL-MCNC: 8.5 MG/DL (ref 8.6–10.3)
CHLORIDE SERPL-SCNC: 105 MMOL/L (ref 98–107)
CO2 SERPL-SCNC: 23 MMOL/L (ref 21–32)
CREAT SERPL-MCNC: 0.72 MG/DL (ref 0.5–1.05)
EGFRCR SERPLBLD CKD-EPI 2021: 83 ML/MIN/1.73M*2
EOSINOPHIL # BLD AUTO: 0.54 X10*3/UL (ref 0–0.4)
EOSINOPHIL NFR BLD AUTO: 9.4 %
ERYTHROCYTE [DISTWIDTH] IN BLOOD BY AUTOMATED COUNT: 12.7 % (ref 11.5–14.5)
GLUCOSE SERPL-MCNC: 94 MG/DL (ref 74–99)
HCT VFR BLD AUTO: 33.6 % (ref 36–46)
HGB BLD-MCNC: 10.6 G/DL (ref 12–16)
IMM GRANULOCYTES # BLD AUTO: 0.01 X10*3/UL (ref 0–0.5)
IMM GRANULOCYTES NFR BLD AUTO: 0.2 % (ref 0–0.9)
INR PPP: 1 (ref 0.9–1.1)
LYMPHOCYTES # BLD AUTO: 3.37 X10*3/UL (ref 0.8–3)
LYMPHOCYTES NFR BLD AUTO: 58.5 %
MCH RBC QN AUTO: 30.3 PG (ref 26–34)
MCHC RBC AUTO-ENTMCNC: 31.5 G/DL (ref 32–36)
MCV RBC AUTO: 96 FL (ref 80–100)
MONOCYTES # BLD AUTO: 0.45 X10*3/UL (ref 0.05–0.8)
MONOCYTES NFR BLD AUTO: 7.8 %
NEUTROPHILS # BLD AUTO: 1.35 X10*3/UL (ref 1.6–5.5)
NEUTROPHILS NFR BLD AUTO: 23.4 %
NRBC BLD-RTO: 0 /100 WBCS (ref 0–0)
PLATELET # BLD AUTO: 319 X10*3/UL (ref 150–450)
POTASSIUM SERPL-SCNC: 4.5 MMOL/L (ref 3.5–5.3)
PROTHROMBIN TIME: 11.9 SECONDS (ref 9.8–12.8)
RBC # BLD AUTO: 3.5 X10*6/UL (ref 4–5.2)
SODIUM SERPL-SCNC: 136 MMOL/L (ref 136–145)
WBC # BLD AUTO: 5.8 X10*3/UL (ref 4.4–11.3)

## 2024-06-17 PROCEDURE — 85610 PROTHROMBIN TIME: CPT

## 2024-06-17 PROCEDURE — 85025 COMPLETE CBC W/AUTO DIFF WBC: CPT

## 2024-06-17 PROCEDURE — 80048 BASIC METABOLIC PNL TOTAL CA: CPT

## 2024-06-17 PROCEDURE — 36415 COLL VENOUS BLD VENIPUNCTURE: CPT

## 2024-06-17 RX ORDER — OXYCODONE HYDROCHLORIDE 10 MG/1
10 TABLET ORAL 3 TIMES DAILY
Qty: 90 TABLET | Refills: 0 | Status: SHIPPED | OUTPATIENT
Start: 2024-06-17 | End: 2024-07-17

## 2024-06-17 NOTE — TELEPHONE ENCOUNTER
Patient called in and would like a refill of the following medication...oxyCODONE (Roxicodone) 10 mg immediate release tablet  called into [facility] pharmacy in [place].

## 2024-06-24 ENCOUNTER — APPOINTMENT (OUTPATIENT)
Dept: CARDIOLOGY | Facility: HOSPITAL | Age: 84
End: 2024-06-24
Payer: MEDICARE

## 2024-06-24 ENCOUNTER — APPOINTMENT (OUTPATIENT)
Dept: RADIOLOGY | Facility: HOSPITAL | Age: 84
End: 2024-06-24
Payer: MEDICARE

## 2024-06-24 ENCOUNTER — HOSPITAL ENCOUNTER (INPATIENT)
Facility: HOSPITAL | Age: 84
LOS: 4 days | Discharge: HOME | End: 2024-06-28
Attending: STUDENT IN AN ORGANIZED HEALTH CARE EDUCATION/TRAINING PROGRAM
Payer: MEDICARE

## 2024-06-24 ENCOUNTER — DOCUMENTATION (OUTPATIENT)
Dept: CARDIOLOGY | Facility: HOSPITAL | Age: 84
End: 2024-06-24
Payer: MEDICARE

## 2024-06-24 DIAGNOSIS — R09.89 OTHER SPECIFIED SYMPTOMS AND SIGNS INVOLVING THE CIRCULATORY AND RESPIRATORY SYSTEMS: ICD-10-CM

## 2024-06-24 DIAGNOSIS — I25.111 ATHEROSCLEROTIC HEART DISEASE OF NATIVE CORONARY ARTERY WITH ANGINA PECTORIS WITH DOCUMENTED SPASM (CMS-HCC): ICD-10-CM

## 2024-06-24 DIAGNOSIS — Z95.5 PRESENCE OF DRUG COATED STENT IN CORONARY ARTERY: ICD-10-CM

## 2024-06-24 DIAGNOSIS — R07.89 OTHER CHEST PAIN: Primary | ICD-10-CM

## 2024-06-24 DIAGNOSIS — R47.01 APHASIA OF UNKNOWN ORIGIN: ICD-10-CM

## 2024-06-24 DIAGNOSIS — G45.9 TIA (TRANSIENT ISCHEMIC ATTACK): ICD-10-CM

## 2024-06-24 DIAGNOSIS — I25.83 CORONARY ARTERY DISEASE DUE TO LIPID RICH PLAQUE: ICD-10-CM

## 2024-06-24 DIAGNOSIS — I25.10 CORONARY ARTERY DISEASE DUE TO LIPID RICH PLAQUE: ICD-10-CM

## 2024-06-24 DIAGNOSIS — K59.00 CONSTIPATION, UNSPECIFIED CONSTIPATION TYPE: ICD-10-CM

## 2024-06-24 DIAGNOSIS — T82.855A CORONARY STENT RESTENOSIS, INITIAL ENCOUNTER: ICD-10-CM

## 2024-06-24 DIAGNOSIS — I63.89 OTHER CEREBRAL INFARCTION (MULTI): ICD-10-CM

## 2024-06-24 PROBLEM — R41.0 DELIRIUM: Status: ACTIVE | Noted: 2024-06-24

## 2024-06-24 PROBLEM — I16.0 HYPERTENSIVE URGENCY: Status: ACTIVE | Noted: 2024-06-24

## 2024-06-24 LAB
ALBUMIN SERPL BCP-MCNC: 4.2 G/DL (ref 3.4–5)
ALP SERPL-CCNC: 104 U/L (ref 33–136)
ALT SERPL W P-5'-P-CCNC: 8 U/L (ref 7–45)
ANION GAP SERPL CALC-SCNC: 14 MMOL/L (ref 10–20)
AST SERPL W P-5'-P-CCNC: 23 U/L (ref 9–39)
ATRIAL RATE: 82 BPM
BASOPHILS # BLD AUTO: 0.03 X10*3/UL (ref 0–0.1)
BASOPHILS NFR BLD AUTO: 0.3 %
BILIRUB SERPL-MCNC: 0.5 MG/DL (ref 0–1.2)
BNP SERPL-MCNC: 97 PG/ML (ref 0–99)
BUN SERPL-MCNC: 17 MG/DL (ref 6–23)
CALCIUM SERPL-MCNC: 9.2 MG/DL (ref 8.6–10.3)
CHLORIDE SERPL-SCNC: 105 MMOL/L (ref 98–107)
CHOLEST SERPL-MCNC: 275 MG/DL (ref 0–199)
CHOLESTEROL/HDL RATIO: 3.6
CO2 SERPL-SCNC: 22 MMOL/L (ref 21–32)
CREAT SERPL-MCNC: 0.69 MG/DL (ref 0.5–1.05)
EGFRCR SERPLBLD CKD-EPI 2021: 86 ML/MIN/1.73M*2
EOSINOPHIL # BLD AUTO: 0.04 X10*3/UL (ref 0–0.4)
EOSINOPHIL NFR BLD AUTO: 0.4 %
ERYTHROCYTE [DISTWIDTH] IN BLOOD BY AUTOMATED COUNT: 12.9 % (ref 11.5–14.5)
EST. AVERAGE GLUCOSE BLD GHB EST-MCNC: 123 MG/DL
GLUCOSE BLD MANUAL STRIP-MCNC: 110 MG/DL (ref 74–99)
GLUCOSE BLD MANUAL STRIP-MCNC: 150 MG/DL (ref 74–99)
GLUCOSE BLD MANUAL STRIP-MCNC: 75 MG/DL (ref 74–99)
GLUCOSE BLD MANUAL STRIP-MCNC: 98 MG/DL (ref 74–99)
GLUCOSE SERPL-MCNC: 115 MG/DL (ref 74–99)
HBA1C MFR BLD: 5.9 %
HCT VFR BLD AUTO: 40.3 % (ref 36–46)
HDLC SERPL-MCNC: 76 MG/DL
HGB BLD-MCNC: 12.9 G/DL (ref 12–16)
HOLD SPECIMEN: NORMAL
IMM GRANULOCYTES # BLD AUTO: 0.02 X10*3/UL (ref 0–0.5)
IMM GRANULOCYTES NFR BLD AUTO: 0.2 % (ref 0–0.9)
LDLC SERPL CALC-MCNC: 171 MG/DL
LYMPHOCYTES # BLD AUTO: 1.98 X10*3/UL (ref 0.8–3)
LYMPHOCYTES NFR BLD AUTO: 20.3 %
MCH RBC QN AUTO: 30.4 PG (ref 26–34)
MCHC RBC AUTO-ENTMCNC: 32 G/DL (ref 32–36)
MCV RBC AUTO: 95 FL (ref 80–100)
MONOCYTES # BLD AUTO: 0.48 X10*3/UL (ref 0.05–0.8)
MONOCYTES NFR BLD AUTO: 4.9 %
NEUTROPHILS # BLD AUTO: 7.21 X10*3/UL (ref 1.6–5.5)
NEUTROPHILS NFR BLD AUTO: 73.9 %
NON HDL CHOLESTEROL: 199 MG/DL (ref 0–149)
NRBC BLD-RTO: 0 /100 WBCS (ref 0–0)
P AXIS: 70 DEGREES
P OFFSET: 192 MS
P ONSET: 141 MS
PLATELET # BLD AUTO: 282 X10*3/UL (ref 150–450)
POTASSIUM SERPL-SCNC: 4 MMOL/L (ref 3.5–5.3)
PR INTERVAL: 152 MS
PROT SERPL-MCNC: 7.6 G/DL (ref 6.4–8.2)
Q ONSET: 217 MS
QRS COUNT: 14 BEATS
QRS DURATION: 72 MS
QT INTERVAL: 374 MS
QTC CALCULATION(BAZETT): 436 MS
QTC FREDERICIA: 415 MS
R AXIS: -42 DEGREES
RBC # BLD AUTO: 4.24 X10*6/UL (ref 4–5.2)
SODIUM SERPL-SCNC: 137 MMOL/L (ref 136–145)
T AXIS: 45 DEGREES
T OFFSET: 404 MS
TRIGL SERPL-MCNC: 140 MG/DL (ref 0–149)
VENTRICULAR RATE: 82 BPM
VLDL: 28 MG/DL (ref 0–40)
WBC # BLD AUTO: 9.8 X10*3/UL (ref 4.4–11.3)

## 2024-06-24 PROCEDURE — 93005 ELECTROCARDIOGRAM TRACING: CPT

## 2024-06-24 PROCEDURE — C1753 CATH, INTRAVAS ULTRASOUND: HCPCS | Performed by: STUDENT IN AN ORGANIZED HEALTH CARE EDUCATION/TRAINING PROGRAM

## 2024-06-24 PROCEDURE — 2500000001 HC RX 250 WO HCPCS SELF ADMINISTERED DRUGS (ALT 637 FOR MEDICARE OP): Performed by: INTERNAL MEDICINE

## 2024-06-24 PROCEDURE — C1769 GUIDE WIRE: HCPCS | Performed by: STUDENT IN AN ORGANIZED HEALTH CARE EDUCATION/TRAINING PROGRAM

## 2024-06-24 PROCEDURE — 84075 ASSAY ALKALINE PHOSPHATASE: CPT | Performed by: STUDENT IN AN ORGANIZED HEALTH CARE EDUCATION/TRAINING PROGRAM

## 2024-06-24 PROCEDURE — 7100000009 HC PHASE TWO TIME - INITIAL BASE CHARGE: Performed by: STUDENT IN AN ORGANIZED HEALTH CARE EDUCATION/TRAINING PROGRAM

## 2024-06-24 PROCEDURE — 99153 MOD SED SAME PHYS/QHP EA: CPT | Performed by: STUDENT IN AN ORGANIZED HEALTH CARE EDUCATION/TRAINING PROGRAM

## 2024-06-24 PROCEDURE — 92978 ENDOLUMINL IVUS OCT C 1ST: CPT | Performed by: STUDENT IN AN ORGANIZED HEALTH CARE EDUCATION/TRAINING PROGRAM

## 2024-06-24 PROCEDURE — 99447 NTRPROF PH1/NTRNET/EHR 11-20: CPT | Performed by: PSYCHIATRY & NEUROLOGY

## 2024-06-24 PROCEDURE — 92979 ENDOLUMINL IVUS OCT C EA: CPT | Performed by: STUDENT IN AN ORGANIZED HEALTH CARE EDUCATION/TRAINING PROGRAM

## 2024-06-24 PROCEDURE — 2500000004 HC RX 250 GENERAL PHARMACY W/ HCPCS (ALT 636 FOR OP/ED): Performed by: STUDENT IN AN ORGANIZED HEALTH CARE EDUCATION/TRAINING PROGRAM

## 2024-06-24 PROCEDURE — 85025 COMPLETE CBC W/AUTO DIFF WBC: CPT | Performed by: STUDENT IN AN ORGANIZED HEALTH CARE EDUCATION/TRAINING PROGRAM

## 2024-06-24 PROCEDURE — 93010 ELECTROCARDIOGRAM REPORT: CPT | Performed by: STUDENT IN AN ORGANIZED HEALTH CARE EDUCATION/TRAINING PROGRAM

## 2024-06-24 PROCEDURE — 70547 MR ANGIOGRAPHY NECK W/O DYE: CPT | Performed by: RADIOLOGY

## 2024-06-24 PROCEDURE — 82947 ASSAY GLUCOSE BLOOD QUANT: CPT

## 2024-06-24 PROCEDURE — 96373 THER/PROPH/DIAG INJ IA: CPT | Performed by: STUDENT IN AN ORGANIZED HEALTH CARE EDUCATION/TRAINING PROGRAM

## 2024-06-24 PROCEDURE — 82947 ASSAY GLUCOSE BLOOD QUANT: CPT | Mod: 91

## 2024-06-24 PROCEDURE — C9600 PERC DRUG-EL COR STENT SING: HCPCS | Performed by: STUDENT IN AN ORGANIZED HEALTH CARE EDUCATION/TRAINING PROGRAM

## 2024-06-24 PROCEDURE — 70450 CT HEAD/BRAIN W/O DYE: CPT

## 2024-06-24 PROCEDURE — 2500000005 HC RX 250 GENERAL PHARMACY W/O HCPCS: Performed by: STUDENT IN AN ORGANIZED HEALTH CARE EDUCATION/TRAINING PROGRAM

## 2024-06-24 PROCEDURE — 70547 MR ANGIOGRAPHY NECK W/O DYE: CPT

## 2024-06-24 PROCEDURE — 2500000005 HC RX 250 GENERAL PHARMACY W/O HCPCS: Performed by: HOSPITALIST

## 2024-06-24 PROCEDURE — 36415 COLL VENOUS BLD VENIPUNCTURE: CPT | Performed by: STUDENT IN AN ORGANIZED HEALTH CARE EDUCATION/TRAINING PROGRAM

## 2024-06-24 PROCEDURE — 2500000004 HC RX 250 GENERAL PHARMACY W/ HCPCS (ALT 636 FOR OP/ED)

## 2024-06-24 PROCEDURE — 2500000001 HC RX 250 WO HCPCS SELF ADMINISTERED DRUGS (ALT 637 FOR MEDICARE OP): Performed by: STUDENT IN AN ORGANIZED HEALTH CARE EDUCATION/TRAINING PROGRAM

## 2024-06-24 PROCEDURE — C1725 CATH, TRANSLUMIN NON-LASER: HCPCS | Performed by: STUDENT IN AN ORGANIZED HEALTH CARE EDUCATION/TRAINING PROGRAM

## 2024-06-24 PROCEDURE — C1894 INTRO/SHEATH, NON-LASER: HCPCS | Performed by: STUDENT IN AN ORGANIZED HEALTH CARE EDUCATION/TRAINING PROGRAM

## 2024-06-24 PROCEDURE — C9601 PERC DRUG-EL COR STENT BRAN: HCPCS | Performed by: STUDENT IN AN ORGANIZED HEALTH CARE EDUCATION/TRAINING PROGRAM

## 2024-06-24 PROCEDURE — 83880 ASSAY OF NATRIURETIC PEPTIDE: CPT | Performed by: HOSPITALIST

## 2024-06-24 PROCEDURE — 99152 MOD SED SAME PHYS/QHP 5/>YRS: CPT | Performed by: STUDENT IN AN ORGANIZED HEALTH CARE EDUCATION/TRAINING PROGRAM

## 2024-06-24 PROCEDURE — 99223 1ST HOSP IP/OBS HIGH 75: CPT | Performed by: HOSPITALIST

## 2024-06-24 PROCEDURE — C1887 CATHETER, GUIDING: HCPCS | Performed by: STUDENT IN AN ORGANIZED HEALTH CARE EDUCATION/TRAINING PROGRAM

## 2024-06-24 PROCEDURE — C1874 STENT, COATED/COV W/DEL SYS: HCPCS | Performed by: STUDENT IN AN ORGANIZED HEALTH CARE EDUCATION/TRAINING PROGRAM

## 2024-06-24 PROCEDURE — 2720000007 HC OR 272 NO HCPCS: Performed by: STUDENT IN AN ORGANIZED HEALTH CARE EDUCATION/TRAINING PROGRAM

## 2024-06-24 PROCEDURE — 99222 1ST HOSP IP/OBS MODERATE 55: CPT | Performed by: STUDENT IN AN ORGANIZED HEALTH CARE EDUCATION/TRAINING PROGRAM

## 2024-06-24 PROCEDURE — 4A023N7 MEASUREMENT OF CARDIAC SAMPLING AND PRESSURE, LEFT HEART, PERCUTANEOUS APPROACH: ICD-10-PCS | Performed by: STUDENT IN AN ORGANIZED HEALTH CARE EDUCATION/TRAINING PROGRAM

## 2024-06-24 PROCEDURE — 2500000004 HC RX 250 GENERAL PHARMACY W/ HCPCS (ALT 636 FOR OP/ED): Performed by: INTERNAL MEDICINE

## 2024-06-24 PROCEDURE — 92928 PRQ TCAT PLMT NTRAC ST 1 LES: CPT | Performed by: STUDENT IN AN ORGANIZED HEALTH CARE EDUCATION/TRAINING PROGRAM

## 2024-06-24 PROCEDURE — 36415 COLL VENOUS BLD VENIPUNCTURE: CPT | Performed by: HOSPITALIST

## 2024-06-24 PROCEDURE — B241ZZ3 ULTRASONOGRAPHY OF MULTIPLE CORONARY ARTERIES, INTRAVASCULAR: ICD-10-PCS | Performed by: STUDENT IN AN ORGANIZED HEALTH CARE EDUCATION/TRAINING PROGRAM

## 2024-06-24 PROCEDURE — 83036 HEMOGLOBIN GLYCOSYLATED A1C: CPT | Performed by: HOSPITALIST

## 2024-06-24 PROCEDURE — B2111ZZ FLUOROSCOPY OF MULTIPLE CORONARY ARTERIES USING LOW OSMOLAR CONTRAST: ICD-10-PCS | Performed by: STUDENT IN AN ORGANIZED HEALTH CARE EDUCATION/TRAINING PROGRAM

## 2024-06-24 PROCEDURE — 9420000001 HC RT PATIENT EDUCATION 5 MIN

## 2024-06-24 PROCEDURE — 93458 L HRT ARTERY/VENTRICLE ANGIO: CPT | Performed by: STUDENT IN AN ORGANIZED HEALTH CARE EDUCATION/TRAINING PROGRAM

## 2024-06-24 PROCEDURE — 70551 MRI BRAIN STEM W/O DYE: CPT

## 2024-06-24 PROCEDURE — 80061 LIPID PANEL: CPT | Performed by: HOSPITALIST

## 2024-06-24 PROCEDURE — 2780000003 HC OR 278 NO HCPCS: Performed by: STUDENT IN AN ORGANIZED HEALTH CARE EDUCATION/TRAINING PROGRAM

## 2024-06-24 PROCEDURE — B2151ZZ FLUOROSCOPY OF LEFT HEART USING LOW OSMOLAR CONTRAST: ICD-10-PCS | Performed by: STUDENT IN AN ORGANIZED HEALTH CARE EDUCATION/TRAINING PROGRAM

## 2024-06-24 PROCEDURE — 2500000004 HC RX 250 GENERAL PHARMACY W/ HCPCS (ALT 636 FOR OP/ED): Performed by: HOSPITALIST

## 2024-06-24 PROCEDURE — 2550000001 HC RX 255 CONTRASTS: Performed by: STUDENT IN AN ORGANIZED HEALTH CARE EDUCATION/TRAINING PROGRAM

## 2024-06-24 PROCEDURE — 70551 MRI BRAIN STEM W/O DYE: CPT | Performed by: RADIOLOGY

## 2024-06-24 PROCEDURE — 7100000010 HC PHASE TWO TIME - EACH INCREMENTAL 1 MINUTE: Performed by: STUDENT IN AN ORGANIZED HEALTH CARE EDUCATION/TRAINING PROGRAM

## 2024-06-24 PROCEDURE — 70544 MR ANGIOGRAPHY HEAD W/O DYE: CPT

## 2024-06-24 PROCEDURE — C1760 CLOSURE DEV, VASC: HCPCS | Performed by: STUDENT IN AN ORGANIZED HEALTH CARE EDUCATION/TRAINING PROGRAM

## 2024-06-24 PROCEDURE — 027136Z DILATION OF CORONARY ARTERY, TWO ARTERIES WITH THREE DRUG-ELUTING INTRALUMINAL DEVICES, PERCUTANEOUS APPROACH: ICD-10-PCS | Performed by: STUDENT IN AN ORGANIZED HEALTH CARE EDUCATION/TRAINING PROGRAM

## 2024-06-24 PROCEDURE — 1200000002 HC GENERAL ROOM WITH TELEMETRY DAILY

## 2024-06-24 PROCEDURE — 2500000001 HC RX 250 WO HCPCS SELF ADMINISTERED DRUGS (ALT 637 FOR MEDICARE OP)

## 2024-06-24 PROCEDURE — 70450 CT HEAD/BRAIN W/O DYE: CPT | Performed by: RADIOLOGY

## 2024-06-24 PROCEDURE — 2500000001 HC RX 250 WO HCPCS SELF ADMINISTERED DRUGS (ALT 637 FOR MEDICARE OP): Performed by: HOSPITALIST

## 2024-06-24 PROCEDURE — 94664 DEMO&/EVAL PT USE INHALER: CPT

## 2024-06-24 PROCEDURE — 94761 N-INVAS EAR/PLS OXIMETRY MLT: CPT

## 2024-06-24 DEVICE — IMPLANTABLE DEVICE: Type: IMPLANTABLE DEVICE | Site: HEART | Status: FUNCTIONAL

## 2024-06-24 DEVICE — STENT ONYXNG27512UX ONYX 2.75X12RX
Type: IMPLANTABLE DEVICE | Site: HEART | Status: FUNCTIONAL
Brand: ONYX FRONTIER™

## 2024-06-24 DEVICE — STENT, ONYX FRONTIER DES, 2.50 X 15RX: Type: IMPLANTABLE DEVICE | Site: HEART | Status: FUNCTIONAL

## 2024-06-24 RX ORDER — PANTOPRAZOLE SODIUM 40 MG/10ML
40 INJECTION, POWDER, LYOPHILIZED, FOR SOLUTION INTRAVENOUS
Status: DISCONTINUED | OUTPATIENT
Start: 2024-06-25 | End: 2024-06-28

## 2024-06-24 RX ORDER — NITROGLYCERIN 0.4 MG/1
0.4 TABLET SUBLINGUAL EVERY 5 MIN PRN
Status: DISCONTINUED | OUTPATIENT
Start: 2024-06-24 | End: 2024-06-28 | Stop reason: HOSPADM

## 2024-06-24 RX ORDER — HYDRALAZINE HYDROCHLORIDE 25 MG/1
25 TABLET, FILM COATED ORAL EVERY 6 HOURS PRN
Status: DISCONTINUED | OUTPATIENT
Start: 2024-06-26 | End: 2024-06-28 | Stop reason: HOSPADM

## 2024-06-24 RX ORDER — NAPROXEN SODIUM 220 MG/1
81 TABLET, FILM COATED ORAL DAILY
Status: DISCONTINUED | OUTPATIENT
Start: 2024-06-25 | End: 2024-06-24

## 2024-06-24 RX ORDER — AMOXICILLIN 250 MG
2 CAPSULE ORAL 2 TIMES DAILY
Status: DISCONTINUED | OUTPATIENT
Start: 2024-06-24 | End: 2024-06-25

## 2024-06-24 RX ORDER — LISINOPRIL 2.5 MG/1
TABLET ORAL AS NEEDED
Status: DISCONTINUED | OUTPATIENT
Start: 2024-06-24 | End: 2024-06-24 | Stop reason: HOSPADM

## 2024-06-24 RX ORDER — PANTOPRAZOLE SODIUM 40 MG/1
40 TABLET, DELAYED RELEASE ORAL
Status: DISCONTINUED | OUTPATIENT
Start: 2024-06-25 | End: 2024-06-25 | Stop reason: CLARIF

## 2024-06-24 RX ORDER — PRAMIPEXOLE DIHYDROCHLORIDE 0.5 MG/1
1 TABLET ORAL DAILY
Status: DISCONTINUED | OUTPATIENT
Start: 2024-06-24 | End: 2024-06-25

## 2024-06-24 RX ORDER — SERTRALINE HYDROCHLORIDE 50 MG/1
25 TABLET, FILM COATED ORAL DAILY
Status: DISCONTINUED | OUTPATIENT
Start: 2024-06-24 | End: 2024-06-25

## 2024-06-24 RX ORDER — NALOXONE HYDROCHLORIDE 4 MG/.1ML
4 SPRAY NASAL AS NEEDED
Status: DISCONTINUED | OUTPATIENT
Start: 2024-06-24 | End: 2024-06-25

## 2024-06-24 RX ORDER — ACETAMINOPHEN 650 MG/1
650 SUPPOSITORY RECTAL ONCE
Status: COMPLETED | OUTPATIENT
Start: 2024-06-24 | End: 2024-06-24

## 2024-06-24 RX ORDER — MORPHINE SULFATE 2 MG/ML
2 INJECTION, SOLUTION INTRAMUSCULAR; INTRAVENOUS ONCE
Status: COMPLETED | OUTPATIENT
Start: 2024-06-24 | End: 2024-06-24

## 2024-06-24 RX ORDER — FUROSEMIDE 20 MG/1
20 TABLET ORAL DAILY
Status: DISCONTINUED | OUTPATIENT
Start: 2024-06-24 | End: 2024-06-25

## 2024-06-24 RX ORDER — AMLODIPINE BESYLATE 2.5 MG/1
TABLET ORAL AS NEEDED
Status: DISCONTINUED | OUTPATIENT
Start: 2024-06-24 | End: 2024-06-24 | Stop reason: HOSPADM

## 2024-06-24 RX ORDER — LISINOPRIL 20 MG/1
20 TABLET ORAL ONCE
Status: DISCONTINUED | OUTPATIENT
Start: 2024-06-24 | End: 2024-06-28 | Stop reason: HOSPADM

## 2024-06-24 RX ORDER — ENOXAPARIN SODIUM 100 MG/ML
40 INJECTION SUBCUTANEOUS EVERY 24 HOURS
Status: DISCONTINUED | OUTPATIENT
Start: 2024-06-24 | End: 2024-06-28 | Stop reason: HOSPADM

## 2024-06-24 RX ORDER — SODIUM CHLORIDE 9 MG/ML
1 INJECTION, SOLUTION INTRAVENOUS CONTINUOUS
Status: ACTIVE | OUTPATIENT
Start: 2024-06-24 | End: 2024-06-24

## 2024-06-24 RX ORDER — DEXTROSE MONOHYDRATE 25 G/50ML
12.5 INJECTION, SOLUTION INTRAVENOUS ONCE
Status: DISCONTINUED | OUTPATIENT
Start: 2024-06-24 | End: 2024-06-24

## 2024-06-24 RX ORDER — FLUTICASONE PROPIONATE 50 MCG
1 SPRAY, SUSPENSION (ML) NASAL 2 TIMES DAILY
Status: DISCONTINUED | OUTPATIENT
Start: 2024-06-24 | End: 2024-06-28 | Stop reason: HOSPADM

## 2024-06-24 RX ORDER — NALOXONE HYDROCHLORIDE 0.4 MG/ML
0.4 INJECTION, SOLUTION INTRAMUSCULAR; INTRAVENOUS; SUBCUTANEOUS ONCE
Status: COMPLETED | OUTPATIENT
Start: 2024-06-24 | End: 2024-06-24

## 2024-06-24 RX ORDER — AMLODIPINE BESYLATE 10 MG/1
10 TABLET ORAL DAILY
Status: DISCONTINUED | OUTPATIENT
Start: 2024-06-24 | End: 2024-06-25

## 2024-06-24 RX ORDER — ATORVASTATIN CALCIUM 40 MG/1
40 TABLET, FILM COATED ORAL DAILY
Qty: 90 TABLET | Refills: 3 | Status: SHIPPED | OUTPATIENT
Start: 2024-06-24 | End: 2025-06-24

## 2024-06-24 RX ORDER — AMLODIPINE BESYLATE 5 MG/1
5 TABLET ORAL ONCE
Status: COMPLETED | OUTPATIENT
Start: 2024-06-24 | End: 2024-06-24

## 2024-06-24 RX ORDER — LABETALOL HYDROCHLORIDE 5 MG/ML
10 INJECTION, SOLUTION INTRAVENOUS EVERY 10 MIN PRN
Status: ACTIVE | OUTPATIENT
Start: 2024-06-24 | End: 2024-06-26

## 2024-06-24 RX ORDER — LIDOCAINE HYDROCHLORIDE 20 MG/ML
INJECTION, SOLUTION INFILTRATION; PERINEURAL AS NEEDED
Status: DISCONTINUED | OUTPATIENT
Start: 2024-06-24 | End: 2024-06-24 | Stop reason: HOSPADM

## 2024-06-24 RX ORDER — CLOPIDOGREL BISULFATE 75 MG/1
75 TABLET ORAL ONCE
Status: COMPLETED | OUTPATIENT
Start: 2024-06-24 | End: 2024-06-24

## 2024-06-24 RX ORDER — MIDAZOLAM HYDROCHLORIDE 1 MG/ML
INJECTION, SOLUTION INTRAMUSCULAR; INTRAVENOUS AS NEEDED
Status: DISCONTINUED | OUTPATIENT
Start: 2024-06-24 | End: 2024-06-24 | Stop reason: HOSPADM

## 2024-06-24 RX ORDER — HYDRALAZINE HYDROCHLORIDE 20 MG/ML
INJECTION INTRAMUSCULAR; INTRAVENOUS AS NEEDED
Status: DISCONTINUED | OUTPATIENT
Start: 2024-06-24 | End: 2024-06-24 | Stop reason: HOSPADM

## 2024-06-24 RX ORDER — HEPARIN SODIUM 1000 [USP'U]/ML
INJECTION, SOLUTION INTRAVENOUS; SUBCUTANEOUS AS NEEDED
Status: DISCONTINUED | OUTPATIENT
Start: 2024-06-24 | End: 2024-06-24 | Stop reason: HOSPADM

## 2024-06-24 RX ORDER — PANTOPRAZOLE SODIUM 40 MG/1
40 TABLET, DELAYED RELEASE ORAL DAILY
Status: DISCONTINUED | OUTPATIENT
Start: 2024-06-24 | End: 2024-06-25 | Stop reason: CLARIF

## 2024-06-24 RX ORDER — LABETALOL HYDROCHLORIDE 5 MG/ML
20 INJECTION, SOLUTION INTRAVENOUS ONCE
Status: COMPLETED | OUTPATIENT
Start: 2024-06-24 | End: 2024-06-24

## 2024-06-24 RX ORDER — CLOPIDOGREL BISULFATE 300 MG/1
TABLET, FILM COATED ORAL AS NEEDED
Status: DISCONTINUED | OUTPATIENT
Start: 2024-06-24 | End: 2024-06-24 | Stop reason: HOSPADM

## 2024-06-24 RX ORDER — POLYETHYLENE GLYCOL 3350 17 G/17G
17 POWDER, FOR SOLUTION ORAL DAILY PRN
Status: DISCONTINUED | OUTPATIENT
Start: 2024-06-24 | End: 2024-06-28 | Stop reason: HOSPADM

## 2024-06-24 RX ORDER — LISINOPRIL 20 MG/1
20 TABLET ORAL DAILY
Status: DISCONTINUED | OUTPATIENT
Start: 2024-06-25 | End: 2024-06-28 | Stop reason: HOSPADM

## 2024-06-24 RX ORDER — ACETAMINOPHEN 325 MG/1
1000 TABLET ORAL EVERY 8 HOURS PRN
Status: DISCONTINUED | OUTPATIENT
Start: 2024-06-24 | End: 2024-06-28 | Stop reason: HOSPADM

## 2024-06-24 RX ORDER — CLOPIDOGREL BISULFATE 75 MG/1
75 TABLET ORAL DAILY
Status: DISCONTINUED | OUTPATIENT
Start: 2024-06-24 | End: 2024-06-25

## 2024-06-24 RX ORDER — NITROGLYCERIN 40 MG/100ML
INJECTION INTRAVENOUS AS NEEDED
Status: DISCONTINUED | OUTPATIENT
Start: 2024-06-24 | End: 2024-06-24 | Stop reason: HOSPADM

## 2024-06-24 RX ORDER — CARVEDILOL 12.5 MG/1
12.5 TABLET ORAL
Status: DISCONTINUED | OUTPATIENT
Start: 2024-06-24 | End: 2024-06-25

## 2024-06-24 RX ORDER — FENTANYL CITRATE 50 UG/ML
INJECTION, SOLUTION INTRAMUSCULAR; INTRAVENOUS AS NEEDED
Status: DISCONTINUED | OUTPATIENT
Start: 2024-06-24 | End: 2024-06-24 | Stop reason: HOSPADM

## 2024-06-24 RX ORDER — HYDRALAZINE HYDROCHLORIDE 20 MG/ML
10 INJECTION INTRAMUSCULAR; INTRAVENOUS
Status: ACTIVE | OUTPATIENT
Start: 2024-06-24 | End: 2024-06-26

## 2024-06-24 RX ORDER — LABETALOL HYDROCHLORIDE 5 MG/ML
INJECTION, SOLUTION INTRAVENOUS AS NEEDED
Status: DISCONTINUED | OUTPATIENT
Start: 2024-06-24 | End: 2024-06-24 | Stop reason: HOSPADM

## 2024-06-24 RX ORDER — ATORVASTATIN CALCIUM 40 MG/1
40 TABLET, FILM COATED ORAL NIGHTLY
Status: DISCONTINUED | OUTPATIENT
Start: 2024-06-24 | End: 2024-06-25

## 2024-06-24 RX ORDER — FLUMAZENIL 0.1 MG/ML
0.2 INJECTION INTRAVENOUS ONCE
Status: COMPLETED | OUTPATIENT
Start: 2024-06-24 | End: 2024-06-24

## 2024-06-24 RX ORDER — ASPIRIN 325 MG
325 TABLET ORAL ONCE
Status: DISCONTINUED | OUTPATIENT
Start: 2024-06-24 | End: 2024-06-24

## 2024-06-24 RX ORDER — ASPIRIN 81 MG/1
81 TABLET ORAL DAILY
Status: DISCONTINUED | OUTPATIENT
Start: 2024-06-25 | End: 2024-06-25 | Stop reason: CLARIF

## 2024-06-24 RX ORDER — ALBUTEROL SULFATE 90 UG/1
2 AEROSOL, METERED RESPIRATORY (INHALATION) EVERY 6 HOURS PRN
Status: DISCONTINUED | OUTPATIENT
Start: 2024-06-24 | End: 2024-06-24

## 2024-06-24 RX ORDER — ACETAMINOPHEN 325 MG/1
650 TABLET ORAL EVERY 4 HOURS PRN
Status: DISCONTINUED | OUTPATIENT
Start: 2024-06-24 | End: 2024-06-28 | Stop reason: HOSPADM

## 2024-06-24 RX ORDER — ISOSORBIDE MONONITRATE 60 MG/1
60 TABLET, EXTENDED RELEASE ORAL DAILY
Status: DISCONTINUED | OUTPATIENT
Start: 2024-06-24 | End: 2024-06-25

## 2024-06-24 RX ORDER — DEXTROSE 50 % IN WATER (D50W) INTRAVENOUS SYRINGE
12.5 ONCE
Status: DISCONTINUED | OUTPATIENT
Start: 2024-06-24 | End: 2024-06-28 | Stop reason: HOSPADM

## 2024-06-24 SDOH — SOCIAL STABILITY: SOCIAL INSECURITY: DOES ANYONE TRY TO KEEP YOU FROM HAVING/CONTACTING OTHER FRIENDS OR DOING THINGS OUTSIDE YOUR HOME?: UNABLE TO ASSESS

## 2024-06-24 SDOH — SOCIAL STABILITY: SOCIAL INSECURITY: ARE YOU OR HAVE YOU BEEN THREATENED OR ABUSED PHYSICALLY, EMOTIONALLY, OR SEXUALLY BY ANYONE?: UNABLE TO ASSESS

## 2024-06-24 SDOH — SOCIAL STABILITY: SOCIAL INSECURITY: ARE THERE ANY APPARENT SIGNS OF INJURIES/BEHAVIORS THAT COULD BE RELATED TO ABUSE/NEGLECT?: UNABLE TO ASSESS

## 2024-06-24 SDOH — SOCIAL STABILITY: SOCIAL INSECURITY: DO YOU FEEL UNSAFE GOING BACK TO THE PLACE WHERE YOU ARE LIVING?: UNABLE TO ASSESS

## 2024-06-24 SDOH — SOCIAL STABILITY: SOCIAL INSECURITY: HAVE YOU HAD THOUGHTS OF HARMING ANYONE ELSE?: NO

## 2024-06-24 SDOH — SOCIAL STABILITY: SOCIAL INSECURITY: DO YOU FEEL ANYONE HAS EXPLOITED OR TAKEN ADVANTAGE OF YOU FINANCIALLY OR OF YOUR PERSONAL PROPERTY?: UNABLE TO ASSESS

## 2024-06-24 SDOH — SOCIAL STABILITY: SOCIAL INSECURITY: WERE YOU ABLE TO COMPLETE ALL THE BEHAVIORAL HEALTH SCREENINGS?: NO

## 2024-06-24 SDOH — SOCIAL STABILITY: SOCIAL INSECURITY: ABUSE: ADULT

## 2024-06-24 SDOH — SOCIAL STABILITY: SOCIAL INSECURITY: HAS ANYONE EVER THREATENED TO HURT YOUR FAMILY OR YOUR PETS?: UNABLE TO ASSESS

## 2024-06-24 ASSESSMENT — PAIN - FUNCTIONAL ASSESSMENT
PAIN_FUNCTIONAL_ASSESSMENT: PAINAD (PAIN ASSESSMENT IN ADVANCED DEMENTIA SCALE)
PAIN_FUNCTIONAL_ASSESSMENT: 0-10
PAIN_FUNCTIONAL_ASSESSMENT: 0-10
PAIN_FUNCTIONAL_ASSESSMENT: PAINAD (PAIN ASSESSMENT IN ADVANCED DEMENTIA SCALE)
PAIN_FUNCTIONAL_ASSESSMENT: 0-10
PAIN_FUNCTIONAL_ASSESSMENT: 0-10
PAIN_FUNCTIONAL_ASSESSMENT: PAINAD (PAIN ASSESSMENT IN ADVANCED DEMENTIA SCALE)
PAIN_FUNCTIONAL_ASSESSMENT: 0-10

## 2024-06-24 ASSESSMENT — PAIN SCALES - GENERAL
PAINLEVEL_OUTOF10: 0 - NO PAIN
PAINLEVEL_OUTOF10: 0 - NO PAIN
PAINLEVEL_OUTOF10: 5 - MODERATE PAIN
PAINLEVEL_OUTOF10: 0 - NO PAIN
PAINLEVEL_OUTOF10: 5 - MODERATE PAIN
PAINLEVEL_OUTOF10: 0 - NO PAIN
PAINLEVEL_OUTOF10: 0 - NO PAIN
PAINLEVEL_OUTOF10: 5 - MODERATE PAIN
PAINLEVEL_OUTOF10: 0 - NO PAIN
PAINLEVEL_OUTOF10: 5 - MODERATE PAIN
PAINLEVEL_OUTOF10: 0 - NO PAIN
PAINLEVEL_OUTOF10: 5 - MODERATE PAIN

## 2024-06-24 ASSESSMENT — COGNITIVE AND FUNCTIONAL STATUS - GENERAL
DAILY ACTIVITIY SCORE: 6
CLIMB 3 TO 5 STEPS WITH RAILING: TOTAL
MOVING TO AND FROM BED TO CHAIR: TOTAL
DAILY ACTIVITIY SCORE: 6
MOVING FROM LYING ON BACK TO SITTING ON SIDE OF FLAT BED WITH BEDRAILS: TOTAL
TURNING FROM BACK TO SIDE WHILE IN FLAT BAD: TOTAL
PERSONAL GROOMING: TOTAL
CLIMB 3 TO 5 STEPS WITH RAILING: TOTAL
EATING MEALS: TOTAL
MOBILITY SCORE: 6
DRESSING REGULAR LOWER BODY CLOTHING: TOTAL
EATING MEALS: TOTAL
WALKING IN HOSPITAL ROOM: TOTAL
TURNING FROM BACK TO SIDE WHILE IN FLAT BAD: TOTAL
WALKING IN HOSPITAL ROOM: TOTAL
PERSONAL GROOMING: TOTAL
PATIENT BASELINE BEDBOUND: NO
DRESSING REGULAR LOWER BODY CLOTHING: TOTAL
MOBILITY SCORE: 6
STANDING UP FROM CHAIR USING ARMS: TOTAL
TOILETING: TOTAL
HELP NEEDED FOR BATHING: TOTAL
MOVING FROM LYING ON BACK TO SITTING ON SIDE OF FLAT BED WITH BEDRAILS: TOTAL
DRESSING REGULAR UPPER BODY CLOTHING: TOTAL
TOILETING: TOTAL
HELP NEEDED FOR BATHING: TOTAL
DRESSING REGULAR UPPER BODY CLOTHING: TOTAL
MOVING TO AND FROM BED TO CHAIR: TOTAL
STANDING UP FROM CHAIR USING ARMS: TOTAL

## 2024-06-24 ASSESSMENT — PAIN SCALES - PAIN ASSESSMENT IN ADVANCED DEMENTIA (PAINAD)
NEGVOCALIZATION: REPEATED TROUBLED CALLING OUT, LOUD MOANING/GROANING, CRYING
FACIALEXPRESSION: FACIAL GRIMACING
FACIALEXPRESSION: SMILING OR INEXPRESSIVE
BODYLANGUAGE: RIGID, FISTS CLENCHED, KNEES UP, PUSHING/PULLING AWAY, STRIKES OUT
BREATHING: NORMAL
BREATHING: OCCASIONAL LABORED BREATHING, SHORT PERIOD OF HYPERVENTILATION
BODYLANGUAGE: RIGID, FISTS CLENCHED, KNEES UP, PUSHING/PULLING AWAY, STRIKES OUT
NEGVOCALIZATION: REPEATED TROUBLED CALLING OUT, LOUD MOANING/GROANING, CRYING
TOTALSCORE: 2
CONSOLABILITY: NO NEED TO CONSOLE
TOTALSCORE: 8
FACIALEXPRESSION: FACIAL GRIMACING
TOTALSCORE: MD NOTIFIED (COMMENT);MEDICATION (SEE MAR)
BREATHING: OCCASIONAL LABORED BREATHING, SHORT PERIOD OF HYPERVENTILATION
BODYLANGUAGE: RIGID, FISTS CLENCHED, KNEES UP, PUSHING/PULLING AWAY, STRIKES OUT
CONSOLABILITY: DISTRACTED OR REASSURED BY VOICE/TOUCH
TOTALSCORE: 8
CONSOLABILITY: DISTRACTED OR REASSURED BY VOICE/TOUCH
TOTALSCORE: IMPROVED

## 2024-06-24 ASSESSMENT — PATIENT HEALTH QUESTIONNAIRE - PHQ9
1. LITTLE INTEREST OR PLEASURE IN DOING THINGS: NOT AT ALL
2. FEELING DOWN, DEPRESSED OR HOPELESS: NOT AT ALL
SUM OF ALL RESPONSES TO PHQ9 QUESTIONS 1 & 2: 0

## 2024-06-24 ASSESSMENT — ACTIVITIES OF DAILY LIVING (ADL)
HEARING - RIGHT EAR: HEARING AID
GROOMING: DEPENDENT
TOILETING: DEPENDENT
JUDGMENT_ADEQUATE_SAFELY_COMPLETE_DAILY_ACTIVITIES: NO
BATHING: DEPENDENT
PATIENT'S MEMORY ADEQUATE TO SAFELY COMPLETE DAILY ACTIVITIES?: NO
WALKS IN HOME: DEPENDENT
FEEDING YOURSELF: DEPENDENT
ADEQUATE_TO_COMPLETE_ADL: NO
DRESSING YOURSELF: DEPENDENT
HEARING - LEFT EAR: HEARING AID
ASSISTIVE_DEVICE: WALKER
LACK_OF_TRANSPORTATION: PATIENT UNABLE TO ANSWER

## 2024-06-24 ASSESSMENT — LIFESTYLE VARIABLES
HOW OFTEN DO YOU HAVE A DRINK CONTAINING ALCOHOL: NEVER
HOW OFTEN DO YOU HAVE 6 OR MORE DRINKS ON ONE OCCASION: NEVER
SKIP TO QUESTIONS 9-10: 1
HOW MANY STANDARD DRINKS CONTAINING ALCOHOL DO YOU HAVE ON A TYPICAL DAY: PATIENT DOES NOT DRINK
AUDIT-C TOTAL SCORE: 0
AUDIT-C TOTAL SCORE: 0

## 2024-06-24 NOTE — H&P
History Of Present Illness   Tierney Madrid is a 84 y.o. female with a medical history of coronary artery disease (stents to the RCA 12/2016, mid LAD 11/2017, RCA October 2018 in September 2019, mid/distal LAD in November, 2022), supraventricular tachyarrhythmia, hypertension, hyperlipidemia presenting for [facility] due to worsening dyspnea on exertion and substernal chest discomfort with radiation to should and neck.       Past Medical History  Past Medical History:   Diagnosis Date    Acute myocardial infarction, unspecified (Multi)     Acute myocardial infarction    Tinea unguium 09/12/2022    Onychomycosis of toenail       Surgical History  Past Surgical History:   Procedure Laterality Date    OTHER SURGICAL HISTORY  11/15/2019    Hip replacement    OTHER SURGICAL HISTORY  11/15/2019    Lumbar laminectomy    OTHER SURGICAL HISTORY  11/15/2019    Colonoscopy    OTHER SURGICAL HISTORY  12/01/2022    Cardiac catheterization with stent placement        Social History  She reports that she has never smoked. She has never used smokeless tobacco. She reports that she does not currently use alcohol. She reports that she does not use drugs.    Family History  Family History   Problem Relation Name Age of Onset    Heart disease Mother      Hypertension Mother      Lung cancer Mother      Clotting disorder Father      Heart disease Father      Stroke Father      Heart failure Father      Hypertension Father          Allergies  Diazepam, Gabapentin, Promethazine, Zolpidem, and Doxycycline    Review of Systems   A 10-point system review was completed and was negative except as noted in the HPI    Physical Exam   General: awake, alert and oriented. No acute distress.   Skin: Skin is warm, dry and intact without rashes or lesions.   HEENT: normocephalic, atraumatic; conjunctivae are clear without exudates or hemorrhage. Sclera is non-icteric. Eyelids are normal in appearance without swelling or lesions. Hearing intact. Nares are  "patent bilaterally. Moist mucous membranes.   Cardiovascular: heart rate and rhythm are normal. No murmurs, gallops, or rubs are auscultated. S1 and S2 are heard and are of normal intensity. No JVD, no carotid bruits  Respiratory: bilateral lung sounds clear to auscultations without rales, rhonchi, or wheezes. No accessory muscle use or stridor  Gastrointestinal: non-distended, non-tender  Genitourinary: exam deferred  Musculoskeletal: no deformities  Extremities: pulses palpable bilaterally; no swelling or erythema; Telangiectasia to BLLE  Neurological: no focal deficits  Psychiatric: appropriate mood and affect; good judgment and insight    Last Recorded Vitals  Blood pressure (!) 223/94, pulse 86, temperature 37 °C (98.6 °F), temperature source Tympanic, resp. rate 22, height 1.626 m (5' 4\"), weight 58.1 kg (128 lb 1.4 oz), SpO2 98%.    Relevant Results             Assessment/Plan   Principal Problem:    Other chest pain          Yasmeen Lutz, APRN-CNP, DNP    "

## 2024-06-24 NOTE — TELECONSULT
HPI: Telestroke consult at 2:20 pm  84 y.o. female, underwent PCI this morning, was somnolent post-procedure at 10:30 am but able to answer questions, since 1:30 pm is more confused, unable to answer questions.      Last known Well: pre-procedure    NIH Stroke Scale Reported: 9- most points for altered responsiveness, not following commands, with RLE drift in leg that was used for groin access, reported R gaze preference     Imaging Results:  CT Head: no hemorrhage or early infarct signs  CTA Head/Neck: n/a     Assessment:   Working Diagnosis: Ischemic Stroke- with R gaze preference this could suggest L posterior cerebral location,  vs encephalopathy due to other condition       Recommendations:   IV tNK is not recommended/ Rationale uncertain time onset   Patient is NOT a candidate for endovascular treatment/ Rationale pattern/ NIH      Additional Recommendations: recommend MRI with stroke protocol, MRA head/ neck.   Continue dual antiplatelet therapy initiated for PCI.   Admit for neurologic monitoring, evaluation and management.   Stroke best practices- Swallow eval prior to any PO.  Initiate DVT prophylaxis  Risk factor control- Initial permissive hypertension with serial assessments then gradual reduction by increments of 10-15% as tolerated to ultimate goal <130/80, Intensive statin to goal LDL < 70, Glucose < 180 mg/dl, smoking cessation.     Consult completed by: TELEPHONE communication was used to provide this telehealth service.  Time includes consultation with ED provider and extensive review of data- history, medical records, test results, and neuroimaging studies: 11 - 20 mins was spent in consultation

## 2024-06-24 NOTE — NURSING NOTE
I met with Tierney Madrid and her  Nadir in her room in the cardiac cath lab S/P PCI on 6/24/24. She is alert and oriented and open to discussion. I reviewed contact information, allergies, current medications, and past medical history. We discussed what cardiac rehab entails, length of time, session length, education, and when we would be reaching out for Tierney Madrid to start.  Tierney Madrid is unsure at this time in coming to cardiac rehab. Cardiac rehab folder reviewed and provided along with our contact information. We will reach out next week the week of July 1st to discuss further. All questions answered.

## 2024-06-24 NOTE — Clinical Note
Angioplasty of the distal left anterior descending lesion. Inflation 1: Pressure = 15 tone; Duration = 30 sec. Inflation 2: Pressure = 15 tone; Duration = 30 sec.

## 2024-06-24 NOTE — Clinical Note
Vessel: LAD (proximal). Stent inserted. Inflation 1: Pressure = 12 tone; Duration = 15 sec. Inflation 2: Pressure = 15 tone; Duration = 15 sec. Inflation 3: Pressure = 15 tone; Duration = 15 sec.

## 2024-06-24 NOTE — DISCHARGE INSTRUCTIONS
CARDIAC CATHETERIZATION DISCHARGE INSTRUCTIONS     FOR SUDDEN AND SEVERE CHEST PAIN, SHORTNESS OF BREATH, EXCESSIVE BLEEDING, SIGNS OF STROKE, OR CHANGES IN MENTAL STATUS YOU SHOULD CALL 911 IMMEDIATELY.     If your provider has prescribed aspirin and/or clopidogrel (Plavix), or prasugrel (Effient), or ticagrelor (Brilinta), DO NOT STOP THESE MEDICATIONS for any reason without talking to your cardiologist first. If any of these were prescribed, you must take them every day without missing a single dose. If you are getting low on these medications, contact your provider immediately for a refill.     Resume clopidogrel 75mg daily on Tuesday, June 25th    FOR NEXT 24 HOURS  - Upon discharge, you should return home and rest for the remainder of the day and evening. You do not have to stay on bed rest but should not be very active.  It is recommended a responsible adult be with you for the first 24 hours after the procedure.    - No driving for 24 hours after procedure. Please arrange for someone to drive you home from the hospital today.     - Do not drive, operate machinery, or use power tools for 24 hours after your procedure.     - Do not make any legal decisions for 24 hours after your procedure.     - Do not drink alcoholic beverages for 24 hours after your procedure.    WOUND CARE   *FOR FEMORAL (LEG) ACCESS*  ·      Avoid heavy lifting (over 10 pounds) for 7 days, squatting or excessive bending for 2 days, and strenuous exercise for 7 days.  ·      No submerged bathing, swimming, or hot tubs for the next 7 days, or until fully healed.  ·      Avoid sexual activity for 3-4 days until any groin discomfort has ceased.     *FOR RADIAL (WRIST) ACCESS*  ·      No lifting more than 5 pounds or excessive use of the wrist for 24 hours - for example, treat your wrist as if it is sprained.  ·      Do not engage in vigorous activities (tennis, golf, bowling, weights) for at least 48 hours after the procedure.  ·       Do not submerge the wrist for 7 days after the procedure.  ·      You should expect mild tingling in your hand and tenderness at the puncture site for up to 3 days.    - The transparent dressing should be removed from the site 24 hours after the procedure.  Wash the site gently with soap and water. Rinse well and pat dry. Keep the area clean and dry. You may apply a Band-Aid to the site. Avoid lotions, ointments, or powders until fully healed.     - You may shower the day after your procedure.      - It is normal to notice a small bruise around the puncture site and/or a small grape sized or smaller lump. Any large bruising or large lump warrants a call to the office.     - If bleeding should occur, lay down and apply pressure to the affected area for 10 minutes.  If the bleeding stops notify your physician.  If there is a large amount of bleeding or spurting of blood CALL 911 immediately.  DO NOT drive yourself to the hospital.    - You may experience some tenderness, bruising or minimal inflammation.  If you have any concerns, you may contact the Cath Lab or if any of these symptoms become excessive, contact your cardiologist or go to the emergency room.     OTHER INSTRUCTIONS  - You may take acetaminophen (Tylenol) as directed for discomfort.  If pain is not relieved with acetaminophen (Tylenol), contact your doctor.    - If you notice or experience any of the following, you should notify your doctor or seek medical attention  Chest pain or discomfort  Change in mental status or weakness in extremities.  Dizziness, light headedness, or feeling faint.  Change in the site where the procedure was performed, such as bleeding or an increased area of bruising or swelling.  Tingling, numbness, pain, or coolness in the leg/arm beyond the site where the procedure was performed.  Signs of infection (i.e. shaking chills, temperature > 100 degrees Fahrenheit, warmth, redness) in the leg/arm area where the procedure was  performed.  Changes in urination   Bloody or black stools  Vomiting blood  Severe nose bleeds  Any excessive bleeding    - If you DO NOT have an appointment with your cardiologist within 2-4 weeks following your procedure, please contact their office.      Lifestyle Recommendations for a Healthier Life:    The following lifestyle changes can have a significant positive impact on your overall health - helping you to achieve healthy weight, lower metabolic and heart disease risk and manage stress more effectively:      1. Eat whole, fresh foods. Food is one of the biggest drivers of our overall health.  Make your meals whole foods based, focusing on a wide variety of non starchy vegetables and fruits.  It also beneficial to include various nuts, seeds and high-quality animal proteins for overall balanced diet.    2. Remove the sweeteners from your diet.  Artificial sweeteners have a negative impact on our metabolic health - they can cause increase in both glucose and insulin, increasing the risk or potential for insulin resistance and abnormal blood sugar levels.  Artificial sweeteners are also excessively sweeter than regular sugar, they trick our taste buds into craving extreme forms of sweet, like an addiction.  I encourage you to avoid sugar, but also stevia, aspartame, sucralose, sugar alcohols like xylitol and maltitol.    3. Get rid of the inflammatory factors in your diet - this mainly comes from sugars of all kinds and refined vegetable oils.  These can increase our risk for changes in our metabolic health which can lead to diabetes, heart disease and other chronic disease.  You can reverse or combat the effective of inflammatory foods by increasing your intake of anti-inflammatory foods like wild-caught fish, fresh ground flax seed, fish oil and variety of fruits & vegetables.      4. Eat plenty of fiber!!  The standard American diet has very low levels of daily dietary fiber, many people barely get 15 grams  per day.  There is plenty of nutrition research that shows how high-fiber foods can help lower our blood sugar.   Eat a wide variety of fiber-rich plant-based foods including nuts, seeds, fruits, vegetables, and legumes.    5. Get enough sleep. Studies from experts in endocrinology & metabolism have shown that even a few nights of poor sleep can increase the risk of insulin resistance and abnormal blood sugar values. Make sleep a priority - it is an important factor in your health.  Develop a sleep routine including: avoid eating two-three hours before bed, practice relaxation techniques (warm bath, meditation, yoga, mindfulness) to help relax your muscles and prepare you for sleep.    Go to bed and wake up at consistent times.  Avoid screen time for at least 60-90 minutes before bedtime.    6. Make exercise part of your regular routine.  Exercise helps us manage blood sugar more effectively and makes our cells more receptive to the effect of the insulin our body makes (lowers blood sugar).  Regular aerobic exercise AND interval or strength training are ideal to help maintain a healthy weight, metabolism & lower the risk of chronic disease.      7. Control stress levels. Chronic stress can lead to elevated blood sugar, elevated blood pressure, accumulation of fat around the belly and these things increase the risk for metabolic syndrome, diabetes and heart disease.  We all have various levels of stress in our lives, we can't control all of it, but we can control how we respond to it and manage it's impact.  Find healthy outlets for stress management like meditation, yoga, deep breathing, or exercise.    Home BP monitoring instructions:   Goal < 130 / 80   Remain still, Avoid smoking, caffeinated beverages, or exercise within 30 min before BP measurements.  Ensure 5 min of quiet rest before BP measurements.  Sit correctly with back straight and supported (on a straight-backed dining chair, for example, rather than a  sofa).  Sit with feet flat on the floor and legs uncrossed.  Keep arm supported on a flat surface (such as a table), with the upper arm at heart level.  Bottom of the cuff should be placed directly above the bend of the elbow  Take a reading in the AM before breakfast 2-3 times / week   Record all readings accurately:  A written log should be brought to all appointments   Monitors with built-in memory should be brought to all clinic appointments and calibrated to our machines      Weight Management:  Since food equals calories, in order to lose weight you must either eat fewer calories, exercise more to burn off calories with activity, or both. Food that is not used to fuel the body is stored as fat.    A major component of losing weight is to make smarter food choices. Here's how:    Limit non-nutritious foods, such as:  Sugar, honey, syrups and candy  Pastries, donuts, pies, cakes and cookies  Soft drinks, sweetened juices and alcoholic beverages    Cut down on high-fat foods by:  Choosing poultry, fish or lean red meat  Choosing low-fat cooking methods, such as baking, broiling, steaming, grilling and boiling  Using low-fat or non-fat dairy products  Using vinaigrette, herbs, lemon or fat-free salad dressings  Avoiding fatty meats, such as aguirre, sausage, navarrete, ribs and luncheon meats  Avoiding high-fat snacks like nuts, chips and chocolate  Avoiding fried foods  Using less butter, margarine, oil and mayonnaise  Avoiding high-fat gravies, cream sauces and cream-based soups    Eat a variety of foods, including:  Fruit and vegetables that are raw, steamed or baked  Whole grains, breads, cereal, rice and pasta  Dairy products, such as low-fat or non-fat milk or yogurt, low-fat cottage cheese and low-fat cheese  Protein-rich foods like chicken, turkey, fish, lean meat and legumes, or beans    Change your eating habits:  Eat three balanced meals a day to help control your hunger  Watch portion sizes and eat small  servings of a variety of foods  Choose low-calorie snacks  Eat only when you are hungry and stop when you are satisfied  Eat slowly and try not to perform other tasks while eating  Find other activities to distract you from food, such as walking, taking up a hobby or being involved in the community  Include regular exercise in your daily routine  Find a support group, if necessary, for emotional support in your weight loss effort    Patient Education: Smoking Cessation  Making the commitment to quit smoking is one of the best choices that smokers can make for themselves, but also a difficult one. There are various opportunities for support available. Here is an overview of them.  There are various forms of nicotine replacement therapy available to assist with minimizing these symptoms. They are nicotine gum, nicotine patch, nicotine nasal spray, nicotine inhaler, and nicotine lozenge.  Which kind of nicotine replacement therapy will best work for you can be discussed with your doctor or nurse to help you understand the pros and cons of each.  Non-nicotine replacement therapy is also available. Bupropion (Wellbutrin, Zyban) is a mild anti-depressant that is also effective in helping people to quit smoking. It can be used alone or with nicotine replacement therapy.   Varenicline (Chantix) is another medication that helps people who what to quit. This medication is not a form of nicotine replacement therapy, but it helps with the withdrawal symptoms.  Studies have shown that the best success rates for people trying to quit include counseling and/or support groups such as the National Hel[employer]ine that is a free, confidential, 24/7, 365-day-a-year treatment referral and information service:  3-010-278-HELP (0991)    Some helpful hints include:  Avoidance. Stay away from smokers and places where you are tempted to smoke.  Activities. Exercise or do hobbies that keep your hands busy.  Alternatives. Use oral substitutes such  as sugarless gum, hard candy, and carrot sticks.  Change of habits. For example, if you usually smoke during a coffee break, then go for a walk instead.  Deep breathing. When you have the urge to smoke, do a deep breathing exercise and remind yourself why you quit.  Delay tactic. If you feel that you are about to light up, delay. Tell yourself you must wait 10 minutes. Often this simple trick will allow you to move beyond the urge  Discussion. Call a friend for support.  Drink of water. Use as an oral substitute such as water.  Many people say the reason they smoke is to deal with stress. Unfortunately, stress is a part of all our lives. When quitting, you will need to find new strategies to deal with stress. There are stress-management classes, and self-help books that can help you discover new ways to reduce and deal with stress.  The bottom line is: don't just try to go it alone-reach out for support to help you achieve your goal.

## 2024-06-24 NOTE — NURSING NOTE
At 13:57 we contacted Dr. Rocha due to patient not responding to nurse at bedside. We had given .4mg of Narcan and .2mg of Romazicon to reverse the Versed or Fentanyl that received during the procedure. Did inform Dr. Rocha of the allergy to Diazepam and he stated that benefits outweighs the risk. Informed him that the allergy was seizure. Administered medication. Dr. Rocha at bedside and wanted to communicate with ER doctor. Dr. Espinoza recommended calling Stroke Alert. Stroke alert was paged over head. Patient had Blood sugar taken and was 75. Patient then taken to CT at 1400 for CT BAT without contrast patients actual weight was 61 kg. Patient had CT performed and was brought back to Cath Lab. NIHSS was 9 communicated to Dr. Rocha and patient remained pale and  not following commands as this time. Family present at bedside. See neuro stroke flowsheet for assessment and vital signs.   1412 called and informed Dr. Rocha to contact  neuro transfer center for patient report.   1425 Dr. Rocha on the phone with Dr. Cox stroke neurology doctor.   1437 D50 .5amp given

## 2024-06-24 NOTE — Clinical Note
Angioplasty of the right coronary artery lesion. Inflation 1: Pressure = 12 tone; Duration = 15 sec. Inflation 2: Pressure = 15 tone; Duration = 15 sec.

## 2024-06-24 NOTE — Clinical Note
Vessel: RCA. Stent inserted. Inflation 1: Pressure = 15 tone; Duration = 30 sec. Removed deployed post

## 2024-06-24 NOTE — H&P
History Of Present Illness  Tierney Madrid is a 84 y.o. female with a past medical history of coronary artery disease with status post stents, supraventricular tachyarrhythmia, hypertension, hyperlipidemia, restless leg syndrome presented to the Cath Lab today for [facility] due to worsening dyspnea on exertion and substernal chest discomfort.  Patient ended up having cath with s/p successful PCI to distal and proximal LAD using 2 stents and 1 to ostial/proximal RCA using RONALD.  During procedure patient's blood pressure systolic was running high in 240, got dose of hydralazine, Norvasc 5 mg, Plavix 300 mg and lisinopril 20 mg.  During the procedure patient got fentanyl 125 mcg and Versed 2.5 mg for sedation.  However patient was able awake partially during the procedure.  Postprocedure patient was confused, was given 4 Narcan and 2 Romazicon.  Postprocedure patient also had a blood sugar of 75, was given 1 ampoule of D50.  Follow-up blood sugar 150.  She was also complaining of some chest pain, was given nitroglycerin sublingual, which resulted in her blood pressure dropping into 120.  At this point patient became more confused, stroke alert was called.  Initial CT of head was negative for acute process.  Neurology was consulted.  Patient was deemed not a candidate for endovascular treatment, NIH score of 9.  Neurology recommended MRI with stroke protocol, MRA head and neck.  Continue on dual platelet and diet therapy.  Patient currently alert and awake and responds to her name, does not follow any further commands.  Current blood pressure was 208/19.  O2 sats 98% on room air.  Pulse 95.  Respirations 15.    Past Medical History  Past Medical History:   Diagnosis Date    Acute myocardial infarction, unspecified (Multi)     Acute myocardial infarction    Tinea unguium 09/12/2022    Onychomycosis of toenail       Surgical History  Past Surgical History:   Procedure Laterality Date    OTHER SURGICAL HISTORY  11/15/2019    Hip  replacement    OTHER SURGICAL HISTORY  11/15/2019    Lumbar laminectomy    OTHER SURGICAL HISTORY  11/15/2019    Colonoscopy    OTHER SURGICAL HISTORY  12/01/2022    Cardiac catheterization with stent placement        Social History  She reports that she has never smoked. She has never used smokeless tobacco. She reports that she does not currently use alcohol. She reports that she does not use drugs.    Family History  Family History   Problem Relation Name Age of Onset    Heart disease Mother      Hypertension Mother      Lung cancer Mother      Clotting disorder Father      Heart disease Father      Stroke Father      Heart failure Father      Hypertension Father          Allergies  Diazepam, Gabapentin, Promethazine, Zolpidem, and Doxycycline    Review of Systems  Constitutional: Positive for significant confusion.  Negative for fever, chills, anorexia, weight loss, malaise   ENMT: Negative for nasal discharge, congestion, ear pain, mouth pain, throat pain  Respiratory: Negative for cough, hemoptysis, wheezing, shortness of breath   Cardiac: Negative for chest pain, dyspnea on exertion, orthopnea, palpitations, syncope   Gastrointestinal: Negative for nausea, vomiting, diarrhea, constipation, abdominal pain,   Genitourinary: Negative for discharge, dysuria, flank pain, frequency, hematuria   Musculoskeletal: Negative for decreased ROM, pain, swelling, weakness   Neurological: Negative for dizziness, confusion, headache, seizures, syncope   Psychiatric: Negative for mood changes, anxiety, hallucinations, sleep changes, suicidal ideas   Skin: Negative for mass, pain, itching, rash, ulcer    Physical Exam   Gen: NAD, confused, Well developed  HEENT: Normal size, shape; EOMI intact. Sclera normal.Ears normal.Oropharynx pink and moist.  Neck: Supple.no masses noted in neck, no lymphadenopathy, no tracheal deviation, non-palpable thyroid.No neck rigidity.  Chest: chest symmetry with respirations, no wheezes,  "crackles  CVS: RRR, no rubs  Abd: soft, NT/ND, +BS  Ext: no Clubbing/Cyanosis/edema, non-tender.Pulse 2+. Homans Negative.  Skin: Dry, warm, good condition  Neuro: grossly normal  Psy: Confused and calm.  Last Recorded Vitals  Blood pressure (!) 208/90, pulse 95, temperature 37 °C (98.6 °F), temperature source Tympanic, resp. rate 15, height 1.626 m (5' 4\"), weight 58.1 kg (128 lb 1.4 oz), SpO2 98%.      Assessment/Plan   Principal Problem:    TIA (transient ischemic attack)  Active Problems:    Anemia    CAD (coronary artery disease)    Restless leg syndrome    Delirium    Hypertensive urgency    Other chest pain      Plan    Admit to the hospital, inpatient status.  Stroke protocol initiated.  Neurochecks, telemetry, oxygen check.  Fall precautions.  Speech, PT and OT.  She had neurology input, MRI brain, MRA head and neck ordered.  Continue aspirin, statin.  On aspirin, statin Plavix, statin, beta-blocker postprocedure.  Cardiology consulted.  Resume home medications for blood pressure control.  IV hydralazine and labetalol started for blood pressure management according to the protocol.  Resume home medications.  Labs in AM.    DVT prophylax on Lovenox.  CODE STATUS full.    Disposition in 2 to 3 days.    I spent 40 minutes in the professional and overall care of this patient.      Soraya Casarez MD    "

## 2024-06-24 NOTE — NURSING NOTE
Patient bedside stroke swallow study completed. Patient alert and able to sit up in bed and cough. Attempted nectar thickened with crushed meds, patient coughing multiple times with drooling out the side of the mouth and not complete swallow of thickened water. Patient failed. Physician notified.

## 2024-06-24 NOTE — Clinical Note
Vessel: LAD. Stent inserted. Inflation 1: Pressure = 12 tone; Duration = 30 sec. Inflation 2: Pressure = 12 tone; Duration = 30 sec.

## 2024-06-24 NOTE — Clinical Note
Angioplasty of the left anterior descending lesion. Inflation 1: Pressure = 12 tone; Duration = 15 sec. Inflation 2: Pressure = 15 tone; Duration = 15 sec.

## 2024-06-24 NOTE — CONSULTS
Inpatient consult to Cardiology  Consult performed by: Ishaan Rocha MD  Consult ordered by: Soraya JOHN MD  Reason for consult: TIA      History Of Present Illness:    Mrs. Tierney Madrid is a 84 y.o. non-smoker female being consulted by the Cardiology team for TIA post S/P successful PCI to LAD (2 RONALD) and RCA (1 RONALD) for in-stent restenosis. Patient with prior medical history significant for hypertension, hyperlipidemia, coronary artery disease (stents to the RCA 12/2016, mid LAD 11/2017, RCA October 2018 in September 2019), supraventricular tachyarrhythmia. Patient was complaining of worsening dyspnea on exertion and substernal chest discomfort with radiation to should and neck. She underwent an uneventful procedure S/P PCI to LAD and RCA. Her BP was extremely high during the procedure, with systolic BP ~240mmHg, that went down to 160mmHg after sedation, amlodipine, nitroglycerin and IV hydralazine. She was loaded with Lisinopril 20mg daily after the procedure, and also with Plavix 300mg and ASA. Patient was somnolent after the procedure, we initially thought it was because of the residual sedation. However, after 3 hours after the procedure, she was more somnolent. Was given IV Naloxone and Flumazenil. Afterward that, she developed difficulty speaking and became very agitated. We called the stroke team. Her NIH was initially 9. CT brain did not show signs of bleeding. Stroke call suggested to keep medical therapy at that point. Patient had some improvement in her symptoms, able to speak and more conscious, and was admitted for clinical compensation.      Last Recorded Vitals:  Vitals:    06/24/24 1357 06/24/24 1415 06/24/24 1430 06/24/24 1500   BP: 130/56 (!) 170/104 (!) 220/96 (!) 208/90   BP Location:       Patient Position:       Pulse: 104 90 93 95   Resp: 16 16 16 15   Temp:       TempSrc:       SpO2: 96% 95% 98% 98%   Weight:       Height:           Last Labs:  CBC - 6/24/2024:  2:28  "PM  9.8 12.9 282    40.3      CMP - 6/24/2024:  2:28 PM  9.2 7.6 23 --- 0.5   _ 4.2 8 104      PTT - No results in last year.  1.0   11.9 _     Troponin I, High Sensitivity   Date/Time Value Ref Range Status   05/20/2024 04:24 PM 4 0 - 13 ng/L Final   02/13/2024 10:15 AM 4 0 - 13 ng/L Final   02/13/2024 09:23 AM 4 0 - 13 ng/L Final     BNP   Date/Time Value Ref Range Status   05/20/2024 04:24 PM 37 0 - 99 pg/mL Final      Last I/O:  No intake/output data recorded.    Past Cardiology Tests (Last 3 Years):  EKG:  ECG 12 lead STAT 06/24/2024      ECG 12 lead 05/20/2024      ECG 12 lead 02/13/2024      ECG 12 lead 02/13/2024    Echo:  No results found for this or any previous visit from the past 1095 days.    Ejection Fractions:  No results found for: \"EF\"  Cath:  Cardiac Catheterization Procedure 06/24/2024    Stress Test:  No results found for this or any previous visit from the past 1095 days.    Cardiac Imaging:  No results found for this or any previous visit from the past 1095 days.      Past Medical History:  She has a past medical history of Acute myocardial infarction, unspecified (Multi) and Tinea unguium (09/12/2022).    Past Surgical History:  She has a past surgical history that includes Other surgical history (11/15/2019); Other surgical history (11/15/2019); Other surgical history (11/15/2019); and Other surgical history (12/01/2022).      Social History:  She reports that she has never smoked. She has never used smokeless tobacco. She reports that she does not currently use alcohol. She reports that she does not use drugs.    Family History:  Family History   Problem Relation Name Age of Onset    Heart disease Mother      Hypertension Mother      Lung cancer Mother      Clotting disorder Father      Heart disease Father      Stroke Father      Heart failure Father      Hypertension Father          Allergies:  Diazepam, Gabapentin, Promethazine, Zolpidem, and Doxycycline    Inpatient " Medications:  Scheduled medications   Medication Dose Route Frequency    amLODIPine  10 mg oral Daily    aspirin  81 mg oral Daily    atorvastatin  40 mg oral Nightly    carvedilol  12.5 mg oral BID    clopidogrel  75 mg oral Daily    dextrose  12.5 g intravenous Once    enoxaparin  40 mg subcutaneous q24h    fluticasone  1 spray Each Nostril BID    furosemide  20 mg oral Daily    isosorbide mononitrate ER  60 mg oral Daily    lisinopril  20 mg oral Once    lisinopril  20 mg oral Daily    pantoprazole  40 mg oral Daily    [START ON 6/25/2024] pantoprazole  40 mg oral Daily before breakfast    Or    [START ON 6/25/2024] pantoprazole  40 mg intravenous Daily before breakfast    pramipexole  1 mg oral Daily    sennosides-docusate sodium  2 tablet oral BID    sertraline  25 mg oral Daily     PRN medications   Medication    acetaminophen    acetaminophen    albuterol    hydrALAZINE    Followed by    [START ON 6/26/2024] hydrALAZINE    labetaloL    naloxone    nitroglycerin    nitroglycerin    oxygen    polyethylene glycol     Continuous Medications   Medication Dose Last Rate    sodium chloride 0.9%  1 mL/kg/hr       Outpatient Medications:  Current Outpatient Medications   Medication Instructions    acetaminophen (TYLENOL) 1,000 mg, oral, Every 8 hours PRN    albuterol 90 mcg/actuation inhaler 2 puffs, inhalation, Every 6 hours PRN    amLODIPine (NORVASC) 10 mg, oral, Daily    aspirin 81 mg, oral, Daily    atorvastatin (LIPITOR) 40 mg, oral, Daily    carvedilol (COREG) 12.5 mg, oral, 2 times daily (morning and late afternoon), other    cetirizine (ZYRTEC) 5 mg, oral, Daily    clopidogrel (PLAVIX) 75 mg, oral, Daily    fluticasone (Flonase) 50 mcg/actuation nasal spray 1 spray, Each Nostril, 2 times daily, Instill 1 squirt    furosemide (LASIX) 20 mg, oral, Daily    isosorbide mononitrate ER (IMDUR) 60 mg, oral, Daily    lisinopril 20 mg, oral, Daily    naloxone (NARCAN) 4 mg, nasal, As needed    nitroglycerin  (NITROSTAT) 0.4 mg, sublingual, Every 5 min PRN, For up to 3 doses as needed for chest pain. Call 911 if pain persists    oxyCODONE (ROXICODONE) 10 mg, oral, 3 times daily    pantoprazole (PROTONIX) 40 mg, oral, Daily    pramipexole (MIRAPEX) 0.5 mg, oral, 2 times daily    sertraline (ZOLOFT) 25 mg, oral, Daily       Physical Exam:  General: alert  HEENT: NC/AT; EOMI; PERRLA, external ear is normal  Neck: supple; trachea midline; no masses; no JVD  Chest: clear breath sounds bilaterally; no wheezing  Cardio: regular rhythm, S1S2 normal, no murmurs  Abdomen: Soft, non-tender, non-distension, no organomegaly  Extremities: no clubbing/cyanosis/edema. Good healing R femoral access, clean dressing     Assessment/Plan   Mrs. Tierney Madrid is a 84 y.o. non-smoker female being consulted by the Cardiology team for TIA post S/P successful PCI to LAD (2 RONALD) and RCA (1 RONALD) for in-stent restenosis. Patient with prior medical history significant for hypertension, hyperlipidemia, coronary artery disease (stents to the RCA 12/2016, mid LAD 11/2017, RCA October 2018 in September 2019), supraventricular tachyarrhythmia. Patient was complaining of worsening dyspnea on exertion and substernal chest discomfort with radiation to should and neck. She underwent an uneventful procedure S/P PCI to LAD and RCA. Her BP was extremely high during the procedure, with systolic BP ~240mmHg, that went down to 160mmHg after sedation, amlodipine, nitroglycerin and IV hydralazine. She was loaded with Lisinopril 20mg daily after the procedure, and also with Plavix 300mg and ASA. Patient was somnolent after the procedure, we initially thought it was because of the residual sedation. However, after 3 hours after the procedure, she was more somnolent. Was given IV Naloxone and Flumazenil. Afterward that, she developed difficulty speaking and became very agitated. We called the stroke team. Her NIH was initially 9. CT brain did not show signs of bleeding.  Stroke call suggested to keep medical therapy at that point. Patient had some improvement in her symptoms, able to speak and more conscious, and was admitted for clinical compensation.    Assessment    # Atherosclerosis / CAD S/P PCI to LAD and RCA (06/24/2024) / Prior stents to the RCA 12/2016, mid LAD 11/2017, RCA October 2018 in September 2019  - Patient with known atherosclerotic disease, implying significant increased risk for major adverse cardiac events.  - Left Heart Catheterization (06/24/2024):  Multivessel coronary artery disease  Prox LAD 70% in-stent restenosis; distal LAD 90% in-stent restenosis  Mid LCx 50% lesion  Patent stent to 1st Dg  Ostial / prox RCA 80% in-stent restenosis  Preserved LV systolic function  S/P Successful PCI to distal and proximal LAD using 2 drug-eluting stents (RONALD), guided by intravascular ultrasound (IVUS)  S/P Successful PCI to ostial/prox RCA using one RONALD, guided by IVUS  - Would suggest to keep ASA 81mg daily, Clopidogrel 75mg daily, Atorvastatin 40mg daily, Carvedilol 12.5mg BID.  - Follow up in Cardiology clinic    # TIA / Hypertensive Urgency  - Follow Neurology recs.  - Keep ASA, Clopidogrel, High intensity statin.   - Neurology check every hour, then every 2 hours  - Would suggest MRI brain.    # Hypertension  - Elevated blood pressure. Would suggest to avoid hypotension after the procedure.  - Would suggest to keep current medications including Amlodipine 10mg daily, Lisinopril 20mg daily, Carvedilol 12.5mg BID.  - Patient counseled to keep a healthy lifestyle including regular exercise and low-sodium diet.  - Recommended home blood pressure monitoring.  - Goal of BP < 130/80mmHg.     # Hyperlipidemia  - Keep home medication with Atorvastatin 40mg daily.  - Counseled on healthy diet and regular exercise.      Thank you for allowing me to participate in the care of this patient. Please reach me out if you have any questions or if you need any clarifications  regarding the patient's care.     Code Status:  Full Code    Ishaan Rocha MD  Cardiology

## 2024-06-24 NOTE — NURSING NOTE
Pt noted to have change in mental status.  Would respond to this nurse as well as , but would have pauses in responsiveness and seconds of staring. Dr Rocha notified and responded to bedside.  Rapid Response called. See MAR for meds given.

## 2024-06-24 NOTE — POST-PROCEDURE NOTE
Physician Transition of Care Summary  Invasive Cardiovascular Lab    Procedure Date: 6/24/2024  Attending:    * Ishaan Rocha - Primary  Resident/Fellow/Other Assistant: Surgeons and Role:  * No surgeons found with a matching role *    Indications:   Pre-op Diagnosis     * Other chest pain [R07.89]    Post-procedure diagnosis:   Post-op Diagnosis     * Other chest pain [R07.89]    Procedure(s):   Left Heart Cath  33033 - DC CATH PLMT L HRT & ARTS W/NJX & ANGIO IMG S&I    PCI  09566 - DC PRQ TRLUML CORONARY ANGIOPLASTY ONE ART/BRANCH    IVUS - Coronary  18902 - DC ENDOLUMINAL CORONARY IVUS OCT I&R INITIAL VESSEL    Procedure Findings:   Multivessel coronary artery disease  Prox LAD 70% in-stent restenosis; distal LAD 90% in-stent restenosis  Mid LCx 50% lesion  Patent stent to 1st Dg  Ostial / prox RCA 80% in-stent restenosis  Preserved LV systolic function  S/P Successful PCI to distal and proximal LAD using 2 drug-eluting stents (RONALD), guided by intravascular ultrasound (IVUS)  S/P Successful PCI to ostial/prox RCA using one RONALD, guided by IVUS    Description of the Procedure:   Procedure: Left Heart Catheterization    Initially we tried to go through R radial access approach, but microcatheter wire did not go through the brachial artery, so we switched to R femoral access.    R common femoral artery access with micropuncture and 5F sheath inserted above the bifurcation under ultrasound guidance. LV and Ao hemodynamic measurements. L and R coronaries engaged using 5F JL-4 and JR-3.5 catheters respectively. S/P Left Heart Catheterization that showed multivessel obstructive coronary artery disease.   Prox LAD 70% in-stent restenosis; distal LAD 90% in-stent restenosis  Mid LCx 50% lesion  Patent stents to 1st Dg  Ostial / prox RCA 80% in-stent restenosis  Preserved LV systolic function    Catheter switched to 5F XB 3 and 5F JR for catheterization of LM and RCA respectively. Heparinization 100ui/Kg. ACT >  250.     S/P Successful PCI to distal and proximal LAD using 2 drug-eluting stents (LEÓN), guided by intravascular ultrasound (IVUS).    S/P Successful PCI to ostial/prox RCA using one LEÓN, guided by IVUS.    Patient remained stable during the entire procedure. No complications. Hemostasis with manual pressure.     Plan:    Patient loaded with ASA 325mg and Plavix 300mg. Should be discharged home keeping ASA 81mg daily and Plavix 75mg daily.   Compared to the pre-procedural EKG, post-procedural EKG with no new abnormalities and no signs of acute coronary syndrome.   Keep hydration 100mL/h for at least 3 hours, ideally 6 hours.   Patient may be discharged home after bed rest for 3 hours. Constant check for access site bleeding. After the patient resumes walking, observe for 15 minutes and check for signs of bleeding. We have provided detailed recommendations for the adequate care of the access site, especially with instructions for exercise and in case of bleeding. Patient counseled to keep clean dressing and avoid bathing for the next 24 hours. Avoid extenuating exercises for 7 days. Avoid bath tube and swimming pool for 7 days. Patient advised to go to the Emergency Department in case of bleeding.     Would suggest to keep GDMT for the remaining vessels.    Complications:   No    Stents/Implants:   Implants       Stent    Stent, [place], 2.00 X 22rx - [id number] - Implanted        Inventory item: STENT, [place] FRONTIER LEÓN, 2.00 X 22RX Model/Cat number: [id number]    : [employer] Lot number: [id number]      As of 6/24/2024       Status: Implanted                      Stent, [place], 2.75 X 12rx - [id number] - Implanted        Inventory item: STENT, [place] FRONTIER LEÓN, 2.75 X 12RX Model/Cat number: [id number]    : [employer] Lot number: [id number]    Device identifier: [id number]        As of 6/24/2024       Status: Implanted                      Stent, [place], 2.50 X 15rx - [id number] - Implanted        Inventory item: STENT, LAYTON FRONTIER LEÓN, 2.50 X 15RX Model/Cat number: [id number]    : [employer] Lot number: [id number]      As of 6/24/2024       Status: Implanted                              Anticoagulation/Antiplatelet Plan:   Should be discharged home keeping ASA 81mg daily and Plavix 75mg daily.     Estimated Blood Loss:   5 mL    Anesthesia: Moderate Sedation Anesthesia Staff: No anesthesia staff entered.    Any Specimen(s) Removed:   No specimens collected during this procedure.    Disposition:   Home    Electronically signed by: Ishaan Rocha MD, 6/24/2024 10:08 AM

## 2024-06-24 NOTE — Clinical Note
Vessel: LAD. IVUS catheter inserted and images obtained. W Plasty Text: The lesion was extirpated to the level of the fat with a #15 scalpel blade.  Given the location of the defect, shape of the defect and the proximity to free margins a W-plasty was deemed most appropriate for repair.  Using a sterile surgical marker, the appropriate transposition arms of the W-plasty were drawn incorporating the defect and placing the expected incisions within the relaxed skin tension lines where possible.    The area thus outlined was incised deep to adipose tissue with a #15 scalpel blade.  The skin margins were undermined to an appropriate distance in all directions utilizing iris scissors.  The opposing transposition arms were then transposed into place in opposite direction and anchored with interrupted buried subcutaneous sutures.

## 2024-06-24 NOTE — Clinical Note
Angioplasty of the left anterior descending lesion. Inflation 1: Pressure = 12 tone; Duration = 12 sec. Inflation 2: Pressure = 12 tone; Duration = 15 sec.

## 2024-06-24 NOTE — Clinical Note
Angioplasty of the proximal left anterior descending lesion. Inflation 1: Pressure = 15 tone; Duration = 15 sec. Inflation 2: Pressure = 15 tone; Duration = 20 sec. Inflation 3: Pressure = 15 tone; Duration = 20 sec. The procedure was performed independently

## 2024-06-25 ENCOUNTER — APPOINTMENT (OUTPATIENT)
Dept: RADIOLOGY | Facility: HOSPITAL | Age: 84
End: 2024-06-25
Payer: MEDICARE

## 2024-06-25 ENCOUNTER — APPOINTMENT (OUTPATIENT)
Dept: VASCULAR MEDICINE | Facility: HOSPITAL | Age: 84
End: 2024-06-25
Payer: MEDICARE

## 2024-06-25 LAB
ANION GAP SERPL CALC-SCNC: 16 MMOL/L (ref 10–20)
ATRIAL RATE: 92 BPM
BASOPHILS # BLD AUTO: 0.02 X10*3/UL (ref 0–0.1)
BASOPHILS NFR BLD AUTO: 0.3 %
BUN SERPL-MCNC: 16 MG/DL (ref 6–23)
CALCIUM SERPL-MCNC: 9.4 MG/DL (ref 8.6–10.3)
CHLORIDE SERPL-SCNC: 101 MMOL/L (ref 98–107)
CO2 SERPL-SCNC: 24 MMOL/L (ref 21–32)
CREAT SERPL-MCNC: 0.8 MG/DL (ref 0.5–1.05)
EGFRCR SERPLBLD CKD-EPI 2021: 73 ML/MIN/1.73M*2
EOSINOPHIL # BLD AUTO: 0 X10*3/UL (ref 0–0.4)
EOSINOPHIL NFR BLD AUTO: 0 %
ERYTHROCYTE [DISTWIDTH] IN BLOOD BY AUTOMATED COUNT: 12.9 % (ref 11.5–14.5)
GLUCOSE BLD MANUAL STRIP-MCNC: 107 MG/DL (ref 74–99)
GLUCOSE BLD MANUAL STRIP-MCNC: 110 MG/DL (ref 74–99)
GLUCOSE BLD MANUAL STRIP-MCNC: 124 MG/DL (ref 74–99)
GLUCOSE SERPL-MCNC: 112 MG/DL (ref 74–99)
HCT VFR BLD AUTO: 47.7 % (ref 36–46)
HGB BLD-MCNC: 15.9 G/DL (ref 12–16)
HOLD SPECIMEN: NORMAL
HOLD SPECIMEN: NORMAL
IMM GRANULOCYTES # BLD AUTO: 0.03 X10*3/UL (ref 0–0.5)
IMM GRANULOCYTES NFR BLD AUTO: 0.4 % (ref 0–0.9)
LYMPHOCYTES # BLD AUTO: 2.01 X10*3/UL (ref 0.8–3)
LYMPHOCYTES NFR BLD AUTO: 25.3 %
MAGNESIUM SERPL-MCNC: 1.89 MG/DL (ref 1.6–2.4)
MCH RBC QN AUTO: 30.4 PG (ref 26–34)
MCHC RBC AUTO-ENTMCNC: 33.3 G/DL (ref 32–36)
MCV RBC AUTO: 91 FL (ref 80–100)
MONOCYTES # BLD AUTO: 0.42 X10*3/UL (ref 0.05–0.8)
MONOCYTES NFR BLD AUTO: 5.3 %
NEUTROPHILS # BLD AUTO: 5.46 X10*3/UL (ref 1.6–5.5)
NEUTROPHILS NFR BLD AUTO: 68.7 %
NRBC BLD-RTO: 0 /100 WBCS (ref 0–0)
P AXIS: 78 DEGREES
P OFFSET: 198 MS
P ONSET: 139 MS
PHOSPHATE SERPL-MCNC: 4.5 MG/DL (ref 2.5–4.9)
PLATELET # BLD AUTO: 283 X10*3/UL (ref 150–450)
POTASSIUM SERPL-SCNC: 3.9 MMOL/L (ref 3.5–5.3)
PR INTERVAL: 148 MS
Q ONSET: 213 MS
QRS COUNT: 16 BEATS
QRS DURATION: 84 MS
QT INTERVAL: 426 MS
QTC CALCULATION(BAZETT): 526 MS
QTC FREDERICIA: 491 MS
R AXIS: -62 DEGREES
RBC # BLD AUTO: 5.23 X10*6/UL (ref 4–5.2)
SODIUM SERPL-SCNC: 137 MMOL/L (ref 136–145)
T AXIS: 39 DEGREES
T OFFSET: 426 MS
VENTRICULAR RATE: 92 BPM
WBC # BLD AUTO: 7.9 X10*3/UL (ref 4.4–11.3)

## 2024-06-25 PROCEDURE — 93880 EXTRACRANIAL BILAT STUDY: CPT

## 2024-06-25 PROCEDURE — 2500000002 HC RX 250 W HCPCS SELF ADMINISTERED DRUGS (ALT 637 FOR MEDICARE OP, ALT 636 FOR OP/ED): Performed by: HOSPITALIST

## 2024-06-25 PROCEDURE — 2500000005 HC RX 250 GENERAL PHARMACY W/O HCPCS: Performed by: HOSPITALIST

## 2024-06-25 PROCEDURE — 36415 COLL VENOUS BLD VENIPUNCTURE: CPT | Performed by: HOSPITALIST

## 2024-06-25 PROCEDURE — 99222 1ST HOSP IP/OBS MODERATE 55: CPT | Performed by: STUDENT IN AN ORGANIZED HEALTH CARE EDUCATION/TRAINING PROGRAM

## 2024-06-25 PROCEDURE — 84100 ASSAY OF PHOSPHORUS: CPT | Performed by: STUDENT IN AN ORGANIZED HEALTH CARE EDUCATION/TRAINING PROGRAM

## 2024-06-25 PROCEDURE — 94761 N-INVAS EAR/PLS OXIMETRY MLT: CPT

## 2024-06-25 PROCEDURE — 2500000004 HC RX 250 GENERAL PHARMACY W/ HCPCS (ALT 636 FOR OP/ED): Performed by: HOSPITALIST

## 2024-06-25 PROCEDURE — 71045 X-RAY EXAM CHEST 1 VIEW: CPT | Performed by: RADIOLOGY

## 2024-06-25 PROCEDURE — 36415 COLL VENOUS BLD VENIPUNCTURE: CPT | Performed by: STUDENT IN AN ORGANIZED HEALTH CARE EDUCATION/TRAINING PROGRAM

## 2024-06-25 PROCEDURE — 92610 EVALUATE SWALLOWING FUNCTION: CPT | Mod: GN

## 2024-06-25 PROCEDURE — 99233 SBSQ HOSP IP/OBS HIGH 50: CPT | Performed by: HOSPITALIST

## 2024-06-25 PROCEDURE — 82374 ASSAY BLOOD CARBON DIOXIDE: CPT | Performed by: HOSPITALIST

## 2024-06-25 PROCEDURE — 2500000004 HC RX 250 GENERAL PHARMACY W/ HCPCS (ALT 636 FOR OP/ED): Performed by: INTERNAL MEDICINE

## 2024-06-25 PROCEDURE — 83735 ASSAY OF MAGNESIUM: CPT | Performed by: STUDENT IN AN ORGANIZED HEALTH CARE EDUCATION/TRAINING PROGRAM

## 2024-06-25 PROCEDURE — C9113 INJ PANTOPRAZOLE SODIUM, VIA: HCPCS | Performed by: HOSPITALIST

## 2024-06-25 PROCEDURE — 71045 X-RAY EXAM CHEST 1 VIEW: CPT

## 2024-06-25 PROCEDURE — 1200000002 HC GENERAL ROOM WITH TELEMETRY DAILY

## 2024-06-25 PROCEDURE — 82947 ASSAY GLUCOSE BLOOD QUANT: CPT | Mod: 91

## 2024-06-25 PROCEDURE — 2500000001 HC RX 250 WO HCPCS SELF ADMINISTERED DRUGS (ALT 637 FOR MEDICARE OP): Performed by: HOSPITALIST

## 2024-06-25 PROCEDURE — 93880 EXTRACRANIAL BILAT STUDY: CPT | Performed by: SURGERY

## 2024-06-25 PROCEDURE — 85025 COMPLETE CBC W/AUTO DIFF WBC: CPT | Performed by: HOSPITALIST

## 2024-06-25 RX ORDER — FUROSEMIDE 20 MG/1
20 TABLET ORAL DAILY
Status: DISCONTINUED | OUTPATIENT
Start: 2024-06-26 | End: 2024-06-28

## 2024-06-25 RX ORDER — MORPHINE SULFATE 2 MG/ML
2 INJECTION, SOLUTION INTRAMUSCULAR; INTRAVENOUS EVERY 4 HOURS PRN
Status: DISCONTINUED | OUTPATIENT
Start: 2024-06-25 | End: 2024-06-25

## 2024-06-25 RX ORDER — ISOSORBIDE DINITRATE 10 MG/1
10 TABLET ORAL
Status: DISCONTINUED | OUTPATIENT
Start: 2024-06-25 | End: 2024-06-28 | Stop reason: HOSPADM

## 2024-06-25 RX ORDER — ATORVASTATIN CALCIUM 40 MG/1
40 TABLET, FILM COATED ORAL NIGHTLY
Status: DISCONTINUED | OUTPATIENT
Start: 2024-06-25 | End: 2024-06-28

## 2024-06-25 RX ORDER — LIDOCAINE 560 MG/1
1 PATCH PERCUTANEOUS; TOPICAL; TRANSDERMAL DAILY
Status: DISCONTINUED | OUTPATIENT
Start: 2024-06-25 | End: 2024-06-28 | Stop reason: HOSPADM

## 2024-06-25 RX ORDER — NAPROXEN SODIUM 220 MG/1
81 TABLET, FILM COATED ORAL DAILY
Status: DISCONTINUED | OUTPATIENT
Start: 2024-06-25 | End: 2024-06-28

## 2024-06-25 RX ORDER — MORPHINE SULFATE 2 MG/ML
2 INJECTION, SOLUTION INTRAMUSCULAR; INTRAVENOUS ONCE
Status: COMPLETED | OUTPATIENT
Start: 2024-06-25 | End: 2024-06-25

## 2024-06-25 RX ORDER — PRAMIPEXOLE DIHYDROCHLORIDE 0.5 MG/1
1 TABLET ORAL DAILY
Status: DISCONTINUED | OUTPATIENT
Start: 2024-06-26 | End: 2024-06-26

## 2024-06-25 RX ORDER — CLOPIDOGREL BISULFATE 75 MG/1
75 TABLET ORAL DAILY
Status: DISCONTINUED | OUTPATIENT
Start: 2024-06-26 | End: 2024-06-28

## 2024-06-25 RX ORDER — DEXTROSE MONOHYDRATE AND SODIUM CHLORIDE 5; .9 G/100ML; G/100ML
50 INJECTION, SOLUTION INTRAVENOUS CONTINUOUS
Status: DISCONTINUED | OUTPATIENT
Start: 2024-06-25 | End: 2024-06-26

## 2024-06-25 RX ORDER — SERTRALINE HYDROCHLORIDE 50 MG/1
25 TABLET, FILM COATED ORAL DAILY
Status: DISCONTINUED | OUTPATIENT
Start: 2024-06-26 | End: 2024-06-28

## 2024-06-25 RX ORDER — NAPROXEN SODIUM 220 MG/1
81 TABLET, FILM COATED ORAL DAILY
Status: DISCONTINUED | OUTPATIENT
Start: 2024-06-25 | End: 2024-06-25

## 2024-06-25 RX ORDER — AMLODIPINE BESYLATE 10 MG/1
10 TABLET ORAL DAILY
Status: DISCONTINUED | OUTPATIENT
Start: 2024-06-26 | End: 2024-06-28

## 2024-06-25 RX ORDER — CARVEDILOL 12.5 MG/1
12.5 TABLET ORAL
Status: DISCONTINUED | OUTPATIENT
Start: 2024-06-25 | End: 2024-06-28

## 2024-06-25 RX ORDER — AMOXICILLIN 250 MG
2 CAPSULE ORAL 2 TIMES DAILY
Status: DISCONTINUED | OUTPATIENT
Start: 2024-06-25 | End: 2024-06-28

## 2024-06-25 ASSESSMENT — PAIN SCALES - PAIN ASSESSMENT IN ADVANCED DEMENTIA (PAINAD)
BODYLANGUAGE: RIGID, FISTS CLENCHED, KNEES UP, PUSHING/PULLING AWAY, STRIKES OUT
CONSOLABILITY: NO NEED TO CONSOLE
BREATHING: NORMAL
TOTALSCORE: 2
BODYLANGUAGE: RELAXED
BREATHING: NORMAL
TOTALSCORE: 0
CONSOLABILITY: NO NEED TO CONSOLE
BREATHING: NORMAL
NEGVOCALIZATION: OCCASIONAL MOAN/GROAN, LOW SPEECH, NEGATIVE/DISAPPROVING QUALITY
CONSOLABILITY: DISTRACTED OR REASSURED BY VOICE/TOUCH
FACIALEXPRESSION: SMILING OR INEXPRESSIVE
BODYLANGUAGE: RELAXED
CONSOLABILITY: DISTRACTED OR REASSURED BY VOICE/TOUCH
BREATHING: NORMAL
TOTALSCORE: 0
TOTALSCORE: MEDICATION (SEE MAR)
FACIALEXPRESSION: SAD, FRIGHTENED, FROWN
TOTALSCORE: 7
BREATHING: NORMAL
CONSOLABILITY: NO NEED TO CONSOLE
BODYLANGUAGE: RELAXED
BREATHING: NORMAL
BODYLANGUAGE: RELAXED
TOTALSCORE: 0
CONSOLABILITY: NO NEED TO CONSOLE
FACIALEXPRESSION: SMILING OR INEXPRESSIVE
FACIALEXPRESSION: FACIAL GRIMACING
BODYLANGUAGE: RIGID, FISTS CLENCHED, KNEES UP, PUSHING/PULLING AWAY, STRIKES OUT
TOTALSCORE: 2
FACIALEXPRESSION: SMILING OR INEXPRESSIVE
TOTALSCORE: 2
FACIALEXPRESSION: SMILING OR INEXPRESSIVE
FACIALEXPRESSION: SMILING OR INEXPRESSIVE
TOTALSCORE: IMPROVED
BODYLANGUAGE: RIGID, FISTS CLENCHED, KNEES UP, PUSHING/PULLING AWAY, STRIKES OUT
BREATHING: OCCASIONAL LABORED BREATHING, SHORT PERIOD OF HYPERVENTILATION
CONSOLABILITY: NO NEED TO CONSOLE

## 2024-06-25 ASSESSMENT — COGNITIVE AND FUNCTIONAL STATUS - GENERAL
TURNING FROM BACK TO SIDE WHILE IN FLAT BAD: TOTAL
MOBILITY SCORE: 6
WALKING IN HOSPITAL ROOM: TOTAL
TOILETING: TOTAL
PERSONAL GROOMING: TOTAL
DRESSING REGULAR LOWER BODY CLOTHING: TOTAL
DAILY ACTIVITIY SCORE: 6
HELP NEEDED FOR BATHING: TOTAL
MOVING FROM LYING ON BACK TO SITTING ON SIDE OF FLAT BED WITH BEDRAILS: TOTAL
MOVING TO AND FROM BED TO CHAIR: TOTAL
STANDING UP FROM CHAIR USING ARMS: TOTAL
DRESSING REGULAR UPPER BODY CLOTHING: TOTAL
CLIMB 3 TO 5 STEPS WITH RAILING: TOTAL
EATING MEALS: TOTAL

## 2024-06-25 ASSESSMENT — PAIN - FUNCTIONAL ASSESSMENT
PAIN_FUNCTIONAL_ASSESSMENT: PAINAD (PAIN ASSESSMENT IN ADVANCED DEMENTIA SCALE)

## 2024-06-25 ASSESSMENT — PAIN DESCRIPTION - LOCATION: LOCATION: SHOULDER

## 2024-06-25 ASSESSMENT — PAIN SCALES - WONG BAKER: WONGBAKER_NUMERICALRESPONSE: HURTS WORST

## 2024-06-25 ASSESSMENT — PAIN SCALES - GENERAL: PAINLEVEL_OUTOF10: 6

## 2024-06-25 ASSESSMENT — PAIN DESCRIPTION - ORIENTATION: ORIENTATION: LEFT

## 2024-06-25 NOTE — PROGRESS NOTES
06/25/24 1614   Discharge Planning   Living Arrangements Spouse/significant other   Support Systems Spouse/significant other;Children   Assistance Needed Total Care 6/25   Type of Residence Private residence   Number of Stairs to Enter Residence 3   Number of Stairs Within Residence 0   Do you have animals or pets at home? Yes   Type of Animals or Pets Dog   Who is requesting discharge planning? Provider   Home or Post Acute Services In home services;Post acute facilities (Rehab/SNF/etc)   Type of Post Acute Facility Services Skilled nursing   Type of Home Care Services Home nursing visits;Home OT;Home PT   Patient expects to be discharged to: Home w Family and HHC vs SNF     DAYSI reviewed chart and met with pt, spouse, and several visitors at the bedside.  Pt sleeping so assessment questions answered by spouse/Nadir.  All parties were educated to the DAYSI roles in the discharge planning process.  Prior to hospitalization, pt and spouse were living together in a one story home w 3 steps to enter.  Spouse reports pt is normally independent with all care/activities, excluding driving. She has a wheelchair, cane, walker, and hospital bed at home, but no other services.  She is active with PCP, Dr. Leggett, last seen 1-2 weeks ago and uses [facility] for prescriptions. Spouse denies any issues obtaining/affording medications.  Demographics verified and confirmed.  Spouse is hopeful pt can return home at discharge; however, will depend on therapy evaluations and level of assistance. Care Transitions will continue to follow.     VANI Oneill

## 2024-06-25 NOTE — SIGNIFICANT EVENT
Cardiology    Patient back from the MRI / MRA with some improvement in her symptoms, however still with L hemiplegia and difficulty for speaking. Her NIH score went up from 9 to 23.    Case was once again discussed with the Neurology team.    Per Neurology team, her diffusion is negative for stroke. She indeed has some intracranial arrow but no LVO. Intracranial stenosis of small branches, not unexpected for her age and underlying vascular disease.  Encephalopathy and Seizures are differential diagnosis at this point. If she has persistent gaze deviation, a seizure would be in the differential.   Therefore, her symptoms at this point are unexpected with her normal imaging. If seizure is in the differential, she should be loaded with keppra and order an EEG.     Ishaan Rocha MD  Cardiology

## 2024-06-25 NOTE — PROGRESS NOTES
Subjective Data:  Patient is more awake and following commands. However, she still has some slurred speech and some weakness in her L body. Hemodynamically stable.    Overnight Events:    Confusion and agitation     Objective Data:  Last Recorded Vitals:  Vitals:    06/25/24 0500 06/25/24 0600 06/25/24 0700 06/25/24 0712   BP:    155/76   BP Location:    Left arm   Patient Position:    Lying   Pulse: 89 99  104   Resp:    18   Temp:    37.1 °C (98.8 °F)   TempSrc:    Temporal   SpO2:   95% 95%   Weight:       Height:           Last Labs:  CBC - 6/25/2024:  5:33 AM  7.9 15.9 283    47.7      CMP - 6/25/2024:  5:33 AM  9.4 7.6 23 --- 0.5   _ 4.2 8 104      PTT - No results in last year.  1.0   11.9 _     TROPHS   Date/Time Value Ref Range Status   05/20/2024 04:24 PM 4 0 - 13 ng/L Final   02/13/2024 10:15 AM 4 0 - 13 ng/L Final   02/13/2024 09:23 AM 4 0 - 13 ng/L Final     BNP   Date/Time Value Ref Range Status   06/24/2024 04:15 PM 97 0 - 99 pg/mL Final   05/20/2024 04:24 PM 37 0 - 99 pg/mL Final     HGBA1C   Date/Time Value Ref Range Status   06/24/2024 04:15 PM 5.9 see below % Final     LDLCALC   Date/Time Value Ref Range Status   06/24/2024 04:15  <=99 mg/dL Final     Comment:                                 Near   Borderline      AGE      Desirable  Optimal    High     High     Very High     0-19 Y     0 - 109     ---    110-129   >/= 130     ----    20-24 Y     0 - 119     ---    120-159   >/= 160     ----      >24 Y     0 -  99   100-129  130-159   160-189     >/=190       VLDL   Date/Time Value Ref Range Status   06/24/2024 04:15 PM 28 0 - 40 mg/dL Final      Last I/O:  I/O last 3 completed shifts:  In: 184.8 (3.2 mL/kg) [I.V.:159.8 (2.8 mL/kg); IV Piggyback:25]  Out: 1755 (30.2 mL/kg) [Urine:1750 (0.8 mL/kg/hr); Blood:5]  Weight: 58.1 kg     Past Cardiology Tests (Last 3 Years):  EKG:  Electrocardiogram 12 Lead 06/24/2024      ECG 12 lead STAT 06/24/2024      ECG 12 lead 05/20/2024      ECG 12 lead  "02/13/2024      ECG 12 lead 02/13/2024    Echo:  No results found for this or any previous visit from the past 1095 days.    Ejection Fractions:  No results found for: \"EF\"  Cath:  Cardiac Catheterization Procedure 06/24/2024    Stress Test:  No results found for this or any previous visit from the past 1095 days.    Cardiac Imaging:  No results found for this or any previous visit from the past 1095 days.      Inpatient Medications:  Scheduled medications   Medication Dose Route Frequency    amLODIPine  10 mg oral Daily    aspirin  81 mg oral Daily    atorvastatin  40 mg oral Nightly    carvedilol  12.5 mg oral BID    clopidogrel  75 mg oral Daily    dextrose  12.5 g intravenous Once    enoxaparin  40 mg subcutaneous q24h    fluticasone  1 spray Each Nostril BID    furosemide  20 mg oral Daily    isosorbide mononitrate ER  60 mg oral Daily    lidocaine  1 patch transdermal Daily    lidocaine  1 patch transdermal Daily    lisinopril  20 mg oral Once    lisinopril  20 mg oral Daily    pantoprazole  40 mg oral Daily    pantoprazole  40 mg oral Daily before breakfast    Or    pantoprazole  40 mg intravenous Daily before breakfast    pramipexole  1 mg oral Daily    sennosides-docusate sodium  2 tablet oral BID    sertraline  25 mg oral Daily     PRN medications   Medication    acetaminophen    acetaminophen    hydrALAZINE    Followed by    [START ON 6/26/2024] hydrALAZINE    labetaloL    naloxone    nitroglycerin    nitroglycerin    oxygen    polyethylene glycol     Continuous Medications   Medication Dose Last Rate       Physical Exam:  General: alert, following commands, slurred speech, weakness L body  HEENT: NC/AT; EOMI; PERRLA, external ear is normal  Neck: supple; trachea midline; no masses; no JVD  Chest: clear breath sounds bilaterally; no wheezing  Cardio: regular rhythm, S1S2 normal, no murmurs  Abdomen: Soft, non-tender, non-distension, no organomegaly  Extremities: no clubbing/cyanosis/edema. Good healing R " femoral access, clean dressing     Assessment/Plan     Mrs. Tierney Madrid is a 84 y.o. non-smoker female being consulted by the Cardiology team for TIA post S/P successful PCI to LAD (2 RONALD) and RCA (1 RONALD) for in-stent restenosis. Patient with prior medical history significant for hypertension, hyperlipidemia, coronary artery disease (stents to the RCA 12/2016, mid LAD 11/2017, RCA October 2018 in September 2019), supraventricular tachyarrhythmia. Patient was complaining of worsening dyspnea on exertion and substernal chest discomfort with radiation to should and neck. She underwent an uneventful procedure S/P PCI to LAD and RCA. Her BP was extremely high during the procedure, with systolic BP ~240mmHg, that went down to 160mmHg after sedation, amlodipine, nitroglycerin and IV hydralazine. She was loaded with Lisinopril 20mg daily after the procedure, and also with Plavix 300mg and ASA. Patient was somnolent after the procedure, we initially thought it was because of the residual sedation. However, after 3 hours after the procedure, she was more somnolent. Was given IV Naloxone and Flumazenil. Afterward that, she developed difficulty speaking and became very agitated. We called the stroke team. Her NIH was initially 9. CT brain did not show signs of bleeding. Stroke call suggested to keep medical therapy at that point. Patient had some improvement in her symptoms, able to speak and more conscious, and was admitted for clinical compensation.     Assessment     # Atherosclerosis / CAD S/P PCI to LAD and RCA (06/24/2024) / Prior stents to the RCA 12/2016, mid LAD 11/2017, RCA October 2018 in September 2019  - Patient with known atherosclerotic disease, implying significant increased risk for major adverse cardiac events.  - Left Heart Catheterization (06/24/2024):  Multivessel coronary artery disease  Prox LAD 70% in-stent restenosis; distal LAD 90% in-stent restenosis  Mid LCx 50% lesion  Patent stent to 1st  Dg  Ostial / prox RCA 80% in-stent restenosis  Preserved LV systolic function  S/P Successful PCI to distal and proximal LAD using 2 drug-eluting stents (RONALD), guided by intravascular ultrasound (IVUS)  S/P Successful PCI to ostial/prox RCA using one RONALD, guided by IVUS  - Would suggest to keep ASA 81mg daily, Clopidogrel 75mg daily, Atorvastatin 40mg daily, Carvedilol 12.5mg BID.  - Follow up in Cardiology clinic     # TIA / Hypertensive Urgency / Encephalopathy / Seizures?  - Follow Neurology recs:  Patient back from the MRI / MRA with some improvement in her symptoms, however still with L hemiplegia and difficulty for speaking. Her NIH score went up from 9 to 23.   Case was once again discussed with the Neurology team.   Per Neurology team, her diffusion is negative for stroke. She indeed has some intracranial arrow but no LVO. Intracranial stenosis of small branches, not unexpected for her age and underlying vascular disease.  Encephalopathy and Seizures are differential diagnosis at this point. If she has persistent gaze deviation, a seizure would be in the differential.   Therefore, her symptoms at this point are unexpected with her normal imaging. If seizure is in the differential, she should be loaded with keppra and order an EEG.   - Keep ASA, Clopidogrel, High intensity statin.   - Neurology check.  - EEG. Discuss Keppra.     # Hypertension  - Elevated blood pressure, improved compared to yesterday. Would suggest to avoid hypotension after the procedure.  - Would suggest to keep current medications including Amlodipine 10mg daily, Lisinopril 20mg daily, Carvedilol 12.5mg BID.  - Patient counseled to keep a healthy lifestyle including regular exercise and low-sodium diet.  - Recommended home blood pressure monitoring.  - Goal of BP < 130/80mmHg.      # Hyperlipidemia  - Keep home medication with Atorvastatin 40mg daily.  - Counseled on healthy diet and regular exercise.       Thank you for allowing me to  participate in the care of this patient. Please reach me out if you have any questions or if you need any clarifications regarding the patient's care.     Peripheral IV 06/24/24 18 G Left;Upper;Ventral Arm (Active)   Site Assessment Clean;Dry;Intact 06/25/24 0617   Dressing Type Transparent 06/25/24 0617   Line Status Saline locked;Flushed 06/25/24 0617   Dressing Status Clean;Dry;Occlusive 06/25/24 0617   Number of days: 1       Code Status:  Full Code    Ishaan Rocha MD  Cardiology

## 2024-06-25 NOTE — PROGRESS NOTES
Tierney Dietz is a 84 y.o. female on day 1 of admission presenting with TIA (transient ischemic attack).      Subjective   Patient is seen bedside, more awake and alert, following simple commands.  Does take some time to follow the commands.  Not able to swallow.  Currently NPO.  Patient does complain of left chest wall pain, pain in the back of the neck.  No numbness tingling.  Per nursing confused, no agitation.  No other behaviors.    Objective     Last Recorded Vitals  /76 (BP Location: Left arm, Patient Position: Lying)   Pulse 104   Temp 37.1 °C (98.8 °F) (Temporal)   Resp 18   Wt 58.1 kg (128 lb 1.4 oz)   SpO2 95%   Intake/Output last 3 Shifts:    Intake/Output Summary (Last 24 hours) at 6/25/2024 1103  Last data filed at 6/25/2024 0617  Gross per 24 hour   Intake 184.78 ml   Output 1750 ml   Net -1565.22 ml       Admission Weight  Weight: 58.1 kg (128 lb 1.4 oz) (06/24/24 0723)    Daily Weight  06/24/24 : 58.1 kg (128 lb 1.4 oz)    Image Results  Vascular US carotid artery duplex bilateral  Preliminary Cardiology [place]                  [place] [address]  Phone [phone], Fax [phone]           Preliminary Vascular Lab Report     [facility] US CAROTID ARTERY DUPLEX BILATERAL       Patient Name:     TIERNEY DIETZ Reading Physician:  [id number] Hernandez MD  Study Date:       6/25/2024     Ordering Provider:  [id number] ELIZABET BARGER  MRN/PID:          [id number]      Fellow:  Accession#:       [id number]  Technologist:       Nadir Jacinto RVT  YOB: 1940     Technologist 2:  Gender:           F             Encounter#:         [id number]  Admission Status: Inpatient     Location Performed: [facility]       Diagnosis/ICD: Other specified symptoms and signs involving the circulatory and                 respiratory systems-R09.89  CPT Codes:     36781 Cerebrovascular Carotid Duplex scan complete       PRELIMINARY  CONCLUSIONS:     Right Carotid: Findings are consistent with 50 to 69% stenosis of the right proximal internal carotid artery. The right proximal internal carotid artery is normal. Right external carotid artery appears patent with no evidence of stenosis. No evidence of hemodynamically significant stenosis of the right common carotid artery. The right vertebral artery is patent with antegrade flow. No evidence of hemodynamically significant stenosis in the right subclavian artery.  Left Carotid: Findings are consistent with less than 50% stenosis of the left proximal internal carotid artery. The left proximal internal carotid artery is normal. Left external carotid artery appears patent with no evidence of stenosis. No evidence of hemodynamically significant stenosis of the left common carotid artery. The left vertebral artery is patent with antegrade flow. No evidence of hemodynamically significant stenosis in the left subclavian artery.     Imaging & Doppler Findings:  Right Plaque Morph: The proximal right internal carotid artery demonstrates irregular and calcified plaque. The right carotid bulb demonstrates irregular and calcified plaque.  Left Plaque Morph: The proximal left internal carotid artery demonstrates heterogenous plaque. The left carotid bulb demonstrates heterogenous plaque.      Right                        Left    PSV      EDV                PSV      EDV  82 cm/s            CCA P    81 cm/s  74 cm/s            CCA D    63 cm/s  173 cm/s 33 cm/s   ICA P    60 cm/s  18 cm/s  103 cm/s 21 cm/s   ICA D    79 cm/s  20 cm/s  143 cm/s            ECA     92 cm/s  56 cm/s  13 cm/s Vertebral  61 cm/s  14 cm/s  128 cm/s         Subclavian 154 cm/s                     Right Left  ICA/CCA Ratio  2.3  1.0          VASCULAR PRELIMINARY REPORT  completed by Nadir Jacinto RVT on 6/25/2024 at 11:00:36 AM       ** Final **  Cardiac Catheterization Procedure           [facility]     [address] [address]  Phone [phone], Fax [phone]    Cardiovascular Catheterization Report    Patient Name:      KATHIE DIETZ        Performing Physician:  Jacquie Rocha MD  Study Date:        6/24/2024            Verifying Physician:   Jacquie Rocha MD  MRN/PID:           [id number]             Cardiologist/Co-Scrub:  Accession#:        [id number]         Ordering Provider:     [id number] MONIQUE                                                                 ALAN  Date of Birth/Age: 1940 / 84 years Cardiologist:  Gender:            F                    Fellow:  Encounter#:        [id number]           Surgeon:       Study:            Left Heart Cath  Additional Study: PCI - Percutaneous Coronary Intervention  Additional Study: IVUS - Intravascular Ultrasound       Indications:  KATHIE DIETZ is a 84 year old female who presents with hypertension, dyslipidemia, prior myocardial infarction, prior percutaneous coronary intervention and a chest pain assessment of typical angina. Worsening angina.     Appropriate Use Criteria:  High-risk noninvasive findings in a medically managed patient with worsening/limiting symptoms and worsening findings; AUC score = 9.     This report has been modified by [id number] Rocha MD on 6/24/2024 12:39:57 PM due to add data.  Stress test performed: No. CTA performed: No. [place] accessed: No. LVEF  Assessed: Yes. LVEF = 60%.  Cardiac arrest: No.  Cardiac surgical consult: No.  Cardiovascular Instability: No  Frailty status of patient entering lab: 7 = Severely frail.       Procedure Description:  We initially tried to get access and go through R  radial access, however the microwire did not cross to the brachial artery, so we opted to go through alternative access - R femoral.  After infiltration with 2% Lidocaine, the right femoral artery was cannulated with a modified Seldinger technique with micropuncture and ultrasound guidance. Subsequently a 5 American sheath was placed in the right femoral artery. Selective coronary catheterization was performed using a 5 Fr catheter(s) exchanged over a guide wire to cannulate the coronary arteries.  Multiple injections of contrast were made into the left and right coronary arteries with angiograms recorded in multiple projections. After completion of the procedure, femoral artery angiography was performed. This demonstrated a common femoral artery puncture appropriate for closure. A Vascade 5F vascular closure Device was placed per protocol.     Coronary Angiography:  The coronary circulation is right dominant.     Left Main Coronary Artery:  The left main coronary artery is a normal caliber vessel. The left main arises normally from the left coronary sinus of Valsalva and bifurcates into the LAD and circumflex coronary arteries. The left main coronary artery showed a normal vessel.     Left Anterior Descending Coronary Artery Distribution:  The left anterior descending coronary artery is a normal caliber vessel. The LAD arises normally from the left main coronary artery. The LAD demonstrated atherosclerotic disease. The distal left anterior descending coronary artery showed 90% in-stent restenosis. This lesion was irregular. An additional lesion, located at the proximal left anterior descending coronary artery, revealed 70% in-stent restenosis. This second lesion was irregular. The 1st diagonal branch is a normal caliber vessel. The 1st diagonal branch showed no significant disease or stenosis greater than 30%. Patent stent to 1st Dg.     Circumflex Coronary Artery Distribution:  The circumflex coronary artery is a  normal caliber vessel. The circumflex arises normally from the left main coronary artery and terminates in the AV groove. The circumflex revealed atherosclerotic disease. The mid circumflex coronary artery showed 50% stenosis. This lesion was irregular. The 1st obtuse marginal branch is a normal caliber vessel. The 1st obtuse marginal branch showed no significant disease or stenosis greater than 30%.     Ramus Intermedius:  The ramus intermedius is a normal caliber vessel. The ramus intermedius arises normally from the left main coronary artery. The ramus intermedius showed no significant disease or stenosis greater than 30%.     Right Coronary Artery Distribution:    The right coronary artery is a normal caliber vessel. The RCA arises normally from the right sinus of Valsalva. The RCA showed atherosclerotic disease. The ostial and proximal right coronary artery showed 80% in-stent restenosis. This lesion was irregular. The right posterolateral branch is a small caliber vessel. The right posterolateral branch showed no significant disease or stenosis greater than 30%. The right posterior descending artery is a small caliber vessel. The right posterior descending artery showed no significant disease or stenosis greater than 30%.       Left Ventriculography:  The LV ejection fraction was 60 to 65%. All left ventricular regional wall segments contract normally.     Coronary Interventions:  Angiography reveals a 90% stenosis of the distal left anterior descending coronary artery. Pre-intervention JOHNY flow was 3. Percutaneous coronary intervention was performed within the distal left anterior descending. The vessel was pre-dilated using a compliant balloon 2.0 mm x 12 mm at 12 MINGO. Geary Rex drug-eluting stent 2.0 mm x 22 mm was advanced to the lesion and implanted at 12 MINOG. The stent was post dilated using a non-compliant balloon 2.5 mm x 15 mm at 15 MINGO. The stenosis was successfully reduced from 90% to <10%.  Post intervention Intravascular Ultrasound (IVUS) was performed within the distal left anterior descending . The stent demonstrated good apposition. No thrombus was visualized. No edge dissection is visualized. Post-intervention JOHNY flow was 3.     Angiography reveals an 70% stenosis of the proximal left anterior descending. Pre-intervention JOHNY flow was 3. Percutaneous coronary intervention was performed within the proximal left anterior descending. The vessel was pre-dilated using a compliant balloon 2.0 mm x 12 mm at 12 MINGO. Caldwell William drug-eluting stent 2.75 mm x 12 mm was advanced to the lesion and implanted at 12 MINGO. The stent was post dilated using a non-compliant balloon 3.0 mm x 15 mm at 20 MINGO. The stenosis was successfully reduced from 70% to <10%. Post intervention Intravascular Ultrasound (IVUS) was performed within the proximal left anterior descending . The stent demonstrated good apposition. No thrombus was visualized. No edge dissection is visualized. Post-intervention JOHNY flow was 3.     Angiography reveals an 80% stenosis of the ostial and proximal right coronary artery. Pre-intervention JOHNY flow was 3. Percutaneous coronary intervention was performed within the ostial and proximal right coronary artery. The vessel was pre-dilated using a compliant balloon 2.5 mm x 15 mm at 15 MINGO. Caldwell William drug-eluting stent 2.5 mm x 15 mm was advanced to the lesion and implanted at 12 MINGO. The stent was post dilated using a non-compliant balloon 3.0 mm x 15 mm at 20 MINGO. The stenosis was successfully reduced from 80% to <10%. Post intervention Intravascular Ultrasound (IVUS) was performed within the ostial and proximal right coronary artery . The stent demonstrated good apposition. No thrombus was visualized. No edge dissection is visualized. Post-intervention JOHNY flow was 3.     Coronary Lesion Summary:  Vessel            Stenosis           Vessel Segment  LAD        90% in-stent restenosis        distal  LAD        70% in-stent restenosis      proximal  Circumflex      50% stenosis               mid  RCA        80% in-stent restenosis ostial and proximal       Complications:  No in-lab complications observed.     Cardiac Cath Post Procedure Notes:  Post Procedure Diagnosis: Triple vessel disease.  Blood Loss:               Estimated blood loss during the procedure was 0 mls.  Specimens Removed:        Number of specimen(s) removed: none.       Recommendations:  Maximize medical therapy.  Agressive risk factor modification efforts.  Follow-up with cardiology clinic.  Anti-platelet therapy with Aspirin and Clopidogrel (Plavix) 75mg daily.    ____________________________________________________________________________________  CONCLUSIONS:   1. Right coronary artery system dominance.   2. Multivessel coronary artery disease.   3. Prox LAD 70% in-stent restenosis; distal LAD 90% in-stent restenosis.   4. Mid LCx 50% lesion.   5. Patent stent to 1st Dg.   6. Ostial / prox RCA 80% in-stent restenosis.   7. Preserved LV systolic function.   8. S/P Successful PCI to distal and proximal LAD using 2 drug-eluting stents (RONALD), guided by intravascular ultrasound (IVUS).   9. S/P Successful PCI to ostial/prox RCA using one RONALD, guided by IVUS.    ICD 10 Codes:  Atherosclerotic heart disease of native coronary artery with angina pectoris with documented spasm-I25.111     CPT Codes:  Left Heart Cath (visualization of coronaries) and LV-22947; Stent w angioplasty Left Anterior Descending single major Artery branch (PCI)-07034.LD; Stent Right Coronary ea addl branch of major Artery (PCI)-88138.RC; IVUS, Left Anterior Descending initial Vessel (PCI)-86917.LD; IVUS, Left Main ea add'l Vessel (PCI)-18975.LM; IVUS, Right Coronary ea add'l Vessel (PCI)-29423.RC; Moderate Sedation Services initial 15 minutes patient >5 years-52894; Moderate Sedation Services 1st additional 15 minutes patient >5 years-57211; Moderate Sedation  Services 2nd additional 15 minutes patient >5 years-32275; Moderate Sedation Services 3rd additional 15 minutes patient >5 years-93563     [id number] Ishaan Rocha MD  Performing Physician  Electronically signed by [id number] Ishaan Rocha MD on 6/24/2024 at  12:39:57 PM         ** Final (Updated) **  Electrocardiogram 12 Lead  Normal sinus rhythm  Left axis deviation  Anterolateral infarct , age undetermined  Prolonged QT  Abnormal ECG  When compared with ECG of 24-JUN-2024 07:47, (unconfirmed)  Significant changes have occurred  Confirmed by Josr Damico ([id number]) on 6/25/2024 8:16:31 AM      Physical Exam  Gen: NAD, awake, alert, oriented to name.  Follows simple commands.  Not in acute distress.    HEENT: Normal size, shape; EOMI intact. Sclera normal.Ears normal.Oropharynx pink and moist.  Neck: Paraspinal area on the right side of the neck tender to palpate.  No deformities noted.  No mass.  Spinal area nontender.  Neurovascularly intact.  Supple.no masses noted in neck, no lymphadenopathy, no tracheal deviation, non-palpable thyroid.No neck rigidity.  Chest: Chest wall on the left anterior chest tender to palpate, no crepitus, no deformity noted.  Chest symmetry with respirations, no wheezes, crackles  CVS: RRR, no rubs  Abd: soft, NT/ND, +BS  Ext: no Clubbing/Cyanosis/edema, non-tender.Pulse 2+. Homans Negative.  Skin: Dry, warm, good condition  Neuro: Cranial nerves II to XII grossly intact.  Grossly moves all extremities, reflexes intact.  Sensory intact.  Gait could not be assessed.  Psy: Calm, follows simple commands.  Assessment/Plan      Principal Problem:    TIA (transient ischemic attack)  Active Problems:    Anemia    CAD (coronary artery disease)    Restless leg syndrome    Delirium    Hypertensive urgency    Other chest pain  Dysphagia, possible due to her confusion.  Chest wall pain.  Neck pain.  History of chronic pain syndrome on narcotics.    Plan    Add lidocaine patch to left  anterior chest wall, neck.  NG tube placement as patient failed speech therapy.  Reevaluation after confusion improves.  Will do aspirin and Plavix and statin through NG tube.  Other medications 2.  Resume low-dose of her home narcotics for pain.  Continue neurochecks, fall precautions.  Case was reviewed with neurology by cardiology, MRI negative.  MRA head and neck reviewed.  Echo and carotid Doppler pending.  EEG ordered.  If worsening, will start Keppra for neurology.\  PT, OT.  Appreciate cardiology input.  Blood pressure stable, resume home medications through NG tube.  Resume Mirapex through her NG tube.  Labs in AM.    DVT prophylaxis on Lovenox.  CODE STATUS full.  Disposition in 2 to 3 days.    Total time spent 40 minutes.    Had a long discussion with , son and other family members.  All questions and concerns have been addressed.    Soraya Casarez MD

## 2024-06-25 NOTE — PROGRESS NOTES
Speech-Language Pathology    SLP Adult Inpatient Speech-Language Pathology Clinical Swallow Evaluation    Patient Name: Tierney Madrid  MRN: [id number]  Today's Date: 6/25/2024   Time Calculation  Start Time: 0853  Stop Time: 0931  Time Calculation (min): 38 min         Current Problem:   1. Other chest pain  Cardiac Catheterization Procedure    Cardiac Catheterization Procedure      2. Coronary artery disease due to lipid rich plaque  Referral to Cardiac Rehab - outpatient (for providers who will be following patient along cardiac rehab)    atorvastatin (Lipitor) 40 mg tablet      3. Coronary stent restenosis, initial encounter  Referral to Cardiac Rehab - outpatient (for providers who will be following patient along cardiac rehab)    atorvastatin (Lipitor) 40 mg tablet      4. Presence of drug coated stent in coronary artery  Referral to Cardiac Rehab - outpatient (for providers who will be following patient along cardiac rehab)    atorvastatin (Lipitor) 40 mg tablet      5. 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 TIA (transient ischemic attack)  Vascular US carotid artery duplex bilateral    Vascular US carotid artery duplex bilateral      7. Other cerebral infarction (Multi)  Vascular US carotid artery duplex bilateral    Vascular US carotid artery duplex bilateral            Recommendations:  Risk for Aspiration: Yes  Solid Diet Recommendations : NPO  Liquid Diet Recommendations: NPO  Dysphagia Goals: Patient will progress to advanced diet      Assessment:  Prognosis: Fair  Treatment Provided: No  Strengths: Family/Caregiver Support  Barriers: Cognition, Comorbidities      Plan:  Inpatient/Swing Bed or Outpatient: Inpatient  Treatment/Interventions: Bolus trials  SLP Frequency: 3x per week  Duration: 1 week  Diet Recommendations: Solid, Liquid  Solid Consistency: NPO  Liquid Consistency: NPO  Next  Treatment Priority: PO trials  Discussed POC: Patient, Nursing, Caregiver/family  Discussed Risks/Benefits: Yes, Patient, Caregiver/Family, Nursing  Patient/Caregiver Agreeable: Yes      Julian Madrid is a 84 y.o. female received a swallowing evaluation to r/o aspiration. Pt was also referred for speech/language consult, but unable to complete today.     Pt was very emotional. Pt was crying throughout the evaluation. Caregiver at bedside reported pt typically talkative and eating/drinking regular diet and thin liquids.    Bedside nurse reported pt unable to swallow last night and has not been able to speak.       General Visit Information:  Patient Class: Inpatient  Ordering Physician: Leida  Reason for Referral: Swallow and Speech Evaluation  Past Medical History Relevant to Rehab: anemia, CAD, HTN, hiatal hernia, osteopenia, hyperlipedemia  Patient Seen During This Visit: Yes  Total Number of Visits : 1  Prior to Session Communication: Bedside nurse  Reviewed Procedures and Risks: Yes  Date of Onset: 06/24/24  Date of Order: 06/25/24  [employer] Diet: regular;thin  Current Diet : NPO  Dysphagia Diagnosis: Moderate to severe oral stage dysphagia, Mild to moderate pharyngeal stage dysphagia      Objective   Goals:  In 1 week...  1. Patient will complete PO trials for diet upgrade with no overt s/s of aspiration or penetration in 90% of trials.   GOAL START: 6/25/2024    GOAL END:    Baseline Assessment:  Respiratory Status: Room air  Behavior/Cognition: Cooperative, Agitated, Impulsive, Requires cueing  Patient Positioning: Partially/Semi Reclined      Pain:  Pain Assessment: PAINAD (Pain Assessment in Advanced Dementia Scale)  0-10 (Numeric) Pain Score: 6  Pain Type: Acute pain  Pain Location: Back  Pain Frequency: Intermittent  Pain Interventions: Medication (See MAR)       Oral/Motor Assessment:  Oral Hygiene: poor  Dentition: Some Missing Teeth  Oral Motor: Unable to Complete      Consistencies  Trialed:  Consistencies Trialed: Yes  Consistencies Trialed: Thin (IDDSI Level 0) - Spoon, Nectar thick/mildly thick (IDDSI Level 2) - Spoon, Pureed/extremely thick (IDDSI Level 4)    Pt completed PO trials of the following:  -thin liquids single sips via 1/2 teaspoon SLP fed: Pt had immediate cough after 100% of trials.   -nectar thick liquids  single sips via 1/2 teaspoon SLP fed: Pt had delayed cough after 50% of trials. Pt had cough at baseline.  -puree via 1/2 teaspoon SLP fed: Pt had delayed cough and emesis after 100% of trials.     Pt was given oral care prior to PO trials. At this time remain NPO.       Clinical Observations:  Patient Positioning: Upright in Bed  Signs/Symptoms of Aspiration: Cough, Increased Congestion  Other Signs/Symptoms of Difficulty with Feeding: Emesis  Overt Signs or Symptoms of Aspiration: Thin (IDDSI Level 0) - Spoon, Nectar Thick/Mildly Thick (IDDSI Level 2) - Spoon, Pureed/Extremely Thick (IDDSI Level 4)  Anterior Spillage: Thin (IDDSI Level 0) - Spoon, Nectar Thick/Mildly Thick (IDDSI Level 2) - Spoon  Immediate Cough: Thin (IDDSI Level 0) - Spoon      SLP Outcome Measures:  Unable to complete EAT-10 d/t pt's expressive language deficits.     Inpatient:  Education Documentation  Modified Diet Training, taught by Wendy Garza, SLP at 6/25/2024 10:00 AM.  Learner: Family  Readiness: Acceptance  Method: Explanation  Response: Verbalizes Understanding    Education Comments  No comments found.

## 2024-06-25 NOTE — CARE PLAN
The patient's goals for the shift include      The clinical goals for the shift include control pain, provide sleep, assess cardiac/neuro status, provide comfort/reassurance      Problem: Discharge Planning  Goal: Discharge to home or other facility with appropriate resources  Outcome: Not Progressing  Flowsheets (Taken 6/25/2024 0227)  Discharge to home or other facility with appropriate resources:   Identify barriers to discharge with patient and caregiver   Arrange for needed discharge resources and transportation as appropriate   Identify discharge learning needs (meds, wound care, etc)     Problem: Chronic Conditions and Co-morbidities  Goal: Patient's chronic conditions and co-morbidity symptoms are monitored and maintained or improved  Outcome: Not Progressing  Flowsheets (Taken 6/25/2024 0227)  Care Plan - Patient's Chronic Conditions and Co-Morbidity Symptoms are Monitored and Maintained or Improved:   Collaborate with multidisciplinary team to address chronic and comorbid conditions and prevent exacerbation or deterioration   Monitor and assess patient's chronic conditions and comorbid symptoms for stability, deterioration, or improvement     Problem: Neurosensory - Adult  Goal: Achieves stable or improved neurological status  Outcome: Not Progressing  Flowsheets (Taken 6/25/2024 0227)  Achieves stable or improved neurological status:   Assess for and report changes in neurological status   Initiate measures to prevent increased intracranial pressure   Maintain blood pressure and fluid volume within ordered parameters to optimize cerebral perfusion and minimize risk of hemorrhage   Monitor temperature, glucose, and sodium. Initiate appropriate interventions as ordered  Goal: Absence of seizures  Outcome: Not Progressing  Flowsheets (Taken 6/25/2024 0227)  Absence of seizures:   Monitor for seizure activity.  If seizure occurs, document description, location of movements and any associated apnea   If  seizure occurs, turn head to side and suction secretions as needed   Support airway/breathing, administer oxygen as needed   Diagnostic studies as ordered   Teach learners to recognize seizure activity       Problem: Discharge Planning  Goal: Discharge to home or other facility with appropriate resources  Outcome: Not Progressing  Flowsheets (Taken 6/25/2024 0227)  Discharge to home or other facility with appropriate resources:   Identify barriers to discharge with patient and caregiver   Arrange for needed discharge resources and transportation as appropriate   Identify discharge learning needs (meds, wound care, etc)     Problem: Chronic Conditions and Co-morbidities  Goal: Patient's chronic conditions and co-morbidity symptoms are monitored and maintained or improved  Outcome: Not Progressing  Flowsheets (Taken 6/25/2024 0227)  Care Plan - Patient's Chronic Conditions and Co-Morbidity Symptoms are Monitored and Maintained or Improved:   Collaborate with multidisciplinary team to address chronic and comorbid conditions and prevent exacerbation or deterioration   Monitor and assess patient's chronic conditions and comorbid symptoms for stability, deterioration, or improvement     Problem: Neurosensory - Adult  Goal: Achieves stable or improved neurological status  Outcome: Not Progressing  Flowsheets (Taken 6/25/2024 0227)  Achieves stable or improved neurological status:   Assess for and report changes in neurological status   Initiate measures to prevent increased intracranial pressure   Maintain blood pressure and fluid volume within ordered parameters to optimize cerebral perfusion and minimize risk of hemorrhage   Monitor temperature, glucose, and sodium. Initiate appropriate interventions as ordered  Goal: Absence of seizures  Outcome: Not Progressing  Flowsheets (Taken 6/25/2024 0227)  Absence of seizures:   Monitor for seizure activity.  If seizure occurs, document description, location of movements and any  associated apnea   If seizure occurs, turn head to side and suction secretions as needed   Support airway/breathing, administer oxygen as needed   Diagnostic studies as ordered   Teach learners to recognize seizure activity

## 2024-06-25 NOTE — PROGRESS NOTES
Physical Therapy                 Therapy Communication Note    Patient Name: Tierney Madrid  MRN: [id number]  Today's Date: 6/25/2024     Discipline: Physical Therapy    Missed Visit Reason: Missed Visit Reason: Patient placed on medical hold    Missed Time: Attempt    Comment:  PT order received and chart reviewed.  Also spoke with SLP who attempted speech eval.  Patient not yet appropriate for skilled PT due to inability to follow commands and lethargic.  Will reattempt as appropriate.

## 2024-06-25 NOTE — PROGRESS NOTES
"Music Therapy Note    Tierney Madrdi was referred by Jerri Mathews RN.     Therapy Session  Referral Type: New referral this admission  Visit Type: New visit  Session Start Time: 1440  Session End Time: 1450  Intervention Delivery: In-person  Conflict of Service: None  Number of family members present: 4  Family Present for Session: Spouse/Significant Other, Child  Family Participation: Interactive     Pre-assessment  Unable to Assess Reason: Outcomes not assessed  Mood/Affect: Tired/lethargic         Treatment/Interventions  Music Therapy Interventions: Assessment    Post-assessment  Unable to Assess Reason: Did not provide expressive therapy intervention  Mood/Affect: Tired/lethargic  Total Session Time (min): 10 minutes    Narrative  Assessment Detail: Pt found resting in bed, awake, with  Nadir and friend at bedside. Pt's  spoke, asking questions about music therapy and shared pt's music preferences. Pt's  stating that pt will listen to music if it's available but doesn't seek it out. Pt fell asleep at this time. Pt's family requested a follow-up session, stating that tomorrow may be better. Pt's daughter-in-law stating that pt had previously requested the song \"Give Thanks\". Pt's  expressed preference for country and gospel.  Follow-up: MT will follow-up as applicable.    Education Documentation  Coping Strategies, taught by NONI Isabel at 6/25/2024  4:16 PM.  Learner: Family, Patient  Readiness: Acceptance  Method: Explanation, Handout  Response: Verbalizes Understanding    Relaxation, taught by NONI Isabel at 6/25/2024  4:16 PM.  Learner: Family, Patient  Readiness: Acceptance  Method: Explanation, Handout  Response: Verbalizes Understanding    Integrative Health, taught by NONI Isabel at 6/25/2024  4:16 PM.  Learner: Family, Patient  Readiness: Acceptance  Method: Explanation, Handout  Response: Verbalizes Understanding            "

## 2024-06-25 NOTE — PROGRESS NOTES
Occupational Therapy                 Therapy Communication Note    Patient Name: Tierney Madrid  MRN: [id number]  Today's Date: 6/25/2024     Discipline: Occupational Therapy    Missed Visit Reason: Missed Visit Reason: Patient placed on medical hold (bedside nurse advises to hold OT eval and pt may be more appropriate tomorrow.  Will re-attempt OT eval as pt is able.)

## 2024-06-26 ENCOUNTER — APPOINTMENT (OUTPATIENT)
Dept: CARDIOLOGY | Facility: HOSPITAL | Age: 84
End: 2024-06-26
Payer: MEDICARE

## 2024-06-26 LAB
ALBUMIN SERPL BCP-MCNC: 3.2 G/DL (ref 3.4–5)
ALP SERPL-CCNC: 75 U/L (ref 33–136)
ALT SERPL W P-5'-P-CCNC: 9 U/L (ref 7–45)
ANION GAP SERPL CALC-SCNC: 12 MMOL/L (ref 10–20)
ARTERIAL PATENCY WRIST A: NEGATIVE
AST SERPL W P-5'-P-CCNC: 22 U/L (ref 9–39)
BASE EXCESS BLDA CALC-SCNC: -4.5 MMOL/L (ref -2–3)
BASOPHILS # BLD AUTO: 0.02 X10*3/UL (ref 0–0.1)
BASOPHILS NFR BLD AUTO: 0.2 %
BILIRUB SERPL-MCNC: 0.5 MG/DL (ref 0–1.2)
BODY TEMPERATURE: 37 DEGREES CELSIUS
BUN SERPL-MCNC: 41 MG/DL (ref 6–23)
CALCIUM SERPL-MCNC: 8 MG/DL (ref 8.6–10.3)
CARDIAC TROPONIN I PNL SERPL HS: 886 NG/L (ref 0–13)
CHLORIDE SERPL-SCNC: 106 MMOL/L (ref 98–107)
CO2 SERPL-SCNC: 20 MMOL/L (ref 21–32)
CREAT SERPL-MCNC: 1.06 MG/DL (ref 0.5–1.05)
EGFRCR SERPLBLD CKD-EPI 2021: 52 ML/MIN/1.73M*2
EOSINOPHIL # BLD AUTO: 0.07 X10*3/UL (ref 0–0.4)
EOSINOPHIL NFR BLD AUTO: 0.9 %
ERYTHROCYTE [DISTWIDTH] IN BLOOD BY AUTOMATED COUNT: 13.1 % (ref 11.5–14.5)
GLUCOSE BLD MANUAL STRIP-MCNC: 117 MG/DL (ref 74–99)
GLUCOSE BLD MANUAL STRIP-MCNC: 125 MG/DL (ref 74–99)
GLUCOSE BLD MANUAL STRIP-MCNC: 128 MG/DL (ref 74–99)
GLUCOSE BLD MANUAL STRIP-MCNC: 128 MG/DL (ref 74–99)
GLUCOSE SERPL-MCNC: 228 MG/DL (ref 74–99)
HCO3 BLDA-SCNC: 20.3 MMOL/L (ref 22–26)
HCT VFR BLD AUTO: 32.3 % (ref 36–46)
HGB BLD-MCNC: 10.1 G/DL (ref 12–16)
IMM GRANULOCYTES # BLD AUTO: 0.02 X10*3/UL (ref 0–0.5)
IMM GRANULOCYTES NFR BLD AUTO: 0.2 % (ref 0–0.9)
INHALED O2 CONCENTRATION: 21 %
LACTATE SERPL-SCNC: 1.9 MMOL/L (ref 0.4–2)
LYMPHOCYTES # BLD AUTO: 2.69 X10*3/UL (ref 0.8–3)
LYMPHOCYTES NFR BLD AUTO: 33.5 %
MCH RBC QN AUTO: 30.1 PG (ref 26–34)
MCHC RBC AUTO-ENTMCNC: 31.3 G/DL (ref 32–36)
MCV RBC AUTO: 96 FL (ref 80–100)
MONOCYTES # BLD AUTO: 0.36 X10*3/UL (ref 0.05–0.8)
MONOCYTES NFR BLD AUTO: 4.5 %
NEUTROPHILS # BLD AUTO: 4.87 X10*3/UL (ref 1.6–5.5)
NEUTROPHILS NFR BLD AUTO: 60.7 %
NRBC BLD-RTO: 0 /100 WBCS (ref 0–0)
OXYHGB MFR BLDA: 94.7 % (ref 94–98)
PCO2 BLDA: 36 MM HG (ref 38–42)
PH BLDA: 7.36 PH (ref 7.38–7.42)
PLATELET # BLD AUTO: 264 X10*3/UL (ref 150–450)
PO2 BLDA: 77 MM HG (ref 85–95)
POTASSIUM SERPL-SCNC: 3.4 MMOL/L (ref 3.5–5.3)
PROT SERPL-MCNC: 6 G/DL (ref 6.4–8.2)
RBC # BLD AUTO: 3.35 X10*6/UL (ref 4–5.2)
SAO2 % BLDA: 96 % (ref 94–100)
SODIUM SERPL-SCNC: 135 MMOL/L (ref 136–145)
WBC # BLD AUTO: 8 X10*3/UL (ref 4.4–11.3)

## 2024-06-26 PROCEDURE — 93005 ELECTROCARDIOGRAM TRACING: CPT

## 2024-06-26 PROCEDURE — 99222 1ST HOSP IP/OBS MODERATE 55: CPT

## 2024-06-26 PROCEDURE — 84484 ASSAY OF TROPONIN QUANT: CPT | Performed by: INTERNAL MEDICINE

## 2024-06-26 PROCEDURE — 1200000002 HC GENERAL ROOM WITH TELEMETRY DAILY

## 2024-06-26 PROCEDURE — 85025 COMPLETE CBC W/AUTO DIFF WBC: CPT | Performed by: INTERNAL MEDICINE

## 2024-06-26 PROCEDURE — 94761 N-INVAS EAR/PLS OXIMETRY MLT: CPT

## 2024-06-26 PROCEDURE — 99291 CRITICAL CARE FIRST HOUR: CPT | Performed by: INTERNAL MEDICINE

## 2024-06-26 PROCEDURE — 2500000004 HC RX 250 GENERAL PHARMACY W/ HCPCS (ALT 636 FOR OP/ED): Performed by: HOSPITALIST

## 2024-06-26 PROCEDURE — 36415 COLL VENOUS BLD VENIPUNCTURE: CPT | Performed by: INTERNAL MEDICINE

## 2024-06-26 PROCEDURE — 2500000002 HC RX 250 W HCPCS SELF ADMINISTERED DRUGS (ALT 637 FOR MEDICARE OP, ALT 636 FOR OP/ED): Performed by: HOSPITALIST

## 2024-06-26 PROCEDURE — 83605 ASSAY OF LACTIC ACID: CPT | Performed by: INTERNAL MEDICINE

## 2024-06-26 PROCEDURE — 82805 BLOOD GASES W/O2 SATURATION: CPT | Performed by: INTERNAL MEDICINE

## 2024-06-26 PROCEDURE — 97530 THERAPEUTIC ACTIVITIES: CPT | Mod: GP | Performed by: PHYSICAL THERAPIST

## 2024-06-26 PROCEDURE — 2500000001 HC RX 250 WO HCPCS SELF ADMINISTERED DRUGS (ALT 637 FOR MEDICARE OP): Performed by: HOSPITALIST

## 2024-06-26 PROCEDURE — 2580000001 HC RX 258 IV SOLUTIONS

## 2024-06-26 PROCEDURE — 2500000005 HC RX 250 GENERAL PHARMACY W/O HCPCS: Performed by: HOSPITALIST

## 2024-06-26 PROCEDURE — 97162 PT EVAL MOD COMPLEX 30 MIN: CPT | Mod: GP | Performed by: PHYSICAL THERAPIST

## 2024-06-26 PROCEDURE — 97530 THERAPEUTIC ACTIVITIES: CPT | Mod: GO

## 2024-06-26 PROCEDURE — 92526 ORAL FUNCTION THERAPY: CPT | Mod: GN

## 2024-06-26 PROCEDURE — 97165 OT EVAL LOW COMPLEX 30 MIN: CPT | Mod: GO

## 2024-06-26 PROCEDURE — 80053 COMPREHEN METABOLIC PANEL: CPT | Performed by: INTERNAL MEDICINE

## 2024-06-26 PROCEDURE — 2500000001 HC RX 250 WO HCPCS SELF ADMINISTERED DRUGS (ALT 637 FOR MEDICARE OP): Performed by: INTERNAL MEDICINE

## 2024-06-26 PROCEDURE — 99233 SBSQ HOSP IP/OBS HIGH 50: CPT | Performed by: HOSPITALIST

## 2024-06-26 PROCEDURE — 93010 ELECTROCARDIOGRAM REPORT: CPT | Performed by: STUDENT IN AN ORGANIZED HEALTH CARE EDUCATION/TRAINING PROGRAM

## 2024-06-26 PROCEDURE — 82947 ASSAY GLUCOSE BLOOD QUANT: CPT | Mod: 91

## 2024-06-26 RX ORDER — PRAMIPEXOLE DIHYDROCHLORIDE 0.5 MG/1
1 TABLET ORAL NIGHTLY
Status: DISCONTINUED | OUTPATIENT
Start: 2024-06-26 | End: 2024-06-28

## 2024-06-26 RX ORDER — OXYCODONE HYDROCHLORIDE 5 MG/1
5 TABLET ORAL EVERY 6 HOURS PRN
Status: DISCONTINUED | OUTPATIENT
Start: 2024-06-26 | End: 2024-06-28 | Stop reason: HOSPADM

## 2024-06-26 RX ORDER — SODIUM CHLORIDE AND POTASSIUM CHLORIDE 300; 900 MG/100ML; MG/100ML
75 INJECTION, SOLUTION INTRAVENOUS CONTINUOUS
Status: DISCONTINUED | OUTPATIENT
Start: 2024-06-26 | End: 2024-06-27

## 2024-06-26 RX ORDER — SODIUM CHLORIDE AND POTASSIUM CHLORIDE 300; 900 MG/100ML; MG/100ML
INJECTION, SOLUTION INTRAVENOUS
Status: COMPLETED
Start: 2024-06-26 | End: 2024-06-26

## 2024-06-26 RX ORDER — ONDANSETRON HYDROCHLORIDE 2 MG/ML
4 INJECTION, SOLUTION INTRAVENOUS EVERY 6 HOURS PRN
Status: DISCONTINUED | OUTPATIENT
Start: 2024-06-26 | End: 2024-06-28 | Stop reason: HOSPADM

## 2024-06-26 ASSESSMENT — COGNITIVE AND FUNCTIONAL STATUS - GENERAL
WALKING IN HOSPITAL ROOM: TOTAL
MOVING TO AND FROM BED TO CHAIR: A LOT
WALKING IN HOSPITAL ROOM: A LOT
DRESSING REGULAR LOWER BODY CLOTHING: TOTAL
PERSONAL GROOMING: A LOT
CLIMB 3 TO 5 STEPS WITH RAILING: TOTAL
DRESSING REGULAR UPPER BODY CLOTHING: A LOT
DRESSING REGULAR LOWER BODY CLOTHING: A LOT
STANDING UP FROM CHAIR USING ARMS: A LOT
DAILY ACTIVITIY SCORE: 12
MOVING FROM LYING ON BACK TO SITTING ON SIDE OF FLAT BED WITH BEDRAILS: A LOT
TURNING FROM BACK TO SIDE WHILE IN FLAT BAD: A LOT
EATING MEALS: A LOT
MOBILITY SCORE: 10
DAILY ACTIVITIY SCORE: 10
MOVING FROM LYING ON BACK TO SITTING ON SIDE OF FLAT BED WITH BEDRAILS: A LOT
MOVING FROM LYING ON BACK TO SITTING ON SIDE OF FLAT BED WITH BEDRAILS: A LOT
TOILETING: TOTAL
MOVING TO AND FROM BED TO CHAIR: A LOT
MOBILITY SCORE: 11
HELP NEEDED FOR BATHING: A LOT
TOILETING: A LOT
CLIMB 3 TO 5 STEPS WITH RAILING: TOTAL
DRESSING REGULAR UPPER BODY CLOTHING: A LOT
STANDING UP FROM CHAIR USING ARMS: A LOT
HELP NEEDED FOR BATHING: A LOT
EATING MEALS: A LOT
MOBILITY SCORE: 10
TURNING FROM BACK TO SIDE WHILE IN FLAT BAD: A LOT
TURNING FROM BACK TO SIDE WHILE IN FLAT BAD: A LOT
CLIMB 3 TO 5 STEPS WITH RAILING: TOTAL
STANDING UP FROM CHAIR USING ARMS: A LOT
MOVING TO AND FROM BED TO CHAIR: A LOT
PERSONAL GROOMING: A LOT
WALKING IN HOSPITAL ROOM: TOTAL

## 2024-06-26 ASSESSMENT — PAIN SCALES - PAIN ASSESSMENT IN ADVANCED DEMENTIA (PAINAD)
BODYLANGUAGE: RELAXED
BODYLANGUAGE: RELAXED
BREATHING: NORMAL
CONSOLABILITY: NO NEED TO CONSOLE
FACIALEXPRESSION: SMILING OR INEXPRESSIVE
TOTALSCORE: 0
BODYLANGUAGE: RELAXED
CONSOLABILITY: NO NEED TO CONSOLE
TOTALSCORE: 2
BREATHING: NORMAL
CONSOLABILITY: NO NEED TO CONSOLE
CONSOLABILITY: DISTRACTED OR REASSURED BY VOICE/TOUCH
FACIALEXPRESSION: SAD, FRIGHTENED, FROWN
BODYLANGUAGE: RELAXED
BREATHING: NORMAL
FACIALEXPRESSION: SMILING OR INEXPRESSIVE
FACIALEXPRESSION: SMILING OR INEXPRESSIVE
TOTALSCORE: 0
TOTALSCORE: 0
BREATHING: NORMAL

## 2024-06-26 ASSESSMENT — PAIN - FUNCTIONAL ASSESSMENT
PAIN_FUNCTIONAL_ASSESSMENT: 0-10
PAIN_FUNCTIONAL_ASSESSMENT: PAINAD (PAIN ASSESSMENT IN ADVANCED DEMENTIA SCALE)
PAIN_FUNCTIONAL_ASSESSMENT: 0-10
PAIN_FUNCTIONAL_ASSESSMENT: PAINAD (PAIN ASSESSMENT IN ADVANCED DEMENTIA SCALE)
PAIN_FUNCTIONAL_ASSESSMENT: PAINAD (PAIN ASSESSMENT IN ADVANCED DEMENTIA SCALE)
PAIN_FUNCTIONAL_ASSESSMENT: 0-10
PAIN_FUNCTIONAL_ASSESSMENT: PAINAD (PAIN ASSESSMENT IN ADVANCED DEMENTIA SCALE)

## 2024-06-26 ASSESSMENT — PAIN SCALES - GENERAL
PAINLEVEL_OUTOF10: 8
PAINLEVEL_OUTOF10: 0 - NO PAIN
PAINLEVEL_OUTOF10: 8
PAINLEVEL_OUTOF10: 8
PAINLEVEL_OUTOF10: 0 - NO PAIN

## 2024-06-26 ASSESSMENT — ACTIVITIES OF DAILY LIVING (ADL)
ADL_ASSISTANCE: INDEPENDENT
BATHING_ASSISTANCE: TOTAL
ADL_ASSISTANCE: INDEPENDENT

## 2024-06-26 ASSESSMENT — PAIN SCALES - WONG BAKER: WONGBAKER_NUMERICALRESPONSE: NO HURT

## 2024-06-26 NOTE — CARE PLAN
The patient's goals for the shift include      The clinical goals for the shift include control pain, provide rest/sleep, improve orientation, monitor hemodynamics, provide comfort/reassurance, keep safe from falls      Problem: Pain - Adult  Goal: Verbalizes/displays adequate comfort level or baseline comfort level  Outcome: Progressing  Flowsheets (Taken 6/26/2024 0035)  Verbalizes/displays adequate comfort level or baseline comfort level:   Encourage patient to monitor pain and request assistance   Assess pain using appropriate pain scale   Administer analgesics based on type and severity of pain and evaluate response     Problem: Safety - Adult  Goal: Free from fall injury  Outcome: Progressing  Flowsheets (Taken 6/26/2024 0035)  Free from fall injury:   Instruct family/caregiver on patient safety   Based on caregiver fall risk screen, instruct family/caregiver to ask for assistance with transferring infant if caregiver noted to have fall risk factors     Problem: Discharge Planning  Goal: Discharge to home or other facility with appropriate resources  Outcome: Progressing  Flowsheets (Taken 6/26/2024 0035)  Discharge to home or other facility with appropriate resources:   Identify barriers to discharge with patient and caregiver   Identify discharge learning needs (meds, wound care, etc)     Problem: Chronic Conditions and Co-morbidities  Goal: Patient's chronic conditions and co-morbidity symptoms are monitored and maintained or improved  Outcome: Progressing  Flowsheets (Taken 6/26/2024 0035)  Care Plan - Patient's Chronic Conditions and Co-Morbidity Symptoms are Monitored and Maintained or Improved:   Monitor and assess patient's chronic conditions and comorbid symptoms for stability, deterioration, or improvement   Collaborate with multidisciplinary team to address chronic and comorbid conditions and prevent exacerbation or deterioration     Problem: Skin  Goal: Participates in plan/prevention/treatment  measures  Outcome: Progressing  Flowsheets (Taken 6/26/2024 0035)  Participates in plan/prevention/treatment measures: Elevate heels  Goal: Prevent/manage excess moisture  Outcome: Progressing  Flowsheets (Taken 6/26/2024 0035)  Prevent/manage excess moisture: Monitor for/manage infection if present     Problem: Fall/Injury  Goal: Be free from injury by end of the shift  Outcome: Progressing  Goal: Verbalize understanding of personal risk factors for fall in the hospital  Outcome: Progressing     Problem: Pain  Goal: Turns in bed with improved pain control throughout the shift  Outcome: Progressing  Goal: Participates in PT with improved pain control throughout the shift  Outcome: Progressing     Problem: Neurosensory - Adult  Goal: Achieves stable or improved neurological status  Outcome: Progressing  Goal: Absence of seizures  Outcome: Progressing     Problem: Cardiovascular - Adult  Goal: Maintains optimal cardiac output and hemodynamic stability  Outcome: Progressing  Goal: Absence of cardiac dysrhythmias or at baseline  Outcome: Progressing

## 2024-06-26 NOTE — PROGRESS NOTES
Pt reviewed in care rounds this morning. Pt not medically ready for discharge. ADOD is 48-72 hours.  Pt is independent at baseline and Pt's level of care/services at home (no services) were meeting Pt's health and safety needs prior to this current episode and hospitalizatoin. Disposition pending PT/OT evaluations, speech therapy evaluation, and Pt's progress over the next few days. SW to follow.

## 2024-06-26 NOTE — PROGRESS NOTES
Occupational Therapy    Evaluation and Treatment    Patient Name: Tierney Madrid  MRN: [id number]  Today's Date: 6/26/2024  Time Calculation  Start Time: 1057  Stop Time: 1205  Time Calculation (min): 68 min  310/310-A    Assessment  IP OT Assessment  OT Assessment: pt presents with deficits in strength, balance, activity tolerance, safety/judgement, communication affecting ability to perform ADLs at [facility].  pt typically is indep in ADLs and now requires assist for all activities.  skilled OT services required in order to return home at [facility].  End of Session Communication: Bedside nurse  End of Session Patient Position: Up in chair, Alarm on    Plan:  Treatment Interventions: ADL retraining, UE strengthening/ROM, Functional transfer training, Patient/family training, Equipment evaluation/education, Neuromuscular reeducation, Fine motor coordination activities, Endurance training  OT Frequency: 5 times per week  OT Discharge Recommendations: Moderate intensity level of continued care    Subjective     Current Problem:  1. Other chest pain  Cardiac Catheterization Procedure    Cardiac Catheterization Procedure      2. Coronary artery disease due to lipid rich plaque  Referral to Cardiac Rehab - outpatient (for providers who will be following patient along cardiac rehab)    atorvastatin (Lipitor) 40 mg tablet      3. Coronary stent restenosis, initial encounter  Referral to Cardiac Rehab - outpatient (for providers who will be following patient along cardiac rehab)    atorvastatin (Lipitor) 40 mg tablet      4. Presence of drug coated stent in coronary artery  Referral to Cardiac Rehab - outpatient (for providers who will be following patient along cardiac rehab)    atorvastatin (Lipitor) 40 mg tablet      5. 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 TIA (transient ischemic attack)  Vascular US carotid artery  duplex bilateral    Vascular US carotid artery duplex bilateral      7. Other cerebral infarction (Multi)  Vascular US carotid artery duplex bilateral    Vascular US carotid artery duplex bilateral      8. Other specified symptoms and signs involving the circulatory and respiratory systems  Vascular US carotid artery duplex bilateral          General:  General  Reason for Referral: pt scheduled for outpt cath lab procedure on 6/24/24 with change in mental status post op.  CT and MRI head negative.  differential dx of possible seizure- awaiting EEG.  pt was noted with some left hemiplegia and aphasia.  Past Medical History Relevant to Rehab: anemia, CAD, HTN, hiatal hernia, osteopenia, hyperlipedemia  Missed Visit: Yes  Missed Visit Reason: Patient placed on medical hold (bedside nurse advises to hold OT eval and pt may be more appropriate tomorrow.  Will re-attempt OT eval as pt is able.)  Co-Treatment: PT  Co-Treatment Reason: to maximize pt safety  Prior to Session Communication: Bedside nurse  Patient Position Received: Bed, 3 rail up, Alarm on  General Comment: pt with expressive aphasia    Precautions:  Medical Precautions: Fall precautions, Other (comment) (NG tube)      Pain:  Pain Assessment  Pain Assessment: 0-10  0-10 (Numeric) Pain Score: 8  Pain Location: Back (neck)    Objective     Cognition:  Overall Cognitive Status: Unable to assess (unable to fully assess due to severe expressive aphasia.  pt is able to follow commands)  Attention: Exceptions to WFL  Sustained Attention: Impaired  Problem Solving: Exceptions to WFL  Safety/Judgement: Exceptions to WFL  Routine Tasks: Minimal  Processing Speed: Delayed        Home Living:  Type of Home: House  Lives With: Spouse  Home Adaptive Equipment:  (scooter, wc, stand up walker)  Home Access: Stairs to enter with rails  Entrance Stairs-Rails: Left  Entrance Stairs-Number of Steps: 3  Bathroom Shower/Tub: Walk-in shower  Bathroom Toilet: Standard  Bathroom  Equipment: Grab bars in shower, Shower chair with back  Home Living Comments: home setup information is collected from patient's family due to expressive aphasia     Prior Function:  ADL Assistance: Independent  Homemaking Assistance: Independent  Ambulatory Assistance: Independent (with use of stand up walker)  Prior Function Comments: pt sleeps in an adjustable bed with no bed rail.  pt does not drive.  no recent falls      ADL:  Eating Assistance: Maximal  Grooming Assistance: Maximal  Bathing Assistance: Total  UE Dressing Assistance: Maximal  LE Dressing Assistance: Total  Toileting Assistance with Device: Total    Activity Tolerance:  Endurance: Decreased tolerance for upright activites    Bed Mobility/Transfers:   Bed Mobility  Bed Mobility: Yes  Bed Mobility 1  Bed Mobility 1: Supine to sitting  Level of Assistance 1: Maximum assistance  Bed Mobility Comments 1: cues for technique  Transfers  Transfer: Yes (pt unable)  Transfer 1  Technique 1: Sit to stand, Stand to sit  Transfer Device 1: Gait belt, Walker  Transfer Level of Assistance 1: Moderate assistance, Moderate verbal cues  Trials/Comments 1: cues on hand placement  Transfers 2  Transfer From 2: Bed to  Transfer to 2: Commode-standard  Technique 2: Stand pivot  Transfer Device 2: Walker, Gait belt  Transfer Level of Assistance 2: Maximum assistance, Moderate verbal cues  Trials/Comments 2: cues for hand placement and postural control    Ambulation/Gait Training:  Functional Mobility  Functional Mobility Performed: No    Sitting Balance:  Static Sitting Balance  Static Sitting-Balance Support: No upper extremity supported  Static Sitting-Level of Assistance: Minimum assistance  Static Sitting-Comment/Number of Minutes: forward, downward head.  family reports pt has severe osteoporosis and difficulties holding her head up at PLOF.    Standing Balance:  Static Standing Balance  Static Standing-Balance Support: Bilateral upper extremity supported  Static  Standing-Level of Assistance: Moderate assistance    Vision: Vision - Basic Assessment  Current Vision: Other (Comment) (NT)        Sensation:  Light Touch: Not tested    Strength:  Strength Comments: pt with AROM B UE shoulder flexion roughly 0-90 degrees. elbow wrist and hand BUE AROM WNL.   BUE strength is poor      Outcome Measures: [facility] Daily Activity  Putting on and taking off regular lower body clothing: Total  Bathing (including washing, rinsing, drying): A lot  Putting on and taking off regular upper body clothing: A lot  Toileting, which includes using toilet, bedpan or urinal: Total  Taking care of personal grooming such as brushing teeth: A lot  Eating Meals: A lot  Daily Activity - Total Score: 10         Treatment: pt participates in functional transfer training during ADLs (toileting).  Pt able to follow commands with extra time and repetition but needs cues for carry over.  Facilitation of postural and balance control during functional tasks. Tactile cues needed at times for appropriate weight shifting during ADLs.  Pt requires extra time and frequent rest periods.            EDUCATION:     Education Documentation  Body Mechanics, taught by Denia Blum OT at 6/26/2024  1:20 PM.  Learner: Patient  Readiness: Eager  Method: Demonstration, Explanation  Response: Demonstrated Understanding, Needs Reinforcement    Precautions, taught by Denia Blum OT at 6/26/2024  1:20 PM.  Learner: Patient  Readiness: Eager  Method: Demonstration, Explanation  Response: Demonstrated Understanding, Needs Reinforcement    ADL Training, taught by Denia Blum OT at 6/26/2024  1:20 PM.  Learner: Patient  Readiness: Eager  Method: Demonstration, Explanation  Response: Demonstrated Understanding, Needs Reinforcement    Education Comments  No comments found.        Goals:   Encounter Problems       Encounter Problems (Active)       ADLs       Patient will perform UB bathing  with stand by assist level of assistance.  (Progressing)       Start:  06/26/24    Expected End:  07/09/24            Patient will feed self with minimal assist  level of assistance and using PRN adaptive equipment. (Progressing)       Start:  06/26/24    Expected End:  07/09/24               BALANCE       Pt will maintain dynamic sitting balance during ADL task with stand by assist level of assistance in order to demonstrate decreased risk of falling and improved postural control. (Progressing)       Start:  06/26/24    Expected End:  07/09/24               TRANSFERS       Patient will perform bed mobility minimal assist  level of assistance in order to improve safety and independence with mobility (Progressing)       Start:  06/26/24    Expected End:  07/09/24            Patient will complete functional transfers with least restrictive device with minimal assist  level of assistance. (Progressing)       Start:  06/26/24    Expected End:  07/09/24

## 2024-06-26 NOTE — PROGRESS NOTES
Speech-Language Pathology    SLP Adult Inpatient  Speech-Language Pathology Treatment     Patient Name: Tierney Madrid  MRN: [id number]  Today's Date: 6/26/2024  Time Calculation  Start Time: 1008  Stop Time: 1114  Time Calculation (min): 66 min         Current Problem:   1. Other chest pain  Cardiac Catheterization Procedure    Cardiac Catheterization Procedure      2. Coronary artery disease due to lipid rich plaque  Referral to Cardiac Rehab - outpatient (for providers who will be following patient along cardiac rehab)    atorvastatin (Lipitor) 40 mg tablet      3. Coronary stent restenosis, initial encounter  Referral to Cardiac Rehab - outpatient (for providers who will be following patient along cardiac rehab)    atorvastatin (Lipitor) 40 mg tablet      4. Presence of drug coated stent in coronary artery  Referral to Cardiac Rehab - outpatient (for providers who will be following patient along cardiac rehab)    atorvastatin (Lipitor) 40 mg tablet      5. 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 TIA (transient ischemic attack)  Vascular US carotid artery duplex bilateral    Vascular US carotid artery duplex bilateral      7. Other cerebral infarction (Multi)  Vascular US carotid artery duplex bilateral    Vascular US carotid artery duplex bilateral      8. Other specified symptoms and signs involving the circulatory and respiratory systems  Vascular US carotid artery duplex bilateral            SLP Assessment:  Prognosis: Fair  Treatment Tolerance: Treatment limited secondary to agitation  Medical Staff Made Aware: Yes  Strengths: Family/Caregiver Support, Cognition  Barriers: None  Education Provided: Yes       Plan:  Inpatient/Swing Bed or Outpatient: Inpatient  Treatment/Interventions:  (diet tolerance/PO trials/expressive and receptive language)  SLP TX Plan: Continue Plan of Care  SLP Plan: Skilled  SLP  SLP Frequency: 3x per week  Duration: 1 week  Next Treatment Priority: diet tolernace  Discussed POC: Patient, Nursing  Discussed Risks/Benefits: Yes, Patient, Caregiver/Family, Nursing  Patient/Caregiver Agreeable: Yes      Subjective   Tierney Madrid is a 84 y.o. female was more alert today. Pt was in bed with an NG tube placed for medication. Pt suctioning secretions IND. Pt communicating more effectively with some verbalizations, a PECS board, and her phone with [employer]. Pt expressed the want to go home multiple times. Pt was emotional and began crying frequently.     Most Recent Visit:  SLP Received On: 06/26/24    General Visit Information:   Past Medical History Relevant to Rehab: anemia, CAD, HTN, hiatal hernia, osteopenia, hyperlipedemia  Total Number of Visits : 2  Prior to Session Communication: Bedside nurse      Pain Assessment:   Pain Assessment: 0-10  0-10 (Numeric) Pain Score: 8  Pain Location: Back  Pain Interventions: Medication (See MAR)      Objective   Goals:  In 1 week...  1. Patient will complete PO trials for diet upgrade with no overt s/s of aspiration or penetration in 90% of trials. IN PROGRESS - Pt upgraded to thin liquids via straw and puree diet              GOAL START:   6/25/2024                                            GOAL END:   Goals:  2. Patient will tolerate prescribed diet with no overt s/s of aspiration or penetration in 90% of trials. NEW GOAL   GOAL START: 6/26/2024    GOAL END:      Therapeutic Swallow:  Solid Diet Recommendations: Pureed/extremely thick  (IDDSI Level 4)  Liquid Diet Recommendations: Thin (IDDSI Level 0)    Daughter reported pt typically drinks from a straw and uses a spoon to eat d/t neck position d/t osteo perosis. Pt was reluctant to  complete trials d/t pain in her throat.     Pt completed oral [facility] exam:  -labial retraction: WFL  -labial extension: WFL  -lingual protrusion: reduced. Pt had surgery and has stitches under tongue  -lingual  lateralization: reduced  -buccal seal: reduced   -dentition: missing teeth     Pt completed PO trials of the following:  -thin liquids single sips via straw pt fed: No s/s of aspiration/penetration or vocal quality change in 100% of trials.   -pudding  1/4 teaspoon SLP fed: No s/s of aspiration/penetration or vocal quality change in 100% of trials.  -puree via 1/4 teaspoon SLP fed: No s/s of aspiration/penetration or vocal quality change in 100% of trials.   -soft and bite size via 1/4 teaspoon SLP fed: pt unable to masticate single piece of fruit and expelled full bolus.    Recommend thin liquids via straw single sip, puree diet, meds crushed in apple sauce, and check for pocketing.     Inpatient:  Education Documentation  Modified Diet Training, taught by Wendy Garza, SLP at 6/26/2024 12:41 PM.  Learner: Significant Other, Family, Patient  Readiness: Acceptance  Method: Explanation  Response: Verbalizes Understanding    Education Comments  No comments found.

## 2024-06-26 NOTE — PROGRESS NOTES
Tierney Madrid is a 84 y.o. female on day 2 of admission presenting with TIA (transient ischemic attack).      Subjective   Patient seen in room, bedside.  Family in room.  Patient tearful.  Frustrated of her not able to speak, has NG tube which she complains hurts her throat.  Denies any further chest pain.  No shortness of breath.  No nausea vomiting.  No bowel movements yet.  Urinating well.  Per nursing patient has been more awake and alert.  No further complaints.    Objective     Last Recorded Vitals  /72 (BP Location: Right arm, Patient Position: Lying)   Pulse 79   Temp 37.1 °C (98.7 °F) (Temporal)   Resp 18   Wt 58.1 kg (128 lb 1.4 oz)   SpO2 95%   Intake/Output last 3 Shifts:    Intake/Output Summary (Last 24 hours) at 6/26/2024 1100  Last data filed at 6/26/2024 0600  Gross per 24 hour   Intake 912.5 ml   Output 600 ml   Net 312.5 ml       Admission Weight  Weight: 58.1 kg (128 lb 1.4 oz) (06/24/24 0723)    Daily Weight  06/24/24 : 58.1 kg (128 lb 1.4 oz)      Physical Exam  Gen: NAD, awake, alert, oriented to name.  Follows simple commands.  Not in acute distress.    HEENT: Normal size, shape; EOMI intact. Sclera normal.Ears normal.Oropharynx pink and moist.  Neck: Paraspinal area on the right side of the neck tender to palpate.  No deformities noted.  No mass.  Spinal area nontender.  Neurovascularly intact.  Supple.no masses noted in neck, no lymphadenopathy, no tracheal deviation, non-palpable thyroid.No neck rigidity.  Chest: Chest wall on the left anterior chest tender to palpate, no crepitus, no deformity noted.  Chest symmetry with respirations, no wheezes, crackles  CVS: RRR, no rubs  Abd: soft, NT/ND, +BS  Ext: no Clubbing/Cyanosis/edema, non-tender.Pulse 2+. Homans Negative.  Skin: Dry, warm, good condition  Neuro: Cranial nerves II to XII grossly intact.  Grossly moves all extremities, reflexes intact.  Sensory intact.  Gait could not be assessed.  Psy: Calm, follows simple  commands.  Assessment/Plan      Principal Problem:    TIA (transient ischemic attack)  Active Problems:    Anemia    CAD (coronary artery disease)    Restless leg syndrome    Delirium    Hypertensive urgency    Other chest pain     Plan:    Morphine was stopped as patient family mentioned that she has full-blown confusion with morphine.  Will continue oxycodone home dose.  NG tube will be reevaluated today, speech reconsulted.  Patient more awake and alert.  Add lidocaine patch to left anterior chest wall, neck.  Will do aspirin and Plavix and statin through NG tube.  Other medications 2.  Resume low-dose of her home narcotics for pain.  Continue neurochecks, fall precautions.  Case was reviewed with neurology by cardiology, MRI negative.  MRA head and neck reviewed.  Echo and carotid Doppler pending.  EEG ordered.  If worsening, will start Kera for neurology.\  PT, OT.  Appreciate cardiology input.  Blood pressure stable, resume home medications through NG tube.  Resume Mirapex through her NG tube.  Labs in AM.  Reviewed MRI, carotid Doppler, CT head with the family.  Echo pending.  EEG pending.     DVT prophylaxis on Lovenox.  CODE STATUS full.  Disposition in 2 to 3 days.     Total time spent 40 minutes.    Soraya Casarez MD

## 2024-06-26 NOTE — DOCUMENTATION CLARIFICATION NOTE
"    PATIENT:               KATHIE DIETZ  ACCT #:                  [id number]  MRN:                       [id number]  :                       1940  ADMIT DATE:       2024 7:00 AM  DISCH DATE:  RESPONDING PROVIDER #:        [id number]          PROVIDER RESPONSE TEXT:    Metabolic Encephalopathy 2/2 TIA    CDI QUERY TEXT:    Clarification    Instruction:    Based on your assessment of the patient and the clinical information, please provide the requested documentation by clicking on the appropriate radio button and enter any additional information if prompted.    Question: Please further clarify the type of Encephalopathy as    When answering this query, please exercise your independent professional judgment. The fact that a question is being asked, does not imply that any particular answer is desired or expected.    The patient's clinical indicators include:  Clinical Information: 84 year old female admitted post Heart cath  to then became confused post-procedure , somnolent and unable to answer questions    Clinical Indicators: History and physical internal medicine notes \"During the procedure patient got fentanyl 125 mcg and Versed 2.5 mg for sedation. However patient was able awake partially during the procedure. Postprocedure patient was confused, was given 4 Narcan and 2 Romazicon. Postprocedure patient also had a blood sugar of 75\". follow up cardiology note documents \"TIA / Hypertensive Urgency / Encephalopathy / Seizures?\".  Progress note 24 notes \"more awake and alert, following simple commands.  Does take some time to follow the commands.  Not able to swallow.    Treatment: Admission, MRI-negative and MRA - No evidence of acute infarct, Neurology c/s.    Risk Factors: Hypertensive urgency, CAD, S/P PCI with stents implanted, fentanyl for sedation in procedure  Options provided:  -- Metabolic Encephalopathy 2/2 TIA  -- Toxic Encephalopathy 2/2 Fentanyl  -- Multifactorial Encephalopathy, " Metabolic and Toxic encephalopathy 2/2 TIA and fentanyl  -- Other - I will add my own diagnosis  -- Refer to Clinical Documentation Reviewer    Query created by: Dyana Florez on 6/26/2024 11:08 AM      Electronically signed by:  ELIZABET BARGER MD 6/26/2024 11:22 AM

## 2024-06-26 NOTE — PROGRESS NOTES
Cardiology Inpatient Progress Note  [facility] [place]    ASSESSMENT AND PLAN  Coronary artery disease  -S/P PCI to LAD and RCA (06/24/2024) / Prior stents to the RCA 12/2016, mid LAD 11/2017, RCA October 2018 in September 2019  - Left Heart Catheterization (06/24/2024):  Multivessel coronary artery disease  Prox LAD 70% in-stent restenosis; distal LAD 90% in-stent restenosis  Mid LCx 50% lesion  Patent stent to 1st Dg  Ostial / prox RCA 80% in-stent restenosis  Preserved LV systolic function  S/P Successful PCI to distal and proximal LAD using 2 drug-eluting stents (RONALD), guided by intravascular ultrasound (IVUS)  S/P Successful PCI to ostial/prox RCA using one RONALD, guided by IVUS  -RRA access site looks good, dressing clean dry and intact, no hematoma.   - Continue ASA 81mg daily, Clopidogrel 75mg daily, Atorvastatin 40mg daily, Carvedilol 12.5mg BID.  - Follow up in Cardiology clinic    TIA / Hypertensive Urgency / Encephalopathy / Seizures  - Follow Neurology recs:  Patient back from the MRI / MRA with some improvement in her symptoms, however still with L hemiplegia and difficulty for speaking. Her NIH score went up from 9 to 23.  -Case was once again discussed with the Neurology team. Per Neurology team, her diffusion is negative for stroke. She indeed has some intracranial arrow but no LVO. Intracranial stenosis of small branches, not unexpected for her age and underlying vascular disease.  -Encephalopathy and Seizures are differential diagnosis at this point. If she has persistent gaze deviation, a seizure would be in the differential. Therefore, her symptoms at this point are unexpected with her normal imaging.  - Keep ASA, Clopidogrel, High intensity statin.   - Neurology checks.   -EEG pending  -appreciate neurology recommendations.      Hypertension  - Elevated blood pressure, improved compared to yesterday. Would suggest to avoid hypotension after the  procedure.  - Would suggest to keep current medications including Amlodipine 10mg daily, Lisinopril 20mg daily, Carvedilol 12.5mg BID.  - Recommended home blood pressure monitoring.  - Goal of BP < 130/80mmHg.     Subjective  Family present at bedside. She shakes her head no to questions of if she is having chest pain or shortness of breath.     Objective:    Telemetry Reviewed  NSR    Intake & Output  Net IO Since Admission: -1,257.72 mL [06/26/24 0946]    Today's Weight:  Vitals:    06/24/24 0723   Weight: 58.1 kg (128 lb 1.4 oz)       PHYSICAL EXAM  Physical Exam  Constitutional:       Appearance: She is ill-appearing.   HENT:      Head: Normocephalic and atraumatic.      Mouth/Throat:      Mouth: Mucous membranes are dry.      Pharynx: Oropharynx is clear.   Cardiovascular:      Rate and Rhythm: Normal rate and regular rhythm.      Pulses: Normal pulses.      Heart sounds: Normal heart sounds. No murmur heard.  Pulmonary:      Effort: Pulmonary effort is normal.      Breath sounds: Normal breath sounds.   Musculoskeletal:         General: Normal range of motion.      Cervical back: Normal range of motion.   Skin:     General: Skin is warm and dry.      Capillary Refill: Capillary refill takes less than 2 seconds.   Neurological:      GCS: GCS eye subscore is 4. GCS verbal subscore is 2. GCS motor subscore is 6.      Motor: Weakness present.        Labs:     CMP:  Recent Labs     06/25/24  1018 06/25/24  0533 06/24/24  1428 06/17/24  1516 05/20/24  1624 02/13/24  0923   NA  --  137 137 136 137 138   K  --  3.9 4.0 4.5 3.6 4.2   CL  --  101 105 105 105 105   CO2  --  24 22 23 24 23   ANIONGAP  --  16 14 13 12 14   BUN  --  16 17 18 12 20   CREATININE  --  0.80 0.69 0.72 0.68 0.77   EGFR  --  73 86 83 87 77   MG 1.89  --   --   --   --  2.09     Recent Labs     06/24/24  1428 05/20/24  1624 02/13/24  0923 05/29/23  0700 03/11/22  1235 08/09/21  0809   ALBUMIN 4.2 4.1 4.1 4.0 3.9 4.1   ALKPHOS 104 101 88 120 122 74  "  ALT 8 8 10 10 6* 10   AST 23 14 19 16 13 16   BILITOT 0.5 0.4 0.3 0.6 0.5 0.4   LIPASE  --   --   --  19  --  22     CBC:  Recent Labs     06/25/24  0533 06/24/24  1428 06/17/24  1516 05/20/24  1624 02/13/24  0923   WBC 7.9 9.8 5.8 7.5 6.2   HGB 15.9 12.9 10.6* 11.3* 11.4*   HCT 47.7* 40.3 33.6* 35.8* 35.6*    282 319 283 287   MCV 91 95 96 94 94     COAG:   Recent Labs     06/17/24  1516 02/13/24  0923 11/09/22  1009   INR 1.0  --  1.0   DDIMERVTE  --  712*  --      ABO: No results for input(s): \"ABO\" in the last 54629 hours.  HEME/ENDO:  Recent Labs     06/24/24  1615 09/23/22  0920   FERRITIN  --  201*   IRONSAT  --  22*   HGBA1C 5.9*  --       CARDIAC:   Recent Labs     06/24/24  1615 05/20/24  1624 02/13/24  1015 02/13/24  0923 08/30/23  1926 08/30/23  1820   TROPHS  --  4 4 4 5 5   BNP 97 37  --   --   --   --      Recent Labs     06/24/24  1615   CHOL 275*   HDL 76.0   TRIG 140       Inpatient Medications:    Current Facility-Administered Medications:     acetaminophen (Tylenol) tablet 650 mg, 650 mg, oral, q4h PRN, Yasmeen Lutz, APRN-CNP, DNP, 650 mg at 06/24/24 1254    acetaminophen (Tylenol) tablet 975 mg, 975 mg, oral, q8h PRN, Soraya JOHN MD    amLODIPine (Norvasc) tablet 10 mg, 10 mg, nasogastric tube, Daily, Soraya JOHN MD, 10 mg at 06/26/24 0912    aspirin chewable tablet 81 mg, 81 mg, nasogastric tube, Daily, Soraya JOHN MD, 81 mg at 06/26/24 0912    atorvastatin (Lipitor) tablet 40 mg, 40 mg, nasogastric tube, Nightly, Soraya JOHN MD, 40 mg at 06/25/24 2112    carvedilol (Coreg) tablet 12.5 mg, 12.5 mg, nasogastric tube, BID, Soraya JOHN MD, 12.5 mg at 06/26/24 0919    clopidogrel (Plavix) tablet 75 mg, 75 mg, nasogastric tube, Daily, Soraya JOHN MD, 75 mg at 06/26/24 0913    D5 % and 0.9 % sodium chloride infusion, 50 mL/hr, intravenous, Continuous, Soraya JOHN MD, Last Rate: 50 mL/hr at 06/26/24 0436, 50 mL/hr at 06/26/24 0436    " dextrose 50 % injection 12.5 g, 12.5 g, intravenous, Once, LUTHER Lorenzo, DNP    enoxaparin (Lovenox) syringe 40 mg, 40 mg, subcutaneous, q24h, Soraya JOHN MD, 40 mg at 06/25/24 1737    fluticasone (Flonase) nasal spray 1 spray, 1 spray, Each Nostril, BID, Soraya JOHN MD, 1 spray at 06/26/24 0915    furosemide (Lasix) tablet 20 mg, 20 mg, nasogastric tube, Daily, Soraya JOHN MD, 20 mg at 06/26/24 0912    hydrALAZINE (Apresoline) injection 10 mg, 10 mg, intravenous, q20 min PRN **FOLLOWED BY** hydrALAZINE (Apresoline) tablet 25 mg, 25 mg, oral, q6h PRN, Soraya JOHN MD    isosorbide dinitrate (Isordil) tablet 10 mg, 10 mg, oral, TID, Soraya JOHN MD, 10 mg at 06/26/24 0912    labetaloL (Normodyne,Trandate) injection 10 mg, 10 mg, intravenous, q10 min PRN, Soraya JOHN MD    lidocaine 4 % patch 1 patch, 1 patch, transdermal, Daily, Soraya JOHN MD, 1 patch at 06/26/24 0919    lidocaine 4 % patch 1 patch, 1 patch, transdermal, Daily, Soraya JOHN MD, 1 patch at 06/26/24 0915    lisinopril tablet 20 mg, 20 mg, oral, Once, LUTHER Lorenzo, DNP    lisinopril tablet 20 mg, 20 mg, oral, Daily, Soraya JOHN MD, 20 mg at 06/26/24 0912    nitroglycerin (Nitrostat) SL tablet 0.4 mg, 0.4 mg, sublingual, q5 min PRN, LUTHER Lorenzo DNP, 0.4 mg at 06/24/24 1315    nitroglycerin (Nitrostat) SL tablet 0.4 mg, 0.4 mg, sublingual, q5 min PRN, Soraya JOHN MD    oxygen (O2) therapy, , inhalation, Continuous PRN - O2/gases, Soraya JOHN MD, 21 percent at 06/25/24 1146    [DISCONTINUED] pantoprazole (ProtoNix) EC tablet 40 mg, 40 mg, oral, Daily before breakfast **OR** pantoprazole (ProtoNix) injection 40 mg, 40 mg, intravenous, Daily before breakfast, Soraya JOHN MD, 40 mg at 06/25/24 0612    phenoL (Chloraseptic) 1.4 % mouth/throat spray 1 spray, 1 spray, Mouth/Throat, q2h PRN, Javier Rosen MD, 1 spray at 06/26/24  0437    polyethylene glycol (Glycolax, Miralax) packet 17 g, 17 g, oral, Daily PRN, Soraya JOHN MD    pramipexole (Mirapex) tablet 1 mg, 1 mg, nasogastric tube, Daily, Soraya JOHN MD, 1 mg at 06/26/24 0552    sennosides-docusate sodium (Katya-Colace) 8.6-50 mg per tablet 2 tablet, 2 tablet, nasogastric tube, BID, Soraya JOHN MD, 2 tablet at 06/26/24 0913    sertraline (Zoloft) tablet 25 mg, 25 mg, nasogastric tube, Daily, Soraya JOHN MD, 25 mg at 06/26/24 0912     VITALS  Vitals:    06/26/24 0750   BP: 145/72   Pulse: 79   Resp: 18   Temp: 37.1 °C (98.7 °F)   SpO2: 95%       Cardiology will continue to follow.     Thank you for this interesting clinical case and allowing me to participate in the care of this patient.  Please reach me out if you have any questions or if you need any clarifications regarding the patient's care.    **Disclaimer: This note was dictated by speech recognition, and every effort has been made to prevent any error in transcription, however minor errors may be present**  _________________________________________________________  Carlitos Dewitt, MSN, APRN-CNP, ACNPC-AG, CCRN  Division of Cardiovascular Medicine  [facility] [place]  [facility]

## 2024-06-26 NOTE — CONSULTS
"Nutrition Initial Assessment:   Nutrition Assessment    Reason for Assessment: Provider consult order    Patient is a 84 y.o. female presenting with TIA  6/26/24 patient and family seen - patient upset and wants the NG out.  SLP evaluated today and upgraded to pureed.  Supplements ordered due to not wanting to swallow much due to sore throat.       Nutrition History:  Energy Intake: Poor < 50 %  Food and Nutrient History: NPO x 2 days now advanced to pureed.  Had teeth extracted in Feb and has not received dentures, per daughter follows Heart healthy diet  Food Allergies/Intolerances:  None  GI Symptoms: None  Oral Problems: Swallowing difficulty, chewing     Anthropometrics:  Height: 162.6 cm (5' 4\")   Weight: 58.1 kg (128 lb 1.4 oz)   BMI (Calculated): 21.98  IBW/kg (Dietitian Calculated): 54.5 kg  Percent of IBW: 107 %       Weight History:   Daily Weight  06/24/24 : 58.1 kg (128 lb 1.4 oz)  05/29/24 : 58.5 kg (129 lb)  05/23/24 : 56.7 kg (125 lb)  05/20/24 : 59 kg (130 lb)  05/06/24 : 58.9 kg (129 lb 14.4 oz)  02/13/24 : 59 kg (130 lb)  02/05/24 : 57.7 kg (127 lb 4.8 oz)  01/24/24 : 58.2 kg (128 lb 6.4 oz)  09/26/23 : 64 kg (141 lb 1.6 oz)  08/10/23 : 64.9 kg (143 lb 1.6 oz)     Weight Change %:  Weight History / % Weight Change: Loss 15# 10% (8/10/23-1/24/24) 5 mo - has been stable since January  Significant Weight Loss: Yes  Interpretation of Weight Loss: 10% in 6 months    Nutrition Focused Physical Exam Findings:    Subcutaneous Fat Loss:   Orbital Fat Pads: Severe (dark circles, hollowing and loose skin)  Buccal Fat Pads: Severe (hollow, sunken and narrow face)  Triceps: Well nourished (ample fat tissue)  Ribs: Well nourished (chest is full, ribs do not show, slight to no protrusion of the iliac crest)  Muscle Wasting:  Temporalis: Severe (hollowed scooping depression)  Pectoralis (Clavicular Region): Mild-Moderate (some protrusion of clavicle)  Deltoid/Trapezius: Mild-Moderate (slight protrusion of " "acromion process)  Interosseous: Severe (depressed area between thumb and forefinger)  Trapezius/Infraspinatus/Supraspinatus (Scapular Region): Defer  Quadriceps: Mild-Moderate (mild depression on inner and outer thigh)  Gastrocnemius: Mild-Moderate (not well developed muscle)  Edema:  Edema: none  Nutrition Significant Labs:  CBC Trend:   Results from last 7 days   Lab Units 06/25/24  0533 06/24/24  1428   WBC AUTO x10*3/uL 7.9 9.8   RBC AUTO x10*6/uL 5.23* 4.24   HEMOGLOBIN g/dL 15.9 12.9   HEMATOCRIT % 47.7* 40.3   MCV fL 91 95   PLATELETS AUTO x10*3/uL 283 282    , Renal Lab Trend:   Results from last 7 days   Lab Units 06/25/24  1018 06/25/24  0533 06/24/24  1428   POTASSIUM mmol/L  --  3.9 4.0   PHOSPHORUS mg/dL 4.5  --   --    SODIUM mmol/L  --  137 137   MAGNESIUM mg/dL 1.89  --   --    EGFR mL/min/1.73m*2  --  73 86   BUN mg/dL  --  16 17   CREATININE mg/dL  --  0.80 0.69    , Lipid Panel:   Lab Results   Component Value Date    CHOL 275 (H) 06/24/2024    HDL 76.0 06/24/2024    CHHDL 3.6 06/24/2024    VLDL 28 06/24/2024    TRIG 140 06/24/2024    , Vit D: No results found for: \"VITD25\" , Vit B12:   Lab Results   Component Value Date    EGEXGPJP13 584 09/23/2022    , Folate: No results found for: \"FOLATE\"     Nutrition Specific Medications:  Scheduled medications  furosemide, 20 mg, nasogastric tube, Daily  isosorbide dinitrate, 10 mg, oral, TID  pantoprazole, 40 mg, intravenous, Daily before breakfast  sennosides-docusate sodium, 2 tablet, nasogastric tube, BID      Dietary Orders (From admission, onward)       Start     Ordered    06/26/24 1348  Oral nutritional supplements  Until discontinued        Comments: Vanilla - breakfast  Chocolate - lunch  Strawberry - dinner   Question Answer Comment   Deliver with Breakfast    Deliver with Lunch    Deliver with Dinner    Select supplement: Ensure Plus High Protein        06/26/24 1352    06/26/24 1205  Adult diet Regular; Pureed 4; Thin 0  Diet effective now     "    Comments: Meds crushed in AS   Question Answer Comment   Diet type Regular    Texture Pureed 4    Fluid consistency Thin 0        06/26/24 1204    06/24/24 1649  May Participate in Room Service With Assistance  Once        Question:  .  Answer:  Yes    06/24/24 1648                     Estimated Needs:   Total Energy Estimated Needs (kCal): 1569 kCal  Method for Estimating Needs: 25-30 kcal/kg = 6723-7031 kcal  Total Protein Estimated Needs (g): 70 g  Method for Estimating Needs: 1.2 gmkg  Total Fluid Estimated Needs (mL): 1569 mL  Method for Estimating Needs: 1 ml/kcal        Nutrition Diagnosis   Malnutrition Diagnosis  Patient has Malnutrition Diagnosis: Yes  Diagnosis Status: New  Malnutrition Diagnosis: Moderate malnutrition related to chronic disease or condition  As Evidenced by: 10% weight loss in 5 mo, decreased intake <75% usual due to liquid diet after teeth extraction, severe-moderate muscle atrophy and fat store loss.    Nutrition Diagnosis  Patient has Nutrition Diagnosis: Yes  Diagnosis Status (1): New  Nutrition Diagnosis 1: Swallowing difficulity  Related to (1): TIA  As Evidenced by (1): SLP evaluation       Nutrition Interventions/Recommendations         Nutrition Prescription:  Individualized Nutrition Prescription Provided for : Oral nutrition        Nutrition Interventions:   Interventions: Meals and snacks, Medical food supplement  Meals and Snacks: Texture-modified diet  Goal: Pureed diet  Medical Food Supplement: Commercial beverage, Commercial food  Goal: Magic cup x 1 = 290 kcal, 9 gm protein   Ensure plus HP x 3 = 1050 kcal, 60 gm protein    Collaboration and Referral of Nutrition Care: Collaboration by nutrition professional with other providers, Team meeting involving nutrition professional  Goal: SLP, IDT meeting, Dr Casarez    Nutrition Education:   SLP diet recommendations - will follow and provide nutritional supplements    Nutrition Monitoring and Evaluation   Food/Nutrient  Related History Monitoring  Monitoring and Evaluation Plan: Amount of food  Amount of Food: Estimated amout of food, Medical food intake  Criteria: >50% meals, >75% oral nutritional supplements    Body Composition/Growth/Weight History  Monitoring and Evaluation Plan: Weight  Weight: Measured weight  Criteria: Maintain weight    Biochemical Data, Medical Tests and Procedures  Monitoring and Evaluation Plan:  (Lipid profile)  Criteria: Improvement in cholesterol and LDL    Nutrition Focused Physical Findings  Monitoring and Evaluation Plan: Adipose, Muscles  Adipose: Loss of subcutaneous fat  Criteria: Prevent further fat store loss  Muscles: Muscle atrophy  Criteria: Prevent further muscle atrophy    Time Spent (min): 45 minutes  Denia Mazariegos RDN, LD

## 2024-06-26 NOTE — PROGRESS NOTES
Physical Therapy    Physical Therapy Evaluation    Patient Name: Tierney Madrid  MRN: [id number]  Today's Date: 6/26/2024   Time Calculation  Start Time: 1058  Stop Time: 1205  Time Calculation (min): 67 min    Assessment/Plan   Skilled PT intervention is indicated due to patient presents with significant deficits in bed mobility and transfers and currently is unable to ambulate or perform stair negotiation, is weak and has poor activity tolerance, and safety awareness.  Patient has had significant impairment in strength and mobility compared to PLOF of independence with all.  Patient is currently needing up to max assist for all mobility.    Recommend extended physical therapy intervention to improve strength, balance, mobility and reduce fall risk.  Continue to attempt ambulation with FWW as patient is able.  Patient very easily fatigued during session.  She is very anxious about people seeing her feet/legs and having to assist her when she typically is independent.  PT reassuring patient that it is important to have assistance at this time due to her weakness and immobility and her high risk of falling.      PT Assessment  PT Assessment Results: Decreased strength, Decreased range of motion, Decreased endurance, Impaired balance, Decreased mobility, Decreased safety awareness, Pain (expressive aphasia)  Rehab Prognosis: Good  Evaluation/Treatment Tolerance: Patient limited by fatigue  Medical Staff Made Aware: Yes  End of Session Communication: Bedside nurse, PCT/NA/CTA  End of Session Patient Position: Up in chair, Alarm on  IP OR SWING BED PT PLAN  Inpatient or Swing Bed: Inpatient  PT Plan  Treatment/Interventions: Bed mobility, Transfer training, Gait training, Balance training, Neuromuscular re-education, Strengthening, Endurance training, Range of motion, Therapeutic exercise, Therapeutic activity, Home exercise program, Postural re-education, Positioning  PT Plan: Ongoing PT  PT Frequency: 5 times per  "week  PT Discharge Recommendations: Moderate intensity level of continued care  Equipment Recommended upon Discharge: Wheelchair  PT Recommended Transfer Status: Assist x2    Subjective     Current Problem:  Patient Active Problem List   Diagnosis    Anemia    CAD (coronary artery disease)    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  CT and MRI head negative.  differential dx of possible seizure- awaiting EEG.  pt was noted with some left hemiplegia and aphasia.  Referred By: Leida  Past Medical History Relevant to Rehab: osteoporosis anemia, CAD, HTN, hiatal hernia, osteopenia, hyperlipedemia  Missed Visit: Yes  Missed Visit Reason: Patient placed on medical hold  Family/Caregiver Present: Yes  Caregiver Feedback: daughter answering for patinet and answering over spouse (spouse is not daughter's father);  daughter states patient has severe osteoporosis so cannot lift her head up  Co-Treatment: OT  Co-Treatment Reason: to maximize pt safety  Prior to Session Communication: Bedside nurse  Patient Position Received: Bed, 3 rail up, Alarm on  General Comment: patient awake in bed with family  present (spouse, daughter, granddaughter).  per daughter patient does not like anyone to see her feet bc of her toenails and doesn't like people to see her legs \"Is very self-conscious\".  Prior to her heart cath patient was walking and talking normally and independent with ADLs and moblity;  now she has had significant change;    Home Living:  Home Living  Type of Home: House  Lives With: Spouse  Home Adaptive " Equipment:  (scooter, wheelchair, stand up walker)  Home Access: Stairs to enter with rails  Entrance Stairs-Rails: Left  Entrance Stairs-Number of Steps: 3  Bathroom Shower/Tub: Walk-in shower  Bathroom Toilet: Standard  Bathroom Equipment: Grab bars in shower, Shower chair with back    Prior Level of Function:  Prior Function Per Pt/Caregiver Report  ADL Assistance: Independent  Homemaking Assistance: Independent  Ambulatory Assistance: Independent (stand up walker)  Prior Function Comments: pt sleeps in an adjustable bed with no bed rail.  pt does not drive.  no recent falls    Precautions:  Precautions  Medical Precautions: Fall precautions, Other (comment) (expressive aphasia; NG tube)  Precautions Comment: patient at times using suction to help clear her mouth/throat, able to spit some as well    Vital Signs:   No concerns  Objective     Pain:  Pain Assessment  Pain Assessment: 0-10  0-10 (Numeric) Pain Score: 8  Pain Location: Neck    Cognition:  Cognition  Overall Cognitive Status:  (difficult to assess due to severe expressive aphasia)  Attention: Exceptions to WFL  Safety/Judgement: Exceptions to WFL  Processing Speed: Delayed    General Assessments:  General Observation  General Observation: patient very anxious about family seeing her legs.  had spouse put her socks on but everyone else (family and therapistS) had to step out.  Patient did allow OT to help her may scrub bottoms in bed but spouse and family had to go out to do it.  Patient also having difficult with therapists present when attempting to use BSC but therapist reassured her that one had to stay present for her safety;  Patient tearful and voicing frustration.  Frequently stating home and pulling up picture of her dog on her phone.  patient c/o nausea initiatlly with session and frequently c/o sore throat.  Nurse aware of both.  PT reassured patient that she has not been eating so nausea could be from that.   Activity Tolerance  Endurance:  Decreased tolerance for upright activites     Strength  Strength Comments: patient with 3-/5 strength B LE        Postural Control  Postural Control: Impaired  Head Control: impaired; head dropped and to the right  Trunk Control: impaired, R lean noted  Static Sitting Balance  Static Sitting-Balance Support: Feet supported, Bilateral upper extremity supported  Static Sitting-Level of Assistance: Moderate assistance, Minimum assistance  Dynamic Sitting Balance  Dynamic Sitting-Balance Support: Feet supported, Bilateral upper extremity supported  Dynamic Sitting-Balance: Reaching for objects, Trunk control activities  Dynamic Sitting-Comments: up to mod A  Dynamic Standing Balance  Dynamic Standing-Balance Support: Bilateral upper extremity supported  Dynamic Standing-Balance: Turning  Dynamic Standing-Comments: maxA, leaning head onto thearpist for support    Functional Assessments:     Bed Mobility  Bed Mobility: Yes  Bed Mobility 1  Bed Mobility 1: Supine to sitting  Level of Assistance 1: Maximum assistance  Transfers  Transfer: Yes  Transfer 1  Technique 1: Sit to stand, Stand to sit  Transfer Device 1: Gait belt, Walker  Transfer Level of Assistance 1: Moderate assistance, Moderate verbal cues  Trials/Comments 1: cues for hand placement/safety (sit/stand from bed and BSC;  after use of BSC)  Transfers 2  Transfer From 2: Bed to  Transfer to 2: Commode-standard  Technique 2: Stand pivot  Transfer Device 2: Gait belt (FWW)  Transfer Level of Assistance 2: Maximum assistance, Moderate verbal cues  Trials/Comments 2: cues for hand placement and postural control  Ambulation/Gait Training  Ambulation/Gait Training Performed: No (unable)          Extremity/Trunk Assessments:   Patient was able to move her legs towards EOB while supine but unable to get them all way to EOB and L Leg weaker compared to R.  IN supported sitting she was unable to fully extend her knee actively (weakness).  PT was able to passively extend  knees to neutral.               Outcome Measures:  [facility] Basic Mobility  Turning from your back to your side while in a flat bed without using bedrails: A lot  Moving from lying on your back to sitting on the side of a flat bed without using bedrails: A lot  Moving to and from bed to chair (including a wheelchair): A lot  Standing up from a chair using your arms (e.g. wheelchair or bedside chair): A lot  To walk in hospital room: Total  Climbing 3-5 steps with railing: Total  Basic Mobility - Total Score: 10                            Goals:  Encounter Problems       Encounter Problems (Active)       PT Problem       PT Goal 1       Start:  06/26/24    Expected End:  07/09/24       Tierney Madrid will perform bed mobility for supine to and from sitting EOB with use of rail as needed with humza           PT Goal 2       Start:  06/26/24    Expected End:  07/09/24       Tierney Madrid will transfer sit to and from stand using least restrictive assistive device with humza           PT Goal 3       Start:  06/26/24    Expected End:  07/09/24       Tierney Madrid will demonstrate good safety awareness with transfers and mobility and with use of assistive device (proper hand placement).            PT Goal 4       Start:  06/26/24    Expected End:  07/09/24       Tierney Madrid will ambulate 20 ft with assistive device , level surface, good balance, steady Humza           PT Goal 5       Start:  06/26/24    Expected End:  07/09/24       Patient will be able to maintain sitting balance while completing LE there ex on her own/no LOB and keep trunk in midline while doing so            Pain - Adult            Education Documentation  -safety with transfers and use of device, including hand placement  -discussed letting staff assist her and acknowledging that she is frustrated and used to being indep but it is OK to have help right now bc she needs it.  -acknowledging patients frustration and reassuring her  -benefits of exercise and  mobility to prevent further weakness  -discussed nausea could be partly from her not eating past couple of days

## 2024-06-26 NOTE — CARE PLAN
Problem: Pain - Adult  Goal: Verbalizes/displays adequate comfort level or baseline comfort level  Outcome: Progressing     Problem: Safety - Adult  Goal: Free from fall injury  Outcome: Progressing     Problem: Discharge Planning  Goal: Discharge to home or other facility with appropriate resources  Outcome: Progressing     Problem: Chronic Conditions and Co-morbidities  Goal: Patient's chronic conditions and co-morbidity symptoms are monitored and maintained or improved  Outcome: Progressing     Problem: Skin  Goal: Participates in plan/prevention/treatment measures  Outcome: Progressing  Flowsheets (Taken 6/26/2024 1213)  Participates in plan/prevention/treatment measures: Discuss with provider PT/OT consult  Goal: Prevent/manage excess moisture  Outcome: Progressing  Flowsheets (Taken 6/26/2024 1213)  Prevent/manage excess moisture: Moisturize dry skin     Problem: Fall/Injury  Goal: Be free from injury by end of the shift  Outcome: Progressing  Goal: Verbalize understanding of personal risk factors for fall in the hospital  Outcome: Progressing     Problem: Pain  Goal: Turns in bed with improved pain control throughout the shift  Outcome: Progressing  Goal: Participates in PT with improved pain control throughout the shift  Outcome: Progressing     Problem: Neurosensory - Adult  Goal: Achieves stable or improved neurological status  Outcome: Progressing  Goal: Absence of seizures  Outcome: Progressing     Problem: Cardiovascular - Adult  Goal: Maintains optimal cardiac output and hemodynamic stability  Outcome: Progressing  Goal: Absence of cardiac dysrhythmias or at baseline  Outcome: Progressing   The patient's goals for the shift include      The clinical goals for the shift include control pain, provide rest/sleep, improve orientation, monitor hemodynamics, provide comfort/reassurance, keep safe from falls    Over the shift, the patient did not make progress toward the following goals. Barriers to  progression include . Recommendations to address these barriers include .

## 2024-06-27 LAB
APPEARANCE UR: ABNORMAL
ATRIAL RATE: 69 BPM
ATRIAL RATE: 75 BPM
BACTERIA #/AREA URNS AUTO: ABNORMAL /HPF
BILIRUB UR STRIP.AUTO-MCNC: NEGATIVE MG/DL
COLOR UR: YELLOW
GLUCOSE BLD MANUAL STRIP-MCNC: 100 MG/DL (ref 74–99)
GLUCOSE BLD MANUAL STRIP-MCNC: 101 MG/DL (ref 74–99)
GLUCOSE BLD MANUAL STRIP-MCNC: 122 MG/DL (ref 74–99)
GLUCOSE BLD MANUAL STRIP-MCNC: 98 MG/DL (ref 74–99)
GLUCOSE UR STRIP.AUTO-MCNC: ABNORMAL MG/DL
KETONES UR STRIP.AUTO-MCNC: NEGATIVE MG/DL
LEUKOCYTE ESTERASE UR QL STRIP.AUTO: ABNORMAL
MUCOUS THREADS #/AREA URNS AUTO: ABNORMAL /LPF
NITRITE UR QL STRIP.AUTO: NEGATIVE
P AXIS: 50 DEGREES
P AXIS: 54 DEGREES
P OFFSET: 187 MS
P OFFSET: 189 MS
P ONSET: 138 MS
P ONSET: 140 MS
PH UR STRIP.AUTO: 6 [PH]
PR INTERVAL: 144 MS
PR INTERVAL: 148 MS
PROT UR STRIP.AUTO-MCNC: ABNORMAL MG/DL
Q ONSET: 212 MS
Q ONSET: 212 MS
QRS COUNT: 11 BEATS
QRS COUNT: 13 BEATS
QRS DURATION: 90 MS
QRS DURATION: 92 MS
QT INTERVAL: 474 MS
QT INTERVAL: 480 MS
QTC CALCULATION(BAZETT): 514 MS
QTC CALCULATION(BAZETT): 529 MS
QTC FREDERICIA: 503 MS
QTC FREDERICIA: 510 MS
R AXIS: -46 DEGREES
R AXIS: -47 DEGREES
RBC # UR STRIP.AUTO: ABNORMAL /UL
RBC #/AREA URNS AUTO: ABNORMAL /HPF
SP GR UR STRIP.AUTO: 1.02
T AXIS: -33 DEGREES
T AXIS: -39 DEGREES
T OFFSET: 449 MS
T OFFSET: 452 MS
UROBILINOGEN UR STRIP.AUTO-MCNC: NORMAL MG/DL
VENTRICULAR RATE: 69 BPM
VENTRICULAR RATE: 75 BPM
WBC #/AREA URNS AUTO: ABNORMAL /HPF
WBC CLUMPS #/AREA URNS AUTO: ABNORMAL /HPF

## 2024-06-27 PROCEDURE — 92523 SPEECH SOUND LANG COMPREHEN: CPT | Mod: GN | Performed by: SPEECH-LANGUAGE PATHOLOGIST

## 2024-06-27 PROCEDURE — 81001 URINALYSIS AUTO W/SCOPE: CPT | Performed by: HOSPITALIST

## 2024-06-27 PROCEDURE — 2500000004 HC RX 250 GENERAL PHARMACY W/ HCPCS (ALT 636 FOR OP/ED): Performed by: HOSPITALIST

## 2024-06-27 PROCEDURE — 2500000005 HC RX 250 GENERAL PHARMACY W/O HCPCS: Performed by: HOSPITALIST

## 2024-06-27 PROCEDURE — 92507 TX SP LANG VOICE COMM INDIV: CPT | Mod: GN | Performed by: SPEECH-LANGUAGE PATHOLOGIST

## 2024-06-27 PROCEDURE — 99233 SBSQ HOSP IP/OBS HIGH 50: CPT

## 2024-06-27 PROCEDURE — 97168 OT RE-EVAL EST PLAN CARE: CPT | Mod: GO

## 2024-06-27 PROCEDURE — C9113 INJ PANTOPRAZOLE SODIUM, VIA: HCPCS | Performed by: HOSPITALIST

## 2024-06-27 PROCEDURE — 82947 ASSAY GLUCOSE BLOOD QUANT: CPT

## 2024-06-27 PROCEDURE — 94761 N-INVAS EAR/PLS OXIMETRY MLT: CPT

## 2024-06-27 PROCEDURE — 97116 GAIT TRAINING THERAPY: CPT | Mod: GP,CQ

## 2024-06-27 PROCEDURE — 2500000001 HC RX 250 WO HCPCS SELF ADMINISTERED DRUGS (ALT 637 FOR MEDICARE OP): Performed by: HOSPITALIST

## 2024-06-27 PROCEDURE — 99233 SBSQ HOSP IP/OBS HIGH 50: CPT | Performed by: HOSPITALIST

## 2024-06-27 PROCEDURE — 1200000002 HC GENERAL ROOM WITH TELEMETRY DAILY

## 2024-06-27 PROCEDURE — 2500000002 HC RX 250 W HCPCS SELF ADMINISTERED DRUGS (ALT 637 FOR MEDICARE OP, ALT 636 FOR OP/ED): Performed by: HOSPITALIST

## 2024-06-27 ASSESSMENT — PAIN - FUNCTIONAL ASSESSMENT
PAIN_FUNCTIONAL_ASSESSMENT: 0-10

## 2024-06-27 ASSESSMENT — PAIN SCALES - GENERAL
PAINLEVEL_OUTOF10: 0 - NO PAIN

## 2024-06-27 ASSESSMENT — COGNITIVE AND FUNCTIONAL STATUS - GENERAL
TOILETING: A LOT
PERSONAL GROOMING: A LOT
TURNING FROM BACK TO SIDE WHILE IN FLAT BAD: A LOT
EATING MEALS: A LITTLE
DAILY ACTIVITIY SCORE: 13
HELP NEEDED FOR BATHING: A LOT
STANDING UP FROM CHAIR USING ARMS: A LOT
DRESSING REGULAR LOWER BODY CLOTHING: A LOT
STANDING UP FROM CHAIR USING ARMS: A LITTLE
CLIMB 3 TO 5 STEPS WITH RAILING: TOTAL
DRESSING REGULAR UPPER BODY CLOTHING: A LOT
TURNING FROM BACK TO SIDE WHILE IN FLAT BAD: A LOT
MOVING TO AND FROM BED TO CHAIR: A LOT
PERSONAL GROOMING: A LOT
DAILY ACTIVITIY SCORE: 13
HELP NEEDED FOR BATHING: A LOT
MOVING FROM LYING ON BACK TO SITTING ON SIDE OF FLAT BED WITH BEDRAILS: A LOT
CLIMB 3 TO 5 STEPS WITH RAILING: TOTAL
MOBILITY SCORE: 14
DRESSING REGULAR UPPER BODY CLOTHING: A LOT
DRESSING REGULAR LOWER BODY CLOTHING: A LOT
MOVING FROM LYING ON BACK TO SITTING ON SIDE OF FLAT BED WITH BEDRAILS: A LOT
WALKING IN HOSPITAL ROOM: A LITTLE
TOILETING: A LOT
WALKING IN HOSPITAL ROOM: A LOT
EATING MEALS: A LITTLE
MOBILITY SCORE: 11
MOVING TO AND FROM BED TO CHAIR: A LITTLE

## 2024-06-27 ASSESSMENT — ACTIVITIES OF DAILY LIVING (ADL)
ADL_ASSISTANCE: INDEPENDENT
BATHING_ASSISTANCE: MAXIMAL

## 2024-06-27 NOTE — CARE PLAN
Problem: Pain - Adult  Goal: Verbalizes/displays adequate comfort level or baseline comfort level  Outcome: Progressing     Problem: Safety - Adult  Goal: Free from fall injury  Outcome: Progressing     Problem: Discharge Planning  Goal: Discharge to home or other facility with appropriate resources  Outcome: Progressing     Problem: Chronic Conditions and Co-morbidities  Goal: Patient's chronic conditions and co-morbidity symptoms are monitored and maintained or improved  Outcome: Progressing     Problem: Skin  Goal: Participates in plan/prevention/treatment measures  Outcome: Progressing  Flowsheets (Taken 6/27/2024 0825)  Participates in plan/prevention/treatment measures: Increase activity/out of bed for meals  Goal: Prevent/manage excess moisture  Outcome: Progressing  Flowsheets (Taken 6/27/2024 0825)  Prevent/manage excess moisture: Moisturize dry skin     Problem: Fall/Injury  Goal: Be free from injury by end of the shift  Outcome: Progressing  Goal: Verbalize understanding of personal risk factors for fall in the hospital  Outcome: Progressing     Problem: Pain  Goal: Turns in bed with improved pain control throughout the shift  Outcome: Progressing  Goal: Participates in PT with improved pain control throughout the shift  Outcome: Progressing     Problem: Neurosensory - Adult  Goal: Achieves stable or improved neurological status  Outcome: Progressing  Goal: Absence of seizures  Outcome: Progressing     Problem: Cardiovascular - Adult  Goal: Maintains optimal cardiac output and hemodynamic stability  Outcome: Progressing  Goal: Absence of cardiac dysrhythmias or at baseline  Outcome: Progressing     Problem: Nutrition  Goal: Oral intake greater than 50%  Outcome: Progressing  Goal: Consume prescribed supplement  Outcome: Progressing  Goal: Adequate PO fluid intake  Outcome: Progressing  Goal: Lab values WNL  Outcome: Progressing  Goal: Electrolytes WNL  Outcome: Progressing  Goal: Maintain stable  weight  Outcome: Progressing   The patient's goals for the shift include no falls    The clinical goals for the shift include pain control    Over the shift, the patient did not make progress toward the following goals. Barriers to progression include . Recommendations to address these barriers include .

## 2024-06-27 NOTE — PROGRESS NOTES
Speech-Language Pathology    SLP Adult Inpatient Speech-Language Cognition Evaluation  & Treatment    Patient Name: Tierney Madrid  MRN: [id number]  Today's Date: 6/27/2024     Time Calculation  Start Time: 0955  Stop Time: 1049  Time Calculation (min): 54 min     Evaluation Time: 40 min  Treatment Time: 14 min    Current Problem:   1. Other chest pain  Cardiac Catheterization Procedure    Cardiac Catheterization Procedure      2. Coronary artery disease due to lipid rich plaque  Referral to Cardiac Rehab - outpatient (for providers who will be following patient along cardiac rehab)    atorvastatin (Lipitor) 40 mg tablet      3. Coronary stent restenosis, initial encounter  Referral to Cardiac Rehab - outpatient (for providers who will be following patient along cardiac rehab)    atorvastatin (Lipitor) 40 mg tablet      4. Presence of drug coated stent in coronary artery  Referral to Cardiac Rehab - outpatient (for providers who will be following patient along cardiac rehab)    atorvastatin (Lipitor) 40 mg tablet      5. 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 TIA (transient ischemic attack)  Vascular US carotid artery duplex bilateral    Vascular US carotid artery duplex bilateral      7. Other cerebral infarction (Multi)  Vascular US carotid artery duplex bilateral    Vascular US carotid artery duplex bilateral      8. Other specified symptoms and signs involving the circulatory and respiratory systems  Vascular US carotid artery duplex bilateral      9. Aphasia of unknown origin [R47.01]              SLP Assessment:  SLP Assessment  SLP TX Intervention Outcome: Making Progress Towards Goals  SLP Assessment Results: Cognitive Deficits, Receptive Comprehension deficits, Expression deficits  Prognosis: Fair  Treatment Provided: Yes  Treatment Tolerance: Treatment limited secondary to agitation  Medical Staff  Made Aware: Yes  Strengths: Family/Caregiver Support  Barriers: Cognition  Education Provided: Yes      SLP Plan:  Plan  Inpatient/Swing Bed or Outpatient: Inpatient  Treatment/Interventions: Expressive Language, Receptive Language, Cognitive communication functioning, Executive functioning, Patient/family education  SLP TX Plan: Continue Plan of Care  SLP Plan: Skilled SLP  SLP Frequency: 3x per week  Duration: Current admission  SLP Discharge Recommendations: Inpatient rehab facility placement  Equipment Recommended: Consider SGD  Next Treatment Priority: see eval  Discussed POC: Patient, Caregiver/family  Discussed Risks/Benefits: Yes  Patient/Caregiver Agreeable: Yes  SLP - OK to Discharge: No    Goal(s):  Long Term Goal:  In 1 month, Tierney will exhibit adequate speech and language skills for functional communication in a variety of contexts with 75% accuracy independently.   Goal Start: 6/27/2024  Anticipated End: 7/27/2024  Goal End:     Short Term Goal(s):  In 1 week, the patient will imitate single words with 50% accuracy given minimal verbal cues.              Goal Start: 6/27/2024              Anticipated End: 7/4/2024        Goal End:     In 2 weeks, the patient will imitate 2-3 word phrases with 50% accuracy given minimal verbal cues.  Goal Start: 6/27/2024              Anticipated End: 7/11/2024     Goal End:      In 3 weeks, the patient will complete generative and confrontational naming activities with 50% accuracy given no visual aids.   Goal Start: 6/27/2024  Anticipated End: 7/18/2024  Goal End:     In 4 weeks, the patient will answer open ended questions with 75% accuracy using minimal compensatory strategies.   Goal Start: 6/27/2024  Anticipated End: 7/25/2024  Goal End:        Subjective   Current Problem:  The patient was admitted for TIA. They were seen seated upright in chair in room surrounded by family for speech-language-cognition evaluation. The patient reported significant frustration  and desire to go home. Patient was emotionally labial, crying often during assessment.     Most Recent Visit:  SLP Most Recent Visit  SLP Received On: 06/27/24      General Visit Information:  General Information  Caregiver Feedback: Per daughter and granddaughter, patient tolerating PO better today and expressing self better than previous days  Reason for Referral: impaired speech-language function  Referred By: Soraya Del Valle  Past Medical History Relevant to Rehab: TIA, CAD s/p stent placement, supraventricular tachyarrhythmia, HLD, RLS, delirium, anemia  Patient Seen During This Visit: Yes  Total Number of Visits : 3  Prior to Session Communication: Bedside nurse      Objective       Pain:  Pain Assessment  Pain Assessment: 0-10  0-10 (Numeric) Pain Score: 0 - No pain      Cognition:  Cognition  Overall Cognitive Status: Impaired  Orientation Level: Disoriented to time  Attention: Exceptions to WFL  Divided Attention: Impaired  Sustained Attention: Impaired  Memory: Unable to assess  Problem Solving: Unable to assess      Auditory Comprehension:   Auditory Comprehension  Yes/No Questions: Impaired  Basic Questions: 50% accuracy  Complex Questions: 50% accuracy  Commands: Within Functional Limits  Command Comments: 80% accuracy  Conversation: Impaired      Reading Comprehension:  Reading Comprehension  Reading Status: Unable to assess      Verbal:  Verbal Expression  Primary Mode of Expression: Verbal  Primary Language: English  Confrontation Naming: Impaired  Confrontation Naming Comments: 20% accuracy  Repetition: Impaired  Repetition Comments: 0% accuracy  Sentence Completion: Impaired  Impaired Sentence Comments: 20% accuracy  Open Ended Questions: Impaired  Conversation: Impaired  Impaired Conversation: Dysfluencies, oral groping, emotionally labial  Affect: Impaired  Prosody: Impaired  Eye Contact: Impaired  Topic Initiation: Impaired  Topic Maintenance: Impaired  Turn Taking: Impaired  Verbal  Communication Comments: Patient demonstrating adequate utilization of low tech AAC      SLP Outcome Measures:  SLP Adult Other Outcome Measures  [place] Aphasia Screening Test - MAST: See Below    The [place] Aphasia Screening Test (MAST) was administered to assess the patient's expressive and receptive language skills. The patient demonstrated the following scores:    Expressive Index  Namin/10  Automatic Speech: 2/10  Repetition: 0/10  Spellin/10  Verbal Fluency: 0/10    Expressive Sub-scale: 4/50 - 8% accuracy    Receptive Index  Yes/No Accuracy: 10/20  Object Recognition: 8/10  Following instructions: 8/10  Reading Instructions: *patient attempted but was unable to read d/t double vision    Receptive Sub-scale: 26/40 - 65% accuracy    Total Score: 30/90 - 33% accuracy     Speech-Language-Cognition Treatment:  SLP provided education to patient and family regarding observed difficulty with word production and oral groping, word finding difficulties, and errors in responses - potentially indicating aphasia and apraxia. SLP provided education to family regarding next level of care recommendations and options.     SLP provided family with expressive vocabulary exercises for synonyms, antonyms, and definitions given multiple choices and phrase completion for completion with patient. Family verbalized understanding.     Inpatient:  Education Documentation  Communication, taught by CYRUS Singh at 2024 12:04 PM.  Learner: Significant Other, Family, Patient  Readiness: Acceptance  Method: Explanation  Response: Demonstrated Understanding, Needs Reinforcement    Speech/Language, taught by CYRUS Singh at 2024 12:04 PM.  Learner: Significant Other, Family, Patient  Readiness: Acceptance  Method: Explanation  Response: Demonstrated Understanding, Needs Reinforcement    Cognition, taught by CYRUS Singh at 2024 12:04 PM.  Learner: Significant Other, Family,  Patient  Readiness: Acceptance  Method: Explanation  Response: Demonstrated Understanding, Needs Reinforcement    Education Comments  No comments found.

## 2024-06-27 NOTE — PROGRESS NOTES
Subjective Data:  Patient examined at bedside with spouse present. Patient is able to shake her head yes or no to questions. Spouse reports patient wants to go home. Patient shakes her head no to any chest pain, SOB, palpitations, dizziness, or generalized pain.      Objective Data:  Last Recorded Vitals:  Vitals:    06/26/24 1929 06/26/24 2000 06/27/24 0655 06/27/24 0747   BP: 112/65   129/66   BP Location: Left arm   Left arm   Patient Position: Lying   Lying   Pulse: 79   70   Resp:  18  18   Temp:  36.5 °C (97.7 °F)  36.7 °C (98.1 °F)   TempSrc:    Temporal   SpO2:  95% 94% 95%   Weight:       Height:           Last Labs:  CBC - 6/26/2024:  7:20 PM  8.0 10.1 264    32.3      CMP - 6/26/2024:  7:20 PM  8.0 6.0 22 --- 0.5   4.5 3.2 9 75      PTT - No results in last year.  1.0   11.9 _     TROPHS   Date/Time Value Ref Range Status   06/26/2024 07:20  0 - 13 ng/L Final   05/20/2024 04:24 PM 4 0 - 13 ng/L Final   02/13/2024 10:15 AM 4 0 - 13 ng/L Final     BNP   Date/Time Value Ref Range Status   06/24/2024 04:15 PM 97 0 - 99 pg/mL Final   05/20/2024 04:24 PM 37 0 - 99 pg/mL Final     HGBA1C   Date/Time Value Ref Range Status   06/24/2024 04:15 PM 5.9 see below % Final     LDLCALC   Date/Time Value Ref Range Status   06/24/2024 04:15  <=99 mg/dL Final     Comment:                                 Near   Borderline      AGE      Desirable  Optimal    High     High     Very High     0-19 Y     0 - 109     ---    110-129   >/= 130     ----    20-24 Y     0 - 119     ---    120-159   >/= 160     ----      >24 Y     0 -  99   100-129  130-159   160-189     >/=190       VLDL   Date/Time Value Ref Range Status   06/24/2024 04:15 PM 28 0 - 40 mg/dL Final      Last I/O:  I/O last 3 completed shifts:  In: 1567.5 (27 mL/kg) [P.O.:240; I.V.:1137.5 (19.6 mL/kg); NG/GT:190]  Out: 1000 (17.2 mL/kg) [Urine:1000 (0.5 mL/kg/hr)]  Weight: 58.1 kg     Past Cardiology Tests (Last 3 Years):  EKG:  ECG 12 Lead 06/26/2024  "(Preliminary)      ECG 12 Lead 06/26/2024 (Preliminary)      Electrocardiogram 12 Lead 06/24/2024      ECG 12 lead STAT 06/24/2024      ECG 12 lead 05/20/2024      ECG 12 lead 02/13/2024      ECG 12 lead 02/13/2024    Echo:  No results found for this or any previous visit from the past 1095 days.    Ejection Fractions:  No results found for: \"EF\"  Cath:  Cardiac Catheterization Procedure 06/24/2024    Stress Test:  No results found for this or any previous visit from the past 1095 days.    Cardiac Imaging:  No results found for this or any previous visit from the past 1095 days.      Inpatient Medications:  Scheduled medications   Medication Dose Route Frequency    amLODIPine  10 mg nasogastric tube Daily    aspirin  81 mg nasogastric tube Daily    atorvastatin  40 mg nasogastric tube Nightly    carvedilol  12.5 mg nasogastric tube BID    clopidogrel  75 mg nasogastric tube Daily    dextrose  12.5 g intravenous Once    enoxaparin  40 mg subcutaneous q24h    fluticasone  1 spray Each Nostril BID    furosemide  20 mg nasogastric tube Daily    isosorbide dinitrate  10 mg oral TID    lidocaine  1 patch transdermal Daily    lidocaine  1 patch transdermal Daily    lisinopril  20 mg oral Once    lisinopril  20 mg oral Daily    pantoprazole  40 mg intravenous Daily before breakfast    pramipexole  1 mg nasogastric tube Nightly    sennosides-docusate sodium  2 tablet nasogastric tube BID    sertraline  25 mg nasogastric tube Daily     PRN medications   Medication    acetaminophen    acetaminophen    hydrALAZINE    nitroglycerin    nitroglycerin    ondansetron    oxyCODONE    oxygen    phenoL    polyethylene glycol     Continuous Medications   Medication Dose Last Rate    potassium chloride in 0.9%NaCl  75 mL/hr 75 mL/hr (06/26/24 2308)       Physical Exam:  General: no acute distress  Skin: Skin is warm, dry and intact without rashes or lesions.   HEENT: normocephalic, atraumatic; conjunctivae are clear without exudates or " hemorrhage. Sclera is non-icteric. Eyelids are normal in appearance without swelling or lesions. Hearing intact. Nares are patent bilaterally. Moist mucous membranes.   Cardiovascular: heart rate and rhythm are normal. No murmurs, gallops, or rubs are auscultated. S1 and S2 are heard and are of normal intensity. No JVD, no carotid bruits  Respiratory: bilateral lung sounds clear to auscultations without rales, rhonchi, or wheezes. No accessory muscle use or stridor  Musculoskeletal: no deformities  Extremities:  no swelling or erythema  Neurological: hand grasp equal bilaterally; foot push/pull is equal bilaterally, but weak; face is symmetrical; aphasia present  Psychiatric: appropriate mood and affect; good judgment and insight       Assessment/Plan     Coronary artery disease  -S/P PCI to LAD and RCA (06/24/2024) / Prior stents to the RCA 12/2016, mid LAD 11/2017, RCA October 2018 in September 2019  - Left Heart Catheterization (06/24/2024):  Multivessel coronary artery disease  Prox LAD 70% in-stent restenosis; distal LAD 90% in-stent restenosis  Mid LCx 50% lesion  Patent stent to 1st Dg  Ostial / prox RCA 80% in-stent restenosis  Preserved LV systolic function  S/P Successful PCI to distal and proximal LAD using 2 drug-eluting stents (RONALD), guided by intravascular ultrasound (IVUS)  S/P Successful PCI to ostial/prox RCA using one RONALD, guided by IVUS  -RRA access site looks good, dressing clean dry and intact, no hematoma.   - Continue ASA 81mg daily, Clopidogrel 75mg daily, Atorvastatin 40mg daily, Carvedilol 12.5mg BID.  - Patient has appt with Carlitos Dewitt CNP, on 7/2/24     TIA / Hypertensive Urgency / Encephalopathy / Seizures  -Carotid artery stenosis as been ruled out as etiology  -Aphasia present  - Keep ASA, Clopidogrel, High intensity statin.   - Neurology checks.   -EEG pending  -appreciate neurology recommendations.       Hypertension  - Elevated blood pressure, improved compared to yesterday.  Would suggest to avoid hypotension after the procedure.  - Would suggest to keep current medications including Amlodipine 10mg daily, Lisinopril 20mg daily, Carvedilol 12.5mg BID.  - Recommended home blood pressure monitoring.  - Goal of BP < 130/80mmHg.       Cardiology will sign off; will consult palliative care  Peripheral IV 06/27/24 20 G Right;Anterior Forearm (Active)   Site Assessment Dry;Intact 06/27/24 0030   Dressing Type Transparent 06/27/24 0030   Line Status Flushed;Infusing 06/27/24 0030   Dressing Status Clean;Dry 06/27/24 0030   Number of days: 0       Code Status:  Full Code    Thank you for allowing me to participate in the care of this patient. Please reach me out if you have any questions or if you need any clarifications regarding the patient's care.          Yasmeen Lutz, APRN-CNP, DNP

## 2024-06-27 NOTE — SIGNIFICANT EVENT
Cardiology    Patient is feeling much better today. She still has some slurred speech, but is able to communicate well. She wants to go home. She has recovered from the L hemiplegia, with preserved strength in both arms and legs. She is more calm and more comfortable. Working with physical therapy team and occupational therapy with good improvement compared to the previous days.    We will keep following the patient. Family communicated and verbalizes being agreeable with the plan.

## 2024-06-27 NOTE — PROGRESS NOTES
Tierney Madrid is a 84 y.o. female on day 3 of admission presenting with TIA (transient ischemic attack).      Subjective   Patient seen in room, in chair.  Family in room.  Patient is more awake and alert and oriented x 2.  Following all commands.  Fine to talk cannot complete the word.  Denies any further chest pain.  No shortness of breath.  No nausea vomiting.  No bowel movements yet.  Urinating well.  Patient had an episode of passing out when she was on commode and trying to have a bowel movement.  Was very brief less than 5 minutes, neurologically intact after they were event.  Was seen by hospitalist at night.  Discussed with the family.  No further complications through the night.  Objective     Last Recorded Vitals  /62 (BP Location: Left arm, Patient Position: Sitting)   Pulse 71   Temp 36.3 °C (97.3 °F) (Temporal)   Resp 16   Wt 58.1 kg (128 lb 1.4 oz)   SpO2 92%   Intake/Output last 3 Shifts:    Intake/Output Summary (Last 24 hours) at 6/27/2024 1146  Last data filed at 6/27/2024 0952  Gross per 24 hour   Intake 1809.99 ml   Output 650 ml   Net 1159.99 ml       Admission Weight  Weight: 58.1 kg (128 lb 1.4 oz) (06/24/24 0723)    Daily Weight  06/24/24 : 58.1 kg (128 lb 1.4 oz)    Image Results  ECG 12 Lead  Normal sinus rhythm with sinus arrhythmia  Left anterior fascicular block  Minimal voltage criteria for LVH, may be normal variant  Cannot rule out Inferior infarct (masked by fascicular block?) , age undetermined  Anterior infarct (cited on or before 24-JUN-2024)  ST & T wave abnormality, consider lateral ischemia  Prolonged QT  Abnormal ECG  When compared with ECG of 26-JUN-2024 19:11, (unconfirmed)  Premature ventricular complexes are no longer Present  Confirmed by Josr Damico ([id number]) on 6/27/2024 9:01:04 AM  ECG 12 Lead  Sinus rhythm with occasional Premature ventricular complexes  Left axis deviation  Minimal voltage criteria for LVH, may be normal variant  Anterior infarct (cited on  or before 24-JUN-2024)  T wave abnormality, consider inferolateral ischemia  Prolonged QT  Abnormal ECG  When compared with ECG of 24-JUN-2024 11:50,  Premature ventricular complexes are now Present  Serial changes of evolving Anterior infarct Present  Confirmed by Josr Damico ([id number]) on 6/27/2024 9:01:14 AM      Physical Exam  Gen: NAD, awake, alert, oriented to name.  Follows all commands.  HEENT: Normal size, shape; EOMI intact. Sclera normal.Ears normal.Oropharynx pink and moist.  Neck: Paraspinal area on the right side of the neck tender to palpate.  No deformities noted.  No mass.  Spinal area nontender.  Neurovascularly intact.  Supple.no masses noted in neck, no lymphadenopathy, no tracheal deviation, non-palpable thyroid.No neck rigidity.  Chest: Chest wall on the left anterior chest tender to palpate, no crepitus, no deformity noted.  Chest symmetry with respirations, no wheezes, crackles  CVS: RRR, no rubs  Abd: soft, NT/ND, +BS  Ext: no Clubbing/Cyanosis/edema, non-tender.Pulse 2+. Homans Negative.  Skin: Dry, warm, good condition  Neuro: Cranial nerves II to XII grossly intact.  Grossly moves all extremities, reflexes intact.  Sensory intact.  Ambulating with a walker.    Psy: Calm, anxious at times.  Follows all commands.    Assessment/Plan      Principal Problem:    TIA (transient ischemic attack)  Active Problems:    Anemia    CAD (coronary artery disease)    Restless leg syndrome    Delirium    Hypertensive urgency    Other chest pain  Generalized weakness.  Aphasia.  Dysphagia.    Plan    Patient has significant aphasia and dysphagia.  No other neurological changes.  Mentation wise she is improving slowly.  Had a long discussion with the family.  I have also discussed the case with neurologist Dr. Squires this morning.  Waiting for EEG.  Possible repeat MRI however I have discussed the benefits with the family and the risk of her having another dose of benzodiazepine with her age and delirium.  At  this point family decided no further MRIs.  No benzos.  Will continue with aspirin and statin.  Continue with delirium management and wait for EEGs if could be done.  Also discussed about her improvement with rehab, continue to pursue aggressive rehab.   helping out to see if she kind of agrees for inpatient acute rehab for possible stroke, or home with aggressive rehab.  Family agreeable with above plan.  Check urine analysis.  Try to hold benzodiazepines.  On Zoloft to be restarted.  Restarted low-dose of oxycodone for chronic pain.  Blood pressure stable.  On aspirin, Plavix, beta-blocker and statin for her recent coronary artery disease, s/p 3 stents.  No chest pain currently.  PT and OT to continue.  Check electrolytes.    DVT prophylaxis on Lovenox.  CODE STATUS full.  Disposition in 1 to 2 days.    Total time spent 45 minutes.      Malnutrition Diagnosis Status: New  Malnutrition Diagnosis: Moderate malnutrition related to chronic disease or condition  As Evidenced by: 10% weight loss in 5 mo, decreased intake <75% usual due to liquid diet after teeth extraction, severe-moderate muscle atrophy and fat store loss.  I agree with the dietitian's malnutrition diagnosis.         Soraya Casarez MD

## 2024-06-27 NOTE — PROGRESS NOTES
Pt reviewed during Care Rounds today and she is nearing discharge.  Dr. Casarez reports that family is asking about Acute Rehab.  SW met with pt and several family members today.  Pt is having a hard time communicating verbally, but is still alert and oriented and provides permission to speak with her two daughters who are the spokespeople for pt/family today. Pt did work with therapy today and should consider Acute Rehab or SNF/TCU at discharge.  Initially pt was very opposed to going anywhere, but home, but finally consented to referrals being made for both Acute Rehab and TCU. Pt adamantly does not want a SNF.  She and spouse reside in [place], [place] so [place] or [place] would be ideal.  They are also agreeable to [place] or [place].  Referrals attached in [facility]Port for [facility] and TCU, [facility] TCU, [facility] Acute Rehab, and [facility]; awaiting responses.  Pt/family educated to the precert process and verbalized understanding.  Precert will be required. Care Transitions will continue to follow.       VANI Caraballo

## 2024-06-27 NOTE — PROGRESS NOTES
Physical Therapy    Physical Therapy Treatment    Patient Name: Tierney Madrid  MRN: [id number]  Today's Date: 6/27/2024  Time Calculation  Start Time: 1053  Stop Time: 1120  Time Calculation (min): 27 min     310/310-A    Assessment/Plan   PT Assessment  PT Assessment Results: Decreased strength, Decreased range of motion, Decreased endurance, Impaired balance, Decreased mobility, Decreased safety awareness, Pain (expressive aphasia)  Rehab Prognosis: Good  Evaluation/Treatment Tolerance: Patient limited by fatigue  Medical Staff Made Aware: Yes  End of Session Communication:  (student nurse, came to take vitals)  Assessment Comment: Pt. sitting up in recliner upon arrival, agreeable to therapy.  CGA/min assist x1 with STS transfers, bed mobility and gait.  Pt. has her rollator here now, pt. walked 80ft with two standing rest breaks.  Pt. starting to become fatigued with gait.  Pt. started seated exercises in chair but states she needs to use the bathroom.  Pt. then returned to bed which patient got into bed fairly well  with minimal assistance needed. Continue with POC and progress per tolerance to improve endurance and ease with mobility/transfers.  MB  End of Session Patient Position: Bed, 3 rail up, Alarm off, caregiver present (Student nurse with patient, taking her vitals at end of session.)  PT Plan  Inpatient/Swing Bed or Outpatient: Inpatient  PT Plan  Treatment/Interventions: Bed mobility, Transfer training, Gait training, Balance training, Neuromuscular re-education, Strengthening, Endurance training, Range of motion, Therapeutic exercise, Therapeutic activity, Home exercise program, Postural re-education, Positioning  PT Plan: Ongoing PT  PT Frequency: 5 times per week  PT Discharge Recommendations: Moderate intensity level of continued care  Equipment Recommended upon Discharge: Wheelchair  PT Recommended Transfer Status: Assist x2    Current Problem:  Patient Active Problem List   Diagnosis    Anemia     CAD (coronary artery disease)    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    Hernia, hiatal    Hypertension    Osteopenia    Restless leg syndrome    Stiffness of cervical spine    Bilateral edema of lower extremity    Other chest pain    Delirium    Hypertensive urgency    TIA (transient ischemic attack)       General Visit Information:   PT  Visit  PT Received On: 06/27/24  General  Family/Caregiver Present: Yes  Co-Treatment: OT  Co-Treatment Reason: to maximize patient safety during session  Patient Position Received: Up in chair, Alarm on  Subjective     Precautions:  Precautions  Medical Precautions: Fall precautions, Other (comment) (expressive aphasia; NG tube)  Precautions Comment: patient at times using suction to help clear her mouth/throat, able to spit some as well    Vital Signs:  Vital Signs  SpO2: 94 %  Objective     Pain:  Pain Assessment  Pain Assessment: 0-10  0-10 (Numeric) Pain Score: 0 - No pain    Cognition:  Cognition  Orientation Level: Disoriented to time    Postural Control:       Extremity/Trunk Assessments:                Activity Tolerance:  Activity Tolerance  Endurance: Tolerates 10 - 20 min exercise with multiple rests    Treatments:  Therapeutic Exercise  Therapeutic Exercise Performed: Yes  Therapeutic Exercise Activity 1: LAQ's x10 ea        Bed Mobility  Bed Mobility: Yes  Bed Mobility 1  Bed Mobility 1: Sitting to supine  Level of Assistance 1: Contact guard, Minimum assistance  Ambulation/Gait Training  Ambulation/Gait Training Performed: Yes  Ambulation/Gait Training 1  Surface 1: Level tile  Device 1: Rollator  Gait Support Devices: Gait belt  Assistance 1: Contact guard, Minimum assistance  Quality of Gait 1: Decreased step length  Comments/Distance (ft) 1: 80ft.  Transfers  Transfer: Yes  Transfer 1  Transfer From 1: Sit to, Stand to  Transfer to 1: Stand, Sit  Technique 1: Sit to stand, Stand to sit  Transfer Device 1: Gait belt, Walker  Transfer Level of  Assistance 1: Contact guard, Minimum assistance          Outcome Measures:  [facility] Basic Mobility  Turning from your back to your side while in a flat bed without using bedrails: A lot  Moving from lying on your back to sitting on the side of a flat bed without using bedrails: A lot  Moving to and from bed to chair (including a wheelchair): A little  Standing up from a chair using your arms (e.g. wheelchair or bedside chair): A little  To walk in hospital room: A little  Climbing 3-5 steps with railing: Total  Basic Mobility - Total Score: 14  Education Documentation  Mobility Training, taught by Anisha Fairbanks PTA at 6/27/2024 11:39 AM.  Learner: Patient  Readiness: Acceptance  Method: Explanation  Response: Verbalizes Understanding, Needs Reinforcement    Education Comments  No comments found.        EDUCATION:     Encounter Problems       Encounter Problems (Active)       PT Problem       PT Goal 1       Start:  06/26/24    Expected End:  07/09/24       Tierney Madrid will perform bed mobility for supine to and from sitting EOB with use of rail as needed with humza           PT Goal 2       Start:  06/26/24    Expected End:  07/09/24       Tierney Madrid will transfer sit to and from stand using least restrictive assistive device with humza           PT Goal 3       Start:  06/26/24    Expected End:  07/09/24       Tierney Madrid will demonstrate good safety awareness with transfers and mobility and with use of assistive device (proper hand placement).            PT Goal 4       Start:  06/26/24    Expected End:  07/09/24       Tierney Madrid will ambulate 20 ft with assistive device , level surface, good balance, steady Humza           PT Goal 5       Start:  06/26/24    Expected End:  07/09/24       Patient will be able to maintain sitting balance while completing LE there ex on her own/no LOB and keep trunk in midline while doing so            Pain - Adult

## 2024-06-27 NOTE — SIGNIFICANT EVENT
At around 7:05 PM, rapid response was called.  When I arrived, patient was sitting on the bedside commode.  Very diaphoretic.  Minimally responsive.  Pale.  Vital signs showed a blood pressure of around 82/51.  Patient was running fluids D5 percent normal saline so I bolused the bag.  I had the patient placed on the bed when in Trendelenburg position.  Around 5 minutes later, patient started regaining her consciousness.  Vital signs normalized.  Blood pressure became 126/68.  Patient regained her consciousness.  Following verbal meds.  Moving extremities.  Reflexes intact.  Did EKG, it showed T wave inversions in the precordial leads.  Patient herself was still mumbling and according to the staff at the bedside this has been her baseline throughout the day.  And overall, her cognitive status is improved from yesterday.  I ordered lactic acid, CBC CMP and troponin level.  Pertinent findings on workup showed a glucose of 228, potassium 3.4 and protein 6.0.  Troponin 886.  Hemoglobin 10.1.  I ordered ABG and it showed a pCO2 of 36, pO2 of 77 and pH of 7.36.  So I went ahead and changed IV fluids to normal saline with potassium and at a rate of 75 cc/hour.  I then reviewed the EKG findings and the troponin level with our cardiologist.  Cardiologist was not concerned much about those findings as these are not uncommon after a cardiac catheterization and stent placement procedure.  We both agreed that patient had most likely an orthostatic hypotension/vasovagal while straining to have a bowel movement.  Regarding her neurological status, neurology has been following.  Brain MRI was done yesterday.  And it has been agreed so far the patient most likely had an encephalopathy either secondary to the procedure itself or the medications she received during the cardiac catheterization.  I then had a lengthy discussion with the family at bedside.  I tried my best to explain to them my clinical findings and my clinical input.   Family had questions regarding her cognitive status especially the mumbling of her words.  I did explain to them that an ischemic cerebral event remains a possibility but patient is already optimized with the medical therapy.  And in my opinion, there is nothing more that can we do at this time other than wait.  I did share with him the information noted by the neurologist who saw patient by telehealth as well as by the cardiology team during the day.  I even did offer family transfer the patient to a tertiary care center to be seen by neurology in person.  Family asked me if that would make much of a difference in the outcome or treatment plan and I told him that I do not think so.  The only thing that has not been done yet is the EEG but in my clinical judgment I do not think patient is having an ongoing seizure activity; not something that explains her current mumbling.  So we will continue to monitor closely.    I have reviewed and evaluated the most recent data and results, personally examined the patient, and formulated the plan of care as presented above.  Patient is at-risk for clinically significant deterioration / failure due to the above mentioned dysfunctional, unstable organ systems and requires continued critical care treatment. I have personally identified and managed all complex critical care issues to prevent aforementioned clinical deterioration.      60 minutes were spent in the critical care management of the patient excluding billable procedures

## 2024-06-27 NOTE — PROGRESS NOTES
Occupational Therapy    Re- Evaluation    Patient Name: Tierney Madrid  MRN: [id number]  Today's Date: 6/27/2024  Time Calculation  Start Time: 1052  Stop Time: 1107  Time Calculation (min): 15 min  310/310-A    Assessment  IP OT Assessment  OT Assessment: pt has made significant gains since yesterdays initial evaluation yet is still requiring assist for all ADLs.  Continue with current OT POC.  OT re-evaluation warranted due to significant improvement in cognition and tolerance to task.  Pt able to tolerate higher intensity of therapy services.    End of Session Communication: Bedside nurse  End of Session Patient Position: Up in chair, Alarm on    Plan:  Treatment Interventions: ADL retraining, UE strengthening/ROM, Functional transfer training, Patient/family training, Equipment evaluation/education, Neuromuscular reeducation, Fine motor coordination activities, Endurance training  OT Frequency: 5 times per week  OT Discharge Recommendations: High intensity level of continued care    Subjective     Current Problem:  1. Other chest pain  Cardiac Catheterization Procedure    Cardiac Catheterization Procedure      2. Coronary artery disease due to lipid rich plaque  Referral to Cardiac Rehab - outpatient (for providers who will be following patient along cardiac rehab)    atorvastatin (Lipitor) 40 mg tablet      3. Coronary stent restenosis, initial encounter  Referral to Cardiac Rehab - outpatient (for providers who will be following patient along cardiac rehab)    atorvastatin (Lipitor) 40 mg tablet      4. Presence of drug coated stent in coronary artery  Referral to Cardiac Rehab - outpatient (for providers who will be following patient along cardiac rehab)    atorvastatin (Lipitor) 40 mg tablet      5. Atherosclerotic heart disease of native coronary artery with angina pectoris with documented spasm (CMS-[facility])  Cardiac Catheterization Procedure      6. TIA (transient ischemic attack)  Vascular US carotid artery  duplex bilateral    Vascular US carotid artery duplex bilateral      7. Other cerebral infarction (Multi)  Vascular US carotid artery duplex bilateral    Vascular US carotid artery duplex bilateral      8. Other specified symptoms and signs involving the circulatory and respiratory systems  Vascular US carotid artery duplex bilateral      9. Aphasia of unknown origin [R47.01]            General:  General  Reason for Referral: impaired mobility;pt scheduled for outpt cath lab procedure on 6/24/24 with change in mental status post op.  CT and MRI head negative.  differential dx of possible seizure- awaiting EEG.  pt was noted with some left hemiplegia and aphasia.  Referred By: Soraya Del Valle  Past Medical History Relevant to Rehab: TIA, CAD s/p stent placement, supraventricular tachyarrhythmia, HLD, RLS, delirium, anemia  Missed Visit: Yes  Missed Visit Reason: Patient placed on medical hold (bedside nurse advises to hold OT eval and pt may be more appropriate tomorrow.  Will re-attempt OT eval as pt is able.)  Family/Caregiver Present: Yes (multiple family members)  Co-Treatment: PT  Co-Treatment Reason: to maximize pt safety  Prior to Session Communication: Bedside nurse, Care Coordinator  Patient Position Received: Bed, 3 rail up, Alarm on  General Comment: pt more alert with significatly improved activity tolerance.  pt continues to struggle with severe expressive aphasia yet following all commands WNL    Precautions:  Medical Precautions: Fall precautions    Pain:  Pain Assessment  Pain Assessment: 0-10  0-10 (Numeric) Pain Score: 0 - No pain  Pain Location: Back (neck)    Objective     Cognition:  Overall Cognitive Status: Unable to assess (unable to fully assess due to severe expressive aphasia.  pt is able to follow commands)  Attention: Exceptions to WFL  Divided Attention: Impaired  Sustained Attention: Impaired  Problem Solving: Exceptions to WFL  Safety/Judgement: Exceptions to WFL  Routine Tasks:  Minimal  Insight: Moderate  Processing Speed: Within funtional limits       Home Living:  Type of Home: House  Lives With: Spouse  Home Adaptive Equipment:  (scooter, asif, stand up walker)  Home Access: Stairs to enter with rails  Entrance Stairs-Rails: Left  Entrance Stairs-Number of Steps: 3  Bathroom Shower/Tub: Walk-in shower  Bathroom Toilet: Standard  Bathroom Equipment: Grab bars in shower, Shower chair with back  Home Living Comments: home setup information is collected from patient's family due to expressive aphasia     Prior Function:  ADL Assistance: Independent  Homemaking Assistance: Independent  Ambulatory Assistance: Independent (stand up walker)  Prior Function Comments: pt sleeps in an adjustable bed with no bed rail.  pt does not drive.  no recent falls    ADL:  Eating Assistance: Moderate  Grooming Assistance: Minimal  Bathing Assistance: Maximal  UE Dressing Assistance: Moderate  LE Dressing Assistance: Moderate  Toileting Assistance with Device: Moderate    Activity Tolerance:  Endurance: Tolerates 10 - 20 min exercise with multiple rests    Bed Mobility/Transfers:   Bed Mobility  Bed Mobility: No  Bed Mobility 1  Bed Mobility 1: Supine to sitting  Level of Assistance 1: Maximum assistance  Bed Mobility Comments 1: cues for technique  Transfers  Transfer: Yes  Transfer 1  Technique 1: Sit to stand, Stand to sit  Transfer Device 1: Gait belt, Walker  Transfer Level of Assistance 1: Contact guard, Moderate verbal cues (cues for hand placement)  Trials/Comments 1: cues on hand placement  Transfers 2  Transfer From 2: Bed to  Transfer to 2: Commode-standard  Technique 2: Stand pivot  Transfer Device 2: Walker, Gait belt  Transfer Level of Assistance 2: Maximum assistance, Moderate verbal cues  Trials/Comments 2: cues for hand placement and postural control    Ambulation/Gait Training:  Functional Mobility  Functional Mobility Performed: Yes  Functional Mobility 1  Surface 1: Level tile  Device 1:   (stand up walker)  Functional Mobility Support Devices: Gait belt  Assistance 1: Contact guard, Minimum assistance    Sitting Balance:  Static Sitting Balance  Static Sitting-Balance Support: No upper extremity supported  Static Sitting-Level of Assistance: Minimum assistance  Static Sitting-Comment/Number of Minutes: forward, downward head.  family reports pt has severe osteoporosis and difficulties holding her head up at PLOF.    Standing Balance:  Static Standing Balance  Static Standing-Balance Support: Bilateral upper extremity supported  Static Standing-Level of Assistance: Moderate assistance  Dynamic Standing Balance  Dynamic Standing-Balance:  (during unsupported stand and ADL)  Dynamic Standing-Comments: CG/min A.   slight posterior lean yet pt is able to self correct    Vision: Vision - Basic Assessment  Current Vision: Wears glasses all the time      Sensation:  Light Touch: Not tested    Strength:  Strength Comments: pt BUE strength improving to Fair        Outcome Measures: [facility] Daily Activity  Putting on and taking off regular lower body clothing: A lot  Bathing (including washing, rinsing, drying): A lot  Putting on and taking off regular upper body clothing: A lot  Toileting, which includes using toilet, bedpan or urinal: A lot  Taking care of personal grooming such as brushing teeth: A lot  Eating Meals: A little  Daily Activity - Total Score: 13                    EDUCATION:     Education Documentation  Body Mechanics, taught by Denia Blum OT at 6/27/2024  1:08 PM.  Learner: Patient  Readiness: Acceptance  Method: Explanation, Demonstration  Response: Demonstrated Understanding    ADL Training, taught by Denia Blum OT at 6/27/2024  1:08 PM.  Learner: Patient  Readiness: Acceptance  Method: Explanation, Demonstration  Response: Demonstrated Understanding    Body Mechanics, taught by Denia Blum OT at 6/26/2024  1:20 PM.  Learner: Patient  Readiness: Eager  Method: Demonstration,  Explanation  Response: Demonstrated Understanding, Needs Reinforcement    Precautions, taught by Denia Blum OT at 6/26/2024  1:20 PM.  Learner: Patient  Readiness: Eager  Method: Demonstration, Explanation  Response: Demonstrated Understanding, Needs Reinforcement    ADL Training, taught by Denia Blum OT at 6/26/2024  1:20 PM.  Learner: Patient  Readiness: Eager  Method: Demonstration, Explanation  Response: Demonstrated Understanding, Needs Reinforcement    Education Comments  No comments found.        Goals:   Encounter Problems       Encounter Problems (Active)       ADLs       Patient will perform UB bathing  with stand by assist level of assistance. (Progressing)       Start:  06/26/24    Expected End:  07/09/24            Patient will feed self with minimal assist  level of assistance and using PRN adaptive equipment. (Progressing)       Start:  06/26/24    Expected End:  07/09/24               BALANCE       Pt will maintain dynamic sitting balance during ADL task with stand by assist level of assistance in order to demonstrate decreased risk of falling and improved postural control. (Progressing)       Start:  06/26/24    Expected End:  07/09/24               TRANSFERS       Patient will perform bed mobility minimal assist  level of assistance in order to improve safety and independence with mobility (Progressing)       Start:  06/26/24    Expected End:  07/09/24            Patient will complete functional transfers with least restrictive device with minimal assist  level of assistance. (Progressing)       Start:  06/26/24    Expected End:  07/09/24

## 2024-06-28 VITALS
OXYGEN SATURATION: 98 % | WEIGHT: 128.09 LBS | HEIGHT: 64 IN | BODY MASS INDEX: 21.87 KG/M2 | DIASTOLIC BLOOD PRESSURE: 67 MMHG | TEMPERATURE: 97.2 F | RESPIRATION RATE: 16 BRPM | SYSTOLIC BLOOD PRESSURE: 178 MMHG | HEART RATE: 80 BPM

## 2024-06-28 PROBLEM — I25.10 CAD (CORONARY ARTERY DISEASE): Status: RESOLVED | Noted: 2023-01-24 | Resolved: 2024-06-28

## 2024-06-28 PROBLEM — D64.9 ANEMIA: Status: RESOLVED | Noted: 2023-01-24 | Resolved: 2024-06-28

## 2024-06-28 PROBLEM — R07.89 OTHER CHEST PAIN: Status: RESOLVED | Noted: 2024-05-23 | Resolved: 2024-06-28

## 2024-06-28 PROBLEM — I16.0 HYPERTENSIVE URGENCY: Status: RESOLVED | Noted: 2024-06-24 | Resolved: 2024-06-28

## 2024-06-28 PROBLEM — G45.9 TIA (TRANSIENT ISCHEMIC ATTACK): Status: RESOLVED | Noted: 2024-06-24 | Resolved: 2024-06-28

## 2024-06-28 PROBLEM — G25.81 RESTLESS LEG SYNDROME: Status: RESOLVED | Noted: 2023-01-24 | Resolved: 2024-06-28

## 2024-06-28 PROBLEM — R41.0 DELIRIUM: Status: RESOLVED | Noted: 2024-06-24 | Resolved: 2024-06-28

## 2024-06-28 LAB
ALBUMIN SERPL BCP-MCNC: 3.2 G/DL (ref 3.4–5)
ALP SERPL-CCNC: 68 U/L (ref 33–136)
ALT SERPL W P-5'-P-CCNC: 10 U/L (ref 7–45)
ANION GAP SERPL CALC-SCNC: 8 MMOL/L (ref 10–20)
AST SERPL W P-5'-P-CCNC: 19 U/L (ref 9–39)
BASOPHILS # BLD AUTO: 0.04 X10*3/UL (ref 0–0.1)
BASOPHILS NFR BLD AUTO: 0.7 %
BILIRUB SERPL-MCNC: 0.4 MG/DL (ref 0–1.2)
BUN SERPL-MCNC: 19 MG/DL (ref 6–23)
CALCIUM SERPL-MCNC: 8.2 MG/DL (ref 8.6–10.3)
CHLORIDE SERPL-SCNC: 109 MMOL/L (ref 98–107)
CO2 SERPL-SCNC: 24 MMOL/L (ref 21–32)
CREAT SERPL-MCNC: 0.73 MG/DL (ref 0.5–1.05)
EGFRCR SERPLBLD CKD-EPI 2021: 82 ML/MIN/1.73M*2
EOSINOPHIL # BLD AUTO: 0.3 X10*3/UL (ref 0–0.4)
EOSINOPHIL NFR BLD AUTO: 5 %
ERYTHROCYTE [DISTWIDTH] IN BLOOD BY AUTOMATED COUNT: 12.5 % (ref 11.5–14.5)
GLUCOSE BLD MANUAL STRIP-MCNC: 96 MG/DL (ref 74–99)
GLUCOSE BLD MANUAL STRIP-MCNC: 96 MG/DL (ref 74–99)
GLUCOSE SERPL-MCNC: 90 MG/DL (ref 74–99)
HCT VFR BLD AUTO: 29.4 % (ref 36–46)
HGB BLD-MCNC: 9.2 G/DL (ref 12–16)
HOLD SPECIMEN: NORMAL
IMM GRANULOCYTES # BLD AUTO: 0.02 X10*3/UL (ref 0–0.5)
IMM GRANULOCYTES NFR BLD AUTO: 0.3 % (ref 0–0.9)
LYMPHOCYTES # BLD AUTO: 3.51 X10*3/UL (ref 0.8–3)
LYMPHOCYTES NFR BLD AUTO: 58.7 %
MCH RBC QN AUTO: 29.9 PG (ref 26–34)
MCHC RBC AUTO-ENTMCNC: 31.3 G/DL (ref 32–36)
MCV RBC AUTO: 96 FL (ref 80–100)
MONOCYTES # BLD AUTO: 0.33 X10*3/UL (ref 0.05–0.8)
MONOCYTES NFR BLD AUTO: 5.5 %
NEUTROPHILS # BLD AUTO: 1.78 X10*3/UL (ref 1.6–5.5)
NEUTROPHILS NFR BLD AUTO: 29.8 %
NRBC BLD-RTO: 0 /100 WBCS (ref 0–0)
PLATELET # BLD AUTO: 187 X10*3/UL (ref 150–450)
POTASSIUM SERPL-SCNC: 3.8 MMOL/L (ref 3.5–5.3)
PROT SERPL-MCNC: 5.7 G/DL (ref 6.4–8.2)
RBC # BLD AUTO: 3.08 X10*6/UL (ref 4–5.2)
SODIUM SERPL-SCNC: 137 MMOL/L (ref 136–145)
WBC # BLD AUTO: 6 X10*3/UL (ref 4.4–11.3)

## 2024-06-28 PROCEDURE — 2500000004 HC RX 250 GENERAL PHARMACY W/ HCPCS (ALT 636 FOR OP/ED): Performed by: HOSPITALIST

## 2024-06-28 PROCEDURE — 94761 N-INVAS EAR/PLS OXIMETRY MLT: CPT

## 2024-06-28 PROCEDURE — 2500000005 HC RX 250 GENERAL PHARMACY W/O HCPCS: Performed by: HOSPITALIST

## 2024-06-28 PROCEDURE — 97535 SELF CARE MNGMENT TRAINING: CPT | Mod: GO

## 2024-06-28 PROCEDURE — 99239 HOSP IP/OBS DSCHRG MGMT >30: CPT | Performed by: HOSPITALIST

## 2024-06-28 PROCEDURE — 2500000001 HC RX 250 WO HCPCS SELF ADMINISTERED DRUGS (ALT 637 FOR MEDICARE OP): Performed by: HOSPITALIST

## 2024-06-28 PROCEDURE — 85025 COMPLETE CBC W/AUTO DIFF WBC: CPT | Performed by: HOSPITALIST

## 2024-06-28 PROCEDURE — 36415 COLL VENOUS BLD VENIPUNCTURE: CPT | Performed by: INTERNAL MEDICINE

## 2024-06-28 PROCEDURE — 82947 ASSAY GLUCOSE BLOOD QUANT: CPT | Mod: 91

## 2024-06-28 PROCEDURE — 80053 COMPREHEN METABOLIC PANEL: CPT | Performed by: INTERNAL MEDICINE

## 2024-06-28 PROCEDURE — C9113 INJ PANTOPRAZOLE SODIUM, VIA: HCPCS | Performed by: HOSPITALIST

## 2024-06-28 PROCEDURE — 2500000002 HC RX 250 W HCPCS SELF ADMINISTERED DRUGS (ALT 637 FOR MEDICARE OP, ALT 636 FOR OP/ED): Performed by: HOSPITALIST

## 2024-06-28 RX ORDER — PANTOPRAZOLE SODIUM 40 MG/1
40 TABLET, DELAYED RELEASE ORAL
Status: DISCONTINUED | OUTPATIENT
Start: 2024-06-29 | End: 2024-06-28 | Stop reason: HOSPADM

## 2024-06-28 RX ORDER — CARVEDILOL 12.5 MG/1
12.5 TABLET ORAL
Status: DISCONTINUED | OUTPATIENT
Start: 2024-06-28 | End: 2024-06-28 | Stop reason: HOSPADM

## 2024-06-28 RX ORDER — NAPROXEN SODIUM 220 MG/1
81 TABLET, FILM COATED ORAL DAILY
Status: DISCONTINUED | OUTPATIENT
Start: 2024-06-28 | End: 2024-06-28 | Stop reason: HOSPADM

## 2024-06-28 RX ORDER — PRAMIPEXOLE DIHYDROCHLORIDE 0.5 MG/1
1 TABLET ORAL NIGHTLY
Status: DISCONTINUED | OUTPATIENT
Start: 2024-06-28 | End: 2024-06-28 | Stop reason: HOSPADM

## 2024-06-28 RX ORDER — AMOXICILLIN 250 MG
2 CAPSULE ORAL DAILY PRN
Qty: 60 TABLET | Refills: 0 | Status: SHIPPED | OUTPATIENT
Start: 2024-06-28 | End: 2024-08-27

## 2024-06-28 RX ORDER — SERTRALINE HYDROCHLORIDE 50 MG/1
25 TABLET, FILM COATED ORAL DAILY
Status: DISCONTINUED | OUTPATIENT
Start: 2024-06-28 | End: 2024-06-28 | Stop reason: HOSPADM

## 2024-06-28 RX ORDER — AMOXICILLIN 250 MG
2 CAPSULE ORAL 2 TIMES DAILY
Status: DISCONTINUED | OUTPATIENT
Start: 2024-06-28 | End: 2024-06-28 | Stop reason: HOSPADM

## 2024-06-28 RX ORDER — FUROSEMIDE 20 MG/1
20 TABLET ORAL DAILY
Status: DISCONTINUED | OUTPATIENT
Start: 2024-06-28 | End: 2024-06-28 | Stop reason: HOSPADM

## 2024-06-28 RX ORDER — CLOPIDOGREL BISULFATE 75 MG/1
75 TABLET ORAL DAILY
Status: DISCONTINUED | OUTPATIENT
Start: 2024-06-28 | End: 2024-06-28 | Stop reason: HOSPADM

## 2024-06-28 RX ORDER — AMLODIPINE BESYLATE 10 MG/1
10 TABLET ORAL DAILY
Status: DISCONTINUED | OUTPATIENT
Start: 2024-06-28 | End: 2024-06-28 | Stop reason: HOSPADM

## 2024-06-28 RX ORDER — ATORVASTATIN CALCIUM 40 MG/1
40 TABLET, FILM COATED ORAL NIGHTLY
Status: DISCONTINUED | OUTPATIENT
Start: 2024-06-28 | End: 2024-06-28 | Stop reason: HOSPADM

## 2024-06-28 ASSESSMENT — COGNITIVE AND FUNCTIONAL STATUS - GENERAL
EATING MEALS: A LITTLE
MOBILITY SCORE: 19
TOILETING: A LITTLE
DRESSING REGULAR LOWER BODY CLOTHING: A LITTLE
DAILY ACTIVITIY SCORE: 18
STANDING UP FROM CHAIR USING ARMS: A LITTLE
WALKING IN HOSPITAL ROOM: A LITTLE
HELP NEEDED FOR BATHING: A LITTLE
HELP NEEDED FOR BATHING: A LITTLE
EATING MEALS: A LITTLE
DRESSING REGULAR LOWER BODY CLOTHING: A LITTLE
MOVING TO AND FROM BED TO CHAIR: A LITTLE
TOILETING: A LITTLE
DRESSING REGULAR UPPER BODY CLOTHING: A LITTLE
PERSONAL GROOMING: A LITTLE
CLIMB 3 TO 5 STEPS WITH RAILING: A LITTLE
DAILY ACTIVITIY SCORE: 18
TURNING FROM BACK TO SIDE WHILE IN FLAT BAD: A LITTLE
DRESSING REGULAR UPPER BODY CLOTHING: A LITTLE
PERSONAL GROOMING: A LITTLE

## 2024-06-28 ASSESSMENT — PAIN - FUNCTIONAL ASSESSMENT
PAIN_FUNCTIONAL_ASSESSMENT: 0-10

## 2024-06-28 ASSESSMENT — ACTIVITIES OF DAILY LIVING (ADL)
BATHING_LEVEL_OF_ASSISTANCE: MINIMUM ASSISTANCE
BATHING_WHERE_ASSESSED: OTHER (COMMENT)
HOME_MANAGEMENT_TIME_ENTRY: 39

## 2024-06-28 ASSESSMENT — PAIN SCALES - GENERAL
PAINLEVEL_OUTOF10: 0 - NO PAIN

## 2024-06-28 ASSESSMENT — PAIN DESCRIPTION - ORIENTATION: ORIENTATION: POSTERIOR

## 2024-06-28 ASSESSMENT — PAIN DESCRIPTION - LOCATION: LOCATION: NECK

## 2024-06-28 NOTE — PROGRESS NOTES
Pt reviewed during Care Rounds today and she is ready for discharge. SW met with pt and several family members following therapy sessions today and pt is doing much better.  SW observed pt ambulating down the hallway with her rollator and 1 assist (standby).  Pt wants to discharge home with outpatient therapies; family is supportive of the same.  Pt is active with  PCP, Dr. Leggett,  so she would like to use  Rehab on [place]. Dr. Casarez and Roz/Charge Nurse updated. IM reviewed with pt and spouse with no questions/concerns- copy added to pt's chart, another copy provided to spouse. No further needs identified.       VANI Caraballo

## 2024-06-28 NOTE — PROGRESS NOTES
Occupational Therapy    OT Treatment    Patient Name: Tierney Madrid  MRN: [id number]  Today's Date: 6/28/2024  Time Calculation  Start Time: 0718  Stop Time: 0757  Time Calculation (min): 39 min        Assessment:  OT Assessment: pt agreeable to partial ADL.  pt able to perform LB ADls with overall min A and improved balance and safety.   pt is able to communicte slightly better compared to yesterday.  Continue with skilled OT services to be able to return to [facility].  End of Session Communication: Bedside nurse  End of Session Patient Position: Up in chair, Alarm on     Plan:  Treatment Interventions: ADL retraining, UE strengthening/ROM, Functional transfer training, Patient/family training, Equipment evaluation/education, Neuromuscular reeducation, Fine motor coordination activities, Endurance training  OT Frequency: 5 times per week  OT Discharge Recommendations: High intensity level of continued care  Treatment Interventions: ADL retraining, UE strengthening/ROM, Functional transfer training, Patient/family training, Equipment evaluation/education, Neuromuscular reeducation, Fine motor coordination activities, Endurance training    Subjective   Previous Visit Info:  OT Last Visit  OT Received On: 06/28/24  General:  General  Reason for Referral: impaired mobility;pt scheduled for outpt cath lab procedure on 6/24/24 with change in mental status post op.  CT and MRI head negative.  differential dx of possible seizure- awaiting EEG.  pt was noted with some left hemiplegia and aphasia.  Referred By: Soraya Del Valle  Past Medical History Relevant to Rehab: TIA, CAD s/p stent placement, supraventricular tachyarrhythmia, HLD, RLS, delirium, anemia  Family/Caregiver Present: Yes (pt  present and sleepin in chair)  Prior to Session Communication: Bedside nurse, Care Coordinator  Patient Position Received: Bed, 3 rail up, Alarm on  General Comment: pt agreeable to session.  Precautions:  Medical Precautions: Fall  "precautions    Pain:  Pain Assessment  Pain Assessment: 0-10  0-10 (Numeric) Pain Score: 0 - No pain    Objective    Cognition:  Cognition  Divided Attention: Impaired  Sustained Attention: Impaired  Routine Tasks: Minimal  Insight: Moderate  Processing Speed: Within funtional limits    Activities of Daily Living:      LE Bathing  LE Bathing Level of Assistance: Minimum assistance  LE Bathing Where Assessed: Other (Comment) (on commode in bathroom)  LE Bathing Comments: good safety and sequencing    UE Dressing  UE Dressing Comments: pt declines to change hospital gown \"I am going home today\"    LE Dressing  LE Dressing: Yes  Pants Level of Assistance: Minimum assistance  Adult Briefs Level of Assistance: Minimum assistance  LE Dressing Comments: pt states she uses reacher and sock aid at home usually yet declines to trial them for session today.   pt is able to thread B feet without AE with minimal assist for left pant/brief leg.    Toileting  Toileting Level of Assistance: Close supervision  Toileting Comments: pt able to perform hygiene and clothing management both doff and don.   no LOB     Bed Mobility/Transfers: Bed Mobility  Bed Mobility: Yes  Bed Mobility 1  Bed Mobility 1: Supine to sitting  Level of Assistance 1: Close supervision    Transfers  Transfer: Yes  Transfer 1  Transfer From 1: Sit to, Stand to  Transfer to 1: Stand, Sit  Technique 1: Sit to stand, Stand to sit  Transfer Device 1: Gait belt, Walker  Transfer Level of Assistance 1: Close supervision  Trials/Comments 1: good safety with locking walker and good hand placement    Toilet Transfers  Toilet Transfer From: Bed  Toilet Transfer Technique: Ambulating  Toilet Transfers: Contact guard, Supervision (emerging SBA with improved balance and activity tolerance)    Functional Mobility:  Functional Mobility  Functional Mobility Performed: Yes  Functional Mobility 1  Surface 1: Level tile  Device 1:  (upright walker)  Functional Mobility Support " Devices: Gait belt  Assistance 1: Close supervision, Contact guard (emerging SBA)       Strength:  Strength Comments: improving to F+/G -      Outcome Measures:[facility] Daily Activity  Putting on and taking off regular lower body clothing: A little  Bathing (including washing, rinsing, drying): A little  Putting on and taking off regular upper body clothing: A little  Toileting, which includes using toilet, bedpan or urinal: A little  Taking care of personal grooming such as brushing teeth: A little  Eating Meals: A little  Daily Activity - Total Score: 18        Education Documentation  Body Mechanics, taught by Denia Blum OT at 6/28/2024  9:54 AM.  Learner: Patient  Readiness: Acceptance  Method: Explanation, Demonstration  Response: Demonstrated Understanding, Verbalizes Understanding    Precautions, taught by Denia Blum OT at 6/28/2024  9:54 AM.  Learner: Patient  Readiness: Acceptance  Method: Explanation, Demonstration  Response: Demonstrated Understanding, Verbalizes Understanding    ADL Training, taught by eDnia Blum OT at 6/28/2024  9:54 AM.  Learner: Patient  Readiness: Acceptance  Method: Explanation, Demonstration  Response: Demonstrated Understanding, Verbalizes Understanding    Body Mechanics, taught by Denia Blum OT at 6/27/2024  1:08 PM.  Learner: Patient  Readiness: Acceptance  Method: Explanation, Demonstration  Response: Demonstrated Understanding    ADL Training, taught by Denia Blum OT at 6/27/2024  1:08 PM.  Learner: Patient  Readiness: Acceptance  Method: Explanation, Demonstration  Response: Demonstrated Understanding    Education Comments  No comments found.        OP EDUCATION:       Goals:  Encounter Problems       Encounter Problems (Active)       ADLs       Patient will perform UB bathing  with stand by assist level of assistance. (Progressing)       Start:  06/26/24    Expected End:  07/09/24            Patient will feed self with minimal assist  level of assistance and using PRN  adaptive equipment. (Progressing)       Start:  06/26/24    Expected End:  07/09/24               BALANCE       Pt will maintain dynamic sitting balance during ADL task with stand by assist level of assistance in order to demonstrate decreased risk of falling and improved postural control. (Progressing)       Start:  06/26/24    Expected End:  07/09/24               TRANSFERS       Patient will perform bed mobility minimal assist  level of assistance in order to improve safety and independence with mobility (Progressing)       Start:  06/26/24    Expected End:  07/09/24            Patient will complete functional transfers with least restrictive device with minimal assist  level of assistance. (Progressing)       Start:  06/26/24    Expected End:  07/09/24

## 2024-06-28 NOTE — PROGRESS NOTES
Medication Education     Medication education for Tierney Madrid was provided to the patient and family for the following medication(s):  Atorvastatin  Senna/Docusate    Medication education provided by a Pharmacist:  ADR Counseling Alternative method of administration Medication interactions Alternative drug Dose, frequency, storage How to take and what to do if a dose is missed Proper dose, indication, possible ADRs    Identified potential barriers to education:  None    Method(s) of Education:  Verbal Written materials provided and reviewed    An opportunity to ask questions and receive answers was provided.     Assessment of understanding the patient and family:  2= meets goals/outcomes    Additional Notes (if applicable): Informed patient and family that RX's were sent to [facility] in [place]. Patient and family reviewed patients home medication list with me in the room and everything looked correct.     Milady Horan, PharmD

## 2024-06-28 NOTE — CARE PLAN
Problem: Pain - Adult  Goal: Verbalizes/displays adequate comfort level or baseline comfort level  Outcome: Progressing     Problem: Safety - Adult  Goal: Free from fall injury  Outcome: Progressing     Problem: Discharge Planning  Goal: Discharge to home or other facility with appropriate resources  Outcome: Progressing     Problem: Chronic Conditions and Co-morbidities  Goal: Patient's chronic conditions and co-morbidity symptoms are monitored and maintained or improved  Outcome: Progressing     Problem: Skin  Goal: Participates in plan/prevention/treatment measures  Outcome: Progressing  Goal: Prevent/manage excess moisture  Outcome: Progressing     Problem: Fall/Injury  Goal: Be free from injury by end of the shift  Outcome: Progressing  Goal: Verbalize understanding of personal risk factors for fall in the hospital  Outcome: Progressing     Problem: Pain  Goal: Turns in bed with improved pain control throughout the shift  Outcome: Progressing  Goal: Participates in PT with improved pain control throughout the shift  Outcome: Progressing     Problem: Neurosensory - Adult  Goal: Achieves stable or improved neurological status  Outcome: Progressing  Goal: Absence of seizures  Outcome: Progressing     Problem: Cardiovascular - Adult  Goal: Maintains optimal cardiac output and hemodynamic stability  Outcome: Progressing  Goal: Absence of cardiac dysrhythmias or at baseline  Outcome: Progressing     Problem: Nutrition  Goal: Oral intake greater than 50%  Outcome: Progressing  Goal: Consume prescribed supplement  Outcome: Progressing  Goal: Adequate PO fluid intake  Outcome: Progressing  Goal: Lab values WNL  Outcome: Progressing  Goal: Electrolytes WNL  Outcome: Progressing  Goal: Maintain stable weight  Outcome: Progressing   The patient's goals for the shift include use the call light    The clinical goals for the shift include no falls    Pt bed alarm on. Pt alert but experiencing expressive aphasia.

## 2024-06-28 NOTE — DISCHARGE SUMMARY
Discharge Diagnosis  TIA (transient ischemic attack)  Anemia    CAD (coronary artery disease)    Restless leg syndrome    Delirium    Hypertensive urgency    Other chest pain  Generalized weakness.  Aphasia.  Dysphagia.    DISCHARGE INSTRUCTIONS  Disposition: Home with outpatient PT, OT, speech  DIET: Cardiac  Activity: As tolerated    Test Results Pending At Discharge  Pending Labs       No current pending labs.            Hospital Course   84 y.o. female with a past medical history of coronary artery disease with status post stents, supraventricular tachyarrhythmia, hypertension, hyperlipidemia, restless leg syndrome presented to the Cath Lab today for [facility] due to worsening dyspnea on exertion and substernal chest discomfort.  Patient ended up having cath with s/p successful PCI to distal and proximal LAD using 2 stents and 1 to ostial/proximal RCA using RONALD.  During procedure patient's blood pressure systolic was running high in 240, got dose of hydralazine, Norvasc 5 mg, Plavix 300 mg and lisinopril 20 mg.  During the procedure patient got fentanyl 125 mcg and Versed 2.5 mg for sedation.  However patient was able awake partially during the procedure.  Postprocedure patient was confused, was given 4 Narcan and 2 Romazicon.  Postprocedure patient also had a blood sugar of 75, was given 1 ampoule of D50.  Follow-up blood sugar 150.  She was also complaining of some chest pain, was given nitroglycerin sublingual, which resulted in her blood pressure dropping into 120.  At this point patient became more confused, stroke alert was called.  Initial CT of head was negative for acute process.  Neurology was consulted.  Patient was deemed not a candidate for endovascular treatment, NIH score of 9.  Neurology recommended MRI with stroke protocol, MRA head and neck.  Continue on dual platelet and diet therapy.  Patient currently alert and awake and responds to her name, does not follow any further commands.  Current blood  pressure was 208/19.  O2 sats 98% on room air.  Pulse 95.  Respirations 15.     During the course of hospitalization, patient was evaluated for acute confusion with CT of brain, negative for acute process.  MRI of brain negative for acute process.  Was started on stroke protocol.  Neurochecks were normal.  Significant improvement other than significant aphasia, expressive.  Worked with speech.  Initially she was incontinent for oral intake, NG tube was placed to start her on cardiac medications post cath.  Once she became more awake and alert, speech evaluated and NG was removed and diet was restarted.  Patient continues to improve every day, requiring PT and OT and speech.  Per family request patient is discharged home to do outpatient aggressive rehab.  Patient was on aspirin and Plavix and statin which were continued during hospitalization and posthospitalization.  Patient s/p cardiac cath with 3 stents on the day of admission, was on dual antiplatelet along with atorvastatin and Coreg which will be continued.  She will do a follow-up with cardiology.  Currently asymptomatic.  Her confusion on admission, post delirious secondary due to medications, was ruled out stroke.  EEG was ordered could not be done at this facility.  Case was discussed with neurology since her initial admission with Dr. Cox, appreciate her input during the whole hospitalization and follow-ups.  Her chronic medical conditions remained to be stable.  Patient, very coherent, spouse and children in room verbalized agree with the above plan.      Pertinent Physical Exam At Time of Discharge  Physical Exam  Gen: NAD, awake, alert, oriented to name.  Follows all commands.  HEENT: Normal size, shape; EOMI intact. Sclera normal.Ears normal.Oropharynx pink and moist.  Neck: Paraspinal area on the right side of the neck tender to palpate.  No deformities noted.  No mass.  Spinal area nontender.  Neurovascularly intact.  Supple.no masses noted in  neck, no lymphadenopathy, no tracheal deviation, non-palpable thyroid.No neck rigidity.  Chest: Chest wall on the left anterior chest tender to palpate, no crepitus, no deformity noted.  Chest symmetry with respirations, no wheezes, crackles  CVS: RRR, no rubs  Abd: soft, NT/ND, +BS  Ext: no Clubbing/Cyanosis/edema, non-tender.Pulse 2+. Homans Negative.  Skin: Dry, warm, good condition  Neuro: Cranial nerves II to XII grossly intact.  Grossly moves all extremities, reflexes intact.  Sensory intact.  Ambulating with a walker.    Psy: Calm, anxious at times.  Follows all commands  Home Medications     Medication List      START taking these medications     atorvastatin 40 mg tablet; Commonly known as: Lipitor; Take 1 tablet (40   mg) by mouth once daily.   sennosides-docusate sodium 8.6-50 mg tablet; Commonly known as:   Katya-Colace; Take 2 tablets by mouth once daily as needed for   constipation.     CONTINUE taking these medications     acetaminophen 500 mg tablet; Commonly known as: Tylenol   albuterol 90 mcg/actuation inhaler; Inhale 2 puffs every 6 hours if   needed for wheezing or shortness of breath.   amLODIPine 10 mg tablet; Commonly known as: Norvasc; Take 1 tablet (10   mg) by mouth once daily.   aspirin 81 mg EC tablet   carvedilol 12.5 mg tablet; Commonly known as: Coreg; Take 1 tablet (12.5   mg) by mouth 2 times a day with meals. other   cetirizine 5 mg tablet; Commonly known as: ZyrTEC   clopidogrel 75 mg tablet; Commonly known as: Plavix; Take 1 tablet (75   mg) by mouth once daily.   fluticasone 50 mcg/actuation nasal spray; Commonly known as: Flonase;   Administer 1 spray into each nostril 2 times a day. Instill 1 squirt   furosemide 20 mg tablet; Commonly known as: Lasix; Take 1 tablet by   mouth once daily   isosorbide mononitrate ER 60 mg 24 hr tablet; Commonly known as: Imdur;   Take 1 tablet (60 mg) by mouth once daily.   lisinopril 20 mg tablet; Take 1 tablet (20 mg) by mouth once daily.    naloxone 4 mg/0.1 mL nasal spray; Commonly known as: Narcan   nitroglycerin 0.4 mg SL tablet; Commonly known as: Nitrostat; Place 1   tablet (0.4 mg) under the tongue every 5 minutes if needed for chest pain.   For up to 3 doses as needed for chest pain. Call 911 if pain persists   oxyCODONE 10 mg immediate release tablet; Commonly known as: Roxicodone;   Take 1 tablet (10 mg) by mouth 3 times a day.   pantoprazole 40 mg EC tablet; Commonly known as: ProtoNix; Take 1 tablet   (40 mg) by mouth once daily.   pramipexole 0.5 mg tablet; Commonly known as: Mirapex; Take 1 tablet   (0.5 mg) by mouth 2 times a day.   sertraline 25 mg tablet; Commonly known as: Zoloft; Take 1 tablet (25   mg) by mouth once daily.       DISCHARGE MEDICATIONS     Your medication list        START taking these medications        Instructions Last Dose Given Next Dose Due   atorvastatin 40 mg tablet  Commonly known as: Lipitor      Take 1 tablet (40 mg) by mouth once daily.       sennosides-docusate sodium 8.6-50 mg tablet  Commonly known as: Katya-Colace      Take 2 tablets by mouth once daily as needed for constipation.              CONTINUE taking these medications        Instructions Last Dose Given Next Dose Due   acetaminophen 500 mg tablet  Commonly known as: Tylenol           albuterol 90 mcg/actuation inhaler      Inhale 2 puffs every 6 hours if needed for wheezing or shortness of breath.       amLODIPine 10 mg tablet  Commonly known as: Norvasc      Take 1 tablet (10 mg) by mouth once daily.       aspirin 81 mg EC tablet           carvedilol 12.5 mg tablet  Commonly known as: Coreg      Take 1 tablet (12.5 mg) by mouth 2 times a day with meals. other       cetirizine 5 mg tablet  Commonly known as: ZyrTEC           clopidogrel 75 mg tablet  Commonly known as: Plavix      Take 1 tablet (75 mg) by mouth once daily.       fluticasone 50 mcg/actuation nasal spray  Commonly known as: Flonase      Administer 1 spray into each nostril 2 times  a day. Instill 1 squirt       furosemide 20 mg tablet  Commonly known as: Lasix      Take 1 tablet by mouth once daily       isosorbide mononitrate ER 60 mg 24 hr tablet  Commonly known as: Imdur      Take 1 tablet (60 mg) by mouth once daily.       lisinopril 20 mg tablet      Take 1 tablet (20 mg) by mouth once daily.       naloxone 4 mg/0.1 mL nasal spray  Commonly known as: Narcan           nitroglycerin 0.4 mg SL tablet  Commonly known as: Nitrostat      Place 1 tablet (0.4 mg) under the tongue every 5 minutes if needed for chest pain. For up to 3 doses as needed for chest pain. Call 911 if pain persists       oxyCODONE 10 mg immediate release tablet  Commonly known as: Roxicodone      Take 1 tablet (10 mg) by mouth 3 times a day.       pantoprazole 40 mg EC tablet  Commonly known as: ProtoNix      Take 1 tablet (40 mg) by mouth once daily.       pramipexole 0.5 mg tablet  Commonly known as: Mirapex      Take 1 tablet (0.5 mg) by mouth 2 times a day.       sertraline 25 mg tablet  Commonly known as: Zoloft      Take 1 tablet (25 mg) by mouth once daily.                 Where to Get Your Medications        These medications were sent to [facility] [address] [place] [address]      Phone: [phone]   atorvastatin 40 mg tablet  sennosides-docusate sodium 8.6-50 mg tablet         Outpatient Follow-Up  Future Appointments   Date Time Provider Department [place]   7/2/2024  3:00 PM SANAZ Gutierrez-CNP [id number] [place]   8/6/2024 10:00 AM Sami Leggett MD [id number] [facility]     Total discharge time 35 minutes    Soraya Casarez MD    (This note was generated with voice recognition software and may contain errors including spelling, grammar, syntax and misrecognition of what was dictated, that are not fully corrected)

## 2024-06-28 NOTE — NURSING NOTE
Discharge instructions reviewed with pt and pt's , other family present. IV removed. Pt left unit via wheelchair with family, discharged to home.

## 2024-07-01 ENCOUNTER — PATIENT OUTREACH (OUTPATIENT)
Dept: PRIMARY CARE | Facility: CLINIC | Age: 84
End: 2024-07-01
Payer: MEDICARE

## 2024-07-01 ENCOUNTER — TELEPHONE (OUTPATIENT)
Dept: CARDIOLOGY | Facility: CLINIC | Age: 84
End: 2024-07-01
Payer: MEDICARE

## 2024-07-01 DIAGNOSIS — I25.119 CORONARY ARTERY DISEASE INVOLVING NATIVE CORONARY ARTERY OF NATIVE HEART WITH ANGINA PECTORIS (CMS-HCC): Primary | ICD-10-CM

## 2024-07-01 DIAGNOSIS — I25.10 CORONARY ARTERY DISEASE INVOLVING NATIVE CORONARY ARTERY OF NATIVE HEART WITHOUT ANGINA PECTORIS: ICD-10-CM

## 2024-07-01 RX ORDER — ROSUVASTATIN CALCIUM 20 MG/1
20 TABLET, COATED ORAL DAILY
Qty: 30 TABLET | Refills: 11 | Status: SHIPPED | OUTPATIENT
Start: 2024-07-01 | End: 2025-07-01

## 2024-07-01 NOTE — PROGRESS NOTES
Discharge Facility: [facility]  Discharge Diagnosis:   TIA (transient ischemic attack)  Anemia    CAD (coronary artery disease)    Restless leg syndrome    Delirium    Hypertensive urgency    Other chest pain  Generalized weakness.  Aphasia.  Dysphagia.    Admission Date: 6/24/2024  Discharge Date: 6/28/2024    PCP Appointment Date: none-task  Specialist Appointment Date:   -speech therapy 7/3/2024  -cardiology 7/8/2024    Hospital Encounter and Summary: not available at this time   See discharge assessment below for further details    DISCHARGE INSTRUCTIONS  Disposition: Home with outpatient PT, OT, speech  DIET: Cardiac  Activity: As tolerated    Engagement  Call Start Time: 1208 (7/1/2024 12:08 PM)    Medications  Medications reviewed with patient/caregiver?: Yes (7/1/2024 12:08 PM)  Is the patient having any side effects they believe may be caused by any medication additions or changes?: (!) Yes (leg aches since starting Lipitor) (7/1/2024 12:08 PM)  Does the patient have all medications ordered at discharge?: Yes (7/1/2024 12:08 PM)  Care Management Interventions: Advised patient to call provider (7/1/2024 12:08 PM)  Prescription Comments: new: lipitor and criss colace (7/1/2024 12:08 PM)  Is the patient taking all medications as directed (includes completed medication regime)?: Yes (7/1/2024 12:08 PM)  Medication Comments: see med list (7/1/2024 12:08 PM)    Appointments  Does the patient have a primary care provider?: Yes (7/1/2024 12:08 PM)  Care Management Interventions: Advised patient to make appointment (7/1/2024 12:08 PM)  Has the patient kept scheduled appointments due by today?: Yes (7/1/2024 12:08 PM)  Care Management Interventions: Advised patient to keep appointment (7/1/2024 12:08 PM)    Self Management  What is the home health agency?: none- outpatient therapy (7/1/2024 12:08 PM)  Has home health visited the patient within 72 hours of discharge?: Not applicable (7/1/2024 12:08 PM)    Patient  Teaching  Does the patient have access to their discharge instructions?: Yes (7/1/2024 12:08 PM)  Care Management Interventions: Reviewed instructions with patient (7/1/2024 12:08 PM)  What is the patient's perception of their health status since discharge?: Improving (7/1/2024 12:08 PM)  Is the patient/caregiver able to teach back the hierarchy of who to call/visit for symptoms/problems? PCP, Specialist, Home Health nurse, Urgent Care, ED, 911: Yes (7/1/2024 12:08 PM)    Wrap Up  Wrap Up Additional Comments: CTS spoke with daughter. Patient was admitted to [facility] on 6/24/2024 with TIA. Discharge on 6/28/2024 with outpatient PT, OT and speech therapy. Patient ended up having cath with s/p successful PCI to distal and proximal LAD using 2 stents and 1 to ostial/proximal RCA using RONALD. Daughter stated that her mom was doing better. Denies any chest pains, shortness of breath or confusion. Reviewed medications. She did state that the patient was having some leg cramping since starting lipitor. I did advise the daughter to reach out to cardiology for assistance with that. Understands discharge instructions. No questions or concerns at this time. (7/1/2024 12:08 PM)  Call End Time: 1215 (7/1/2024 12:08 PM)

## 2024-07-01 NOTE — PROGRESS NOTES
Patient's daughter called the office to inform that her mother Tierney has been complaining of severe leg pain and weakness since starting atorvastatin 40 mg daily.  She had PCI to the LAD and RCA last week so she still needs to be on a high intensity statin.  I will switch her to rosuvastatin 20 mg daily and see if she can tolerate that.

## 2024-07-01 NOTE — TELEPHONE ENCOUNTER
Pt's daughter Nina called stating that her mom Tierney has been complaining of severe leg pain and weakness since starting the Lipitor. They wanted to know if there is an alternative medication she can get prescribed?

## 2024-07-02 ENCOUNTER — APPOINTMENT (OUTPATIENT)
Dept: CARDIOLOGY | Facility: CLINIC | Age: 84
End: 2024-07-02
Payer: MEDICARE

## 2024-07-03 ENCOUNTER — EVALUATION (OUTPATIENT)
Dept: SPEECH THERAPY | Facility: CLINIC | Age: 84
End: 2024-07-03
Payer: MEDICARE

## 2024-07-03 ENCOUNTER — LAB (OUTPATIENT)
Dept: LAB | Facility: LAB | Age: 84
End: 2024-07-03
Payer: MEDICARE

## 2024-07-03 ENCOUNTER — TELEPHONE (OUTPATIENT)
Dept: PRIMARY CARE | Facility: CLINIC | Age: 84
End: 2024-07-03
Payer: MEDICARE

## 2024-07-03 DIAGNOSIS — R30.0 DYSURIA: ICD-10-CM

## 2024-07-03 DIAGNOSIS — R30.0 DYSURIA: Primary | ICD-10-CM

## 2024-07-03 DIAGNOSIS — R47.01 APHASIA OF UNKNOWN ORIGIN: Primary | ICD-10-CM

## 2024-07-03 DIAGNOSIS — R48.2 APRAXIA OF SPEECH: ICD-10-CM

## 2024-07-03 DIAGNOSIS — F98.5 ADULT ONSET FLUENCY DISORDER: ICD-10-CM

## 2024-07-03 LAB
APPEARANCE UR: CLEAR
BACTERIA #/AREA URNS AUTO: ABNORMAL /HPF
BILIRUB UR STRIP.AUTO-MCNC: NEGATIVE MG/DL
COLOR UR: ABNORMAL
GLUCOSE UR STRIP.AUTO-MCNC: NORMAL MG/DL
KETONES UR STRIP.AUTO-MCNC: NEGATIVE MG/DL
LEUKOCYTE ESTERASE UR QL STRIP.AUTO: ABNORMAL
MUCOUS THREADS #/AREA URNS AUTO: ABNORMAL /LPF
NITRITE UR QL STRIP.AUTO: ABNORMAL
PH UR STRIP.AUTO: 5.5 [PH]
PROT UR STRIP.AUTO-MCNC: ABNORMAL MG/DL
RBC # UR STRIP.AUTO: ABNORMAL /UL
RBC #/AREA URNS AUTO: ABNORMAL /HPF
SP GR UR STRIP.AUTO: 1.02
UROBILINOGEN UR STRIP.AUTO-MCNC: NORMAL MG/DL
WBC #/AREA URNS AUTO: ABNORMAL /HPF

## 2024-07-03 PROCEDURE — 92523 SPEECH SOUND LANG COMPREHEN: CPT | Mod: GN | Performed by: SPEECH-LANGUAGE PATHOLOGIST

## 2024-07-03 PROCEDURE — 87186 SC STD MICRODIL/AGAR DIL: CPT

## 2024-07-03 PROCEDURE — 87086 URINE CULTURE/COLONY COUNT: CPT

## 2024-07-03 PROCEDURE — 81001 URINALYSIS AUTO W/SCOPE: CPT

## 2024-07-03 ASSESSMENT — PAIN SCALES - GENERAL: PAINLEVEL_OUTOF10: 0 - NO PAIN

## 2024-07-03 ASSESSMENT — PATIENT HEALTH QUESTIONNAIRE - PHQ9
2. FEELING DOWN, DEPRESSED OR HOPELESS: NOT AT ALL
1. LITTLE INTEREST OR PLEASURE IN DOING THINGS: NOT AT ALL
2. FEELING DOWN, DEPRESSED OR HOPELESS: NOT AT ALL
SUM OF ALL RESPONSES TO PHQ9 QUESTIONS 1 AND 2: 0
1. LITTLE INTEREST OR PLEASURE IN DOING THINGS: NOT AT ALL
SUM OF ALL RESPONSES TO PHQ9 QUESTIONS 1 AND 2: 0

## 2024-07-03 ASSESSMENT — ENCOUNTER SYMPTOMS
OCCASIONAL FEELINGS OF UNSTEADINESS: 0
DEPRESSION: 1
LOSS OF SENSATION IN FEET: 0

## 2024-07-03 ASSESSMENT — PAIN - FUNCTIONAL ASSESSMENT: PAIN_FUNCTIONAL_ASSESSMENT: 0-10

## 2024-07-03 NOTE — TELEPHONE ENCOUNTER
Other message sent to Dr. Leggett from patient  ----- Message from Rosa Tian MA sent at 7/3/2024  8:24 AM EDT -----  Regarding: FW: UTI  Contact: [phone]    ----- Message -----  From: Rosa Tian MA  Sent: 7/3/2024   7:42 AM EDT  To: Augustin Lee MD  Subject: FW: UTI                                          Can you address this for Delma?  ----- Message -----  From: Tierney Madrid  Sent: 7/2/2024   8:59 PM EDT  To: #  Subject: UTI                                              Sorry, I should have sent this message to someone else.

## 2024-07-03 NOTE — LETTER
July 3, 2024    Soraya JOHN MD  [address] [facility] [address]    Patient: Tierney Madrid   YOB: 1940   Date of Visit: 7/3/2024       Dear Soraya JOHN MD  [address] [place], [place] [address]    The attached plan of care is being sent to you because your patient’s medical reimbursement requires that you certify the plan of care. Your signature is required to allow uninterrupted insurance coverage.      You may indicate your approval by signing below and faxing this form back to us at Dept Fax: [phone].    Please call Dept: [phone] with any questions or concerns.    Thank you for this referral,        Victorina Navarro CCC-SLP  [address] [address]    Payer: Payor: [employer] / Plan: [employer]ARE ADVANTAGE / Product Type: *No Product type* /                                                                         Date:     Dear Victorina Navarro CCC-MEIR,     Re: Ms. Tierney Madrid, MRN:[id number]    I certify that I have reviewed the attached plan of care and it is medically necessary for Ms. Tierney Madrid (1940) who is under my care.          ______________________________________                    _________________  Provider name and credentials                                           Date and time                                                                                           Plan of Care 7/3/24   Effective from: 7/3/2024  Effective to: 9/3/2024    Plan ID: [id number]            Participants as of Finalize on 7/3/2024    Name Type Comments Contact Info    Soraya JOHN MD Referring Provider  [phone]    Blane Lawrence PA-C Physician Assistant  [phone]    Victorina Navarro CCC-SLP Speech Language Pathologist  [phone]       Last Plan Note     Author: CYRUS Singh Status: Addendum Last edited: 7/3/2024  9:00 AM       Speech-Language Pathology    SLP Adult Outpatient  Speech-Language Cognition    Patient Name: Tierney Madrid  MRN: [id number]  Today's Date: 7/3/2024      Time Calculation  Start Time: 0855  Stop Time: 1006  Time Calculation (min): 71 min      Current Problem:   1. Adult onset fluency disorder        2. Aphasia of unknown origin [R47.01]  Referral to Speech Therapy      3. Apraxia of speech              SLP Assessment:  SLP Assessment  SLP TX Intervention Outcome: Making Progress Towards Goals  SLP Assessment Results: Expression deficits, Receptive Comprehension deficits  Prognosis: Fair  Treatment Provided: No  Treatment Tolerance:  (Formal assessment not completed during this session - to continue next session)  Medical Staff Made Aware: Yes  Strengths: Family/Caregiver Support, Motivation, Cognition  Barriers: None  Education Provided: Yes      SLP Plan:  Plan  Inpatient/Swing Bed or Outpatient: Outpatient  Treatment/Interventions: Expressive Language, Fluency, Patient/family education  SLP TX Plan: Continue Plan of Care  SLP Plan: Skilled SLP  SLP Frequency: 3x per week  Duration: Other (Comment) (60 days)  SLP Discharge Recommendations: Other (Comment) (TBD)  Equipment Recommended: Consider SGD  Next Treatment Priority: continue WAB  Discussed POC: Patient, Caregiver/family  Discussed Risks/Benefits: Yes  Patient/Caregiver Agreeable: Yes  SLP - OK to Discharge: No    Goal(s)  Long Term Goal:  In 30 days, Tierney will demonstrate adequate speech and language skills for functional communication of wants and needs in variety contexts with 75% accuracy independently   Goal Start: 7/3/2024  Anticipated End: 9/3/2024  Goal End:      Short Term Goal(s):  In 2 weeks, Tierney will utilize compensatory strategies to repair communication break downs and decreased frustration with communication with 80% accuracy independently.    Goal Start: 7/3/2024   Anticipated End: 7/19/2024  Goal End:     In 2 weeks, Tierney will utilize pacing and fluency strategies to decrease occurrences  of sound, syllable, and word repetitions and blocks to 50% of utterances given minimal verbal/visual cues.   Goal Start: 7/3/2024  Anticipated End: 7/19/2024  Goal End:     In 4 weeks, Tierney will complete generative and confrontational naming activities with 60% accuracy, given minimal verbal/visual cues.   Goal Start: 7/3/2024  Anticipated End: 8/2/2024  Goal End:     In 4 weeks, Tierney will utilize pacing and fluency strategies to decrease occurrences of sound, syllable, and word repetitions and blocks to 75% of utterances given minimal verbal/visual cues.   Goal Start: 7/3/2024  Anticipated End: 8/2/2024  Goal End:     In 6 weeks, Tierney will produce 2-3 word phrases with 60% accuracy given minimal verbal/visual cues.   Goal Start: 7/3/2024  Anticipated End: 8/16/2024  Goal End:     Ongoing caregiver education regarding goals, progress, and home programming.     Subjective   Current Problem:  Tierney is known to this SLP. She was seen in acute care where the [place] Aphasia Screening Tool was administered. She was found to have severe deficits.     At this time, Tierney and family report improvements in communication. Tierney was noted to produce longer utterances this session, though intelligibility  and fluency are still severely impaired.     Most Recent Visit:  SLP Most Recent Visit  SLP Received On: 07/03/24      General Visit Information:  General Information  Chart Reviewed: Yes  Arrival: Family/caregiver present  Caregiver Feedback: Some improvements in speech and motor function has returned to baseline  Reason for Referral: Aphasia unknown origin  Referred By: Soraya Casarez MD  Past Medical History Relevant to Rehab: TIA, CAD s/p stent placement, supraventricular tachyarrhythmia, HLD, RLS, delirium, anemia  Patient Seen During This Visit: Yes  Total Number of Visits : 1  POC Visits: 0/12     Objective       Pain:  Pain Assessment  Pain Assessment: 0-10  0-10 (Numeric) Pain Score: 0 - No  pain  Patient's Stated Pain Goal: No pain      Cognition:  Cognition  Overall Cognitive Status: Impaired  Orientation Level: Oriented X4  Attention: Exceptions to WFL  Divided Attention: Impaired  Sustained Attention: Impaired      Auditory Comprehension:   Auditory Comprehension  Yes/No Questions: Within Functional Limits  Basic Questions: 100% accuracy  Complex Questions: 100% accuracy  Commands: Impaired  One Step Basic Commands: Within Functional Limits  Two Step Basic Commands: Within Functional Limits (Given increased processing time)  Multistep Basic Commands: Impaired  Complex/Abstract Commands: Impaired  Conversation: Impaired  Conversation Comments: paraphasias, fluency disruption, oral groping      Reading Comprehension:  Reading Comprehension  Reading Status: Unable to assess (Not yet targeted - testing not completed this session)      Verbal:  Verbal Expression  Primary Mode of Expression: Verbal  Primary Language: English  Confrontation Naming: Impaired  Confrontation Objects: Impaired  Confrontation Naming Comments: 38% accuracy - unable to verbalize names  Responsive Naming: Unable to assess (Not yet completed)  Repetition: Impaired  Repetition Comments: 36% accuracy  Sentence Completion: Unable to assess (Not yet completed)  Open Ended Questions: Impaired  Conversation: Impaired  Affect: Impaired  Prosody: Impaired  Eye Contact: Impaired  Topic Initiation: Impaired  Topic Maintenance: Within Functional Limits  Turn Taking: Impaired      Written Expression:  Written Expression  Written Expression: Unable to assess (Not yet completed)      SLP Outcome Measures:  The Western Aphasia Battery, Revised (WAB-R) was administered in order to assess [Fn] 's language function following an acquired neurological disorder. The WAB-R measures both linguistic and non-linguistics skills including speech content, fluency, auditory comprehension, repetition, naming, reading, writing, drawing, calculation, block design  and apraxia. The composite scores obtained are an Aphasia Quotient, Language Quotient, and Cortical Quotient.     The purpose of the WAB-R is to determine the presence, severity, and type of aphasia; measure the patient's level of performance to provide a baseline for detecting any change over time; and provide comprehensive assessment of the patient's language assets and deficits in order to guide treatment and management.    Tierney received the following scores:    SUBCATEGORIES  Spontaneous Speech: 6/20  Auditory Verbal Comprehension: 14.8/20  Repetition: 3.6/10  Naming and Word Finding: TBD - testing ongoing     Aphasia Quotient: TBD - testing ongoing  Aphasia Type: TBD - testing ongoing     Testing was not completed at this time - to be completed in upcoming sessions.     Outpatient Education:  Adult Outpatient Education  Individual(s) Educated: Patient, Spouse, Child  Verbal Education : Observations, recommendations  Risk and Benefits Discussed with Patient/Caregiver/Other: yes  Patient/Caregiver Demonstrated Understanding: yes  Plan of Care Discussed and Agreed Upon: yes  Patient Response to Education: Patient/Caregiver Verbalized Understanding of Information           Current Participants as of 7/3/2024    Name Type Comments Contact Info    Soraya JOHN MD Referring Provider  [phone]    Signature pending    Blane Lawrence PA-C Physician Assistant  [phone]    Signature pending    Victorina Navarro CCC-SLP Speech Language Pathologist  [phone]

## 2024-07-03 NOTE — PROGRESS NOTES
Speech-Language Pathology    SLP Adult Outpatient Speech-Language Cognition    Patient Name: Tierney Madrid  MRN: [id number]  Today's Date: 7/3/2024      Time Calculation  Start Time: 0855  Stop Time: 1006  Time Calculation (min): 71 min      Current Problem:   1. Adult onset fluency disorder        2. Aphasia of unknown origin [R47.01]  Referral to Speech Therapy      3. Apraxia of speech              SLP Assessment:  SLP Assessment  SLP TX Intervention Outcome: Making Progress Towards Goals  SLP Assessment Results: Expression deficits, Receptive Comprehension deficits  Prognosis: Fair  Treatment Provided: No  Treatment Tolerance:  (Formal assessment not completed during this session - to continue next session)  Medical Staff Made Aware: Yes  Strengths: Family/Caregiver Support, Motivation, Cognition  Barriers: None  Education Provided: Yes      SLP Plan:  Plan  Inpatient/Swing Bed or Outpatient: Outpatient  Treatment/Interventions: Expressive Language, Fluency, Patient/family education  SLP TX Plan: Continue Plan of Care  SLP Plan: Skilled SLP  SLP Frequency: 3x per week  Duration: Other (Comment) (60 days)  SLP Discharge Recommendations: Other (Comment) (TBD)  Equipment Recommended: Consider SGD  Next Treatment Priority: continue WAB  Discussed POC: Patient, Caregiver/family  Discussed Risks/Benefits: Yes  Patient/Caregiver Agreeable: Yes  SLP - OK to Discharge: No    Goal(s)  Long Term Goal:  In 60 days, Tierney will demonstrate adequate speech and language skills for functional communication of wants and needs in variety contexts with 75% accuracy independently   Goal Start: 7/3/2024  Anticipated End: 9/3/2024  Goal End:      Short Term Goal(s):  In 2 weeks, Tierney will utilize compensatory strategies to repair communication break downs and decreased frustration with communication with 80% accuracy independently.    Goal Start: 7/3/2024   Anticipated End: 7/19/2024  Goal End:     In 2 weeks, Tierney will utilize  pacing and fluency strategies to decrease occurrences of sound, syllable, and word repetitions and blocks to 50% of utterances given minimal verbal/visual cues.   Goal Start: 7/3/2024  Anticipated End: 7/19/2024  Goal End:     In 4 weeks, Tierney will complete generative and confrontational naming activities with 60% accuracy, given minimal verbal/visual cues.   Goal Start: 7/3/2024  Anticipated End: 8/2/2024  Goal End:     In 4 weeks, Tierney will utilize pacing and fluency strategies to decrease occurrences of sound, syllable, and word repetitions and blocks to 75% of utterances given minimal verbal/visual cues.   Goal Start: 7/3/2024  Anticipated End: 8/2/2024  Goal End:     In 6 weeks, Tierney will produce 2-3 word phrases with 60% accuracy given minimal verbal/visual cues.   Goal Start: 7/3/2024  Anticipated End: 8/16/2024  Goal End:     Ongoing caregiver education regarding goals, progress, and home programming.     Subjective   Current Problem:  Tierney is known to this SLP. She was seen in acute care where the [place] Aphasia Screening Tool was administered. She was found to have severe deficits.     At this time, Tierney and family report improvements in communication. Tierney was noted to produce longer utterances this session, though intelligibility  and fluency are still severely impaired.     Most Recent Visit:  SLP Most Recent Visit  SLP Received On: 07/03/24      General Visit Information:  General Information  Chart Reviewed: Yes  Arrival: Family/caregiver present  Caregiver Feedback: Some improvements in speech and motor function has returned to baseline  Reason for Referral: Aphasia unknown origin  Referred By: Soraya Casarez MD  Past Medical History Relevant to Rehab: TIA, CAD s/p stent placement, supraventricular tachyarrhythmia, HLD, RLS, delirium, anemia  Patient Seen During This Visit: Yes  Total Number of Visits : 1  POC Visits: 0/12     Objective       Pain:  Pain Assessment  Pain  Assessment: 0-10  0-10 (Numeric) Pain Score: 0 - No pain  Patient's Stated Pain Goal: No pain      Cognition:  Cognition  Overall Cognitive Status: Impaired  Orientation Level: Oriented X4  Attention: Exceptions to WFL  Divided Attention: Impaired  Sustained Attention: Impaired      Auditory Comprehension:   Auditory Comprehension  Yes/No Questions: Within Functional Limits  Basic Questions: 100% accuracy  Complex Questions: 100% accuracy  Commands: Impaired  One Step Basic Commands: Within Functional Limits  Two Step Basic Commands: Within Functional Limits (Given increased processing time)  Multistep Basic Commands: Impaired  Complex/Abstract Commands: Impaired  Conversation: Impaired  Conversation Comments: paraphasias, fluency disruption, oral groping      Reading Comprehension:  Reading Comprehension  Reading Status: Unable to assess (Not yet targeted - testing not completed this session)      Verbal:  Verbal Expression  Primary Mode of Expression: Verbal  Primary Language: English  Confrontation Naming: Impaired  Confrontation Objects: Impaired  Confrontation Naming Comments: 38% accuracy - unable to verbalize names  Responsive Naming: Unable to assess (Not yet completed)  Repetition: Impaired  Repetition Comments: 36% accuracy  Sentence Completion: Unable to assess (Not yet completed)  Open Ended Questions: Impaired  Conversation: Impaired  Affect: Impaired  Prosody: Impaired  Eye Contact: Impaired  Topic Initiation: Impaired  Topic Maintenance: Within Functional Limits  Turn Taking: Impaired      Written Expression:  Written Expression  Written Expression: Unable to assess (Not yet completed)      SLP Outcome Measures:  The Western Aphasia Battery, Revised (WAB-R) was administered in order to assess [Fn] 's language function following an acquired neurological disorder. The WAB-R measures both linguistic and non-linguistics skills including speech content, fluency, auditory comprehension, repetition, naming,  reading, writing, drawing, calculation, block design and apraxia. The composite scores obtained are an Aphasia Quotient, Language Quotient, and Cortical Quotient.     The purpose of the WAB-R is to determine the presence, severity, and type of aphasia; measure the patient's level of performance to provide a baseline for detecting any change over time; and provide comprehensive assessment of the patient's language assets and deficits in order to guide treatment and management.    Tierney received the following scores:    SUBCATEGORIES  Spontaneous Speech: 6/20  Auditory Verbal Comprehension: 14.8/20  Repetition: 3.6/10  Naming and Word Finding: TBD - testing ongoing     Aphasia Quotient: TBD - testing ongoing  Aphasia Type: TBD - testing ongoing     Testing was not completed at this time - to be completed in upcoming sessions.     Outpatient Education:  Adult Outpatient Education  Individual(s) Educated: Patient, Spouse, Child  Verbal Education : Observations, recommendations  Risk and Benefits Discussed with Patient/Caregiver/Other: yes  Patient/Caregiver Demonstrated Understanding: yes  Plan of Care Discussed and Agreed Upon: yes  Patient Response to Education: Patient/Caregiver Verbalized Understanding of Information

## 2024-07-04 LAB — HOLD SPECIMEN: NORMAL

## 2024-07-05 ENCOUNTER — TELEPHONE (OUTPATIENT)
Dept: PRIMARY CARE | Facility: CLINIC | Age: 84
End: 2024-07-05
Payer: MEDICARE

## 2024-07-05 DIAGNOSIS — N30.00 ACUTE CYSTITIS WITHOUT HEMATURIA: Primary | ICD-10-CM

## 2024-07-05 DIAGNOSIS — R30.0 DYSURIA: Primary | ICD-10-CM

## 2024-07-05 DIAGNOSIS — G25.81 RESTLESS LEG SYNDROME: ICD-10-CM

## 2024-07-05 RX ORDER — PRAMIPEXOLE DIHYDROCHLORIDE 0.5 MG/1
1 TABLET ORAL DAILY
Qty: 60 TABLET | Refills: 11 | Status: SHIPPED | OUTPATIENT
Start: 2024-07-05 | End: 2025-07-05

## 2024-07-05 RX ORDER — NITROFURANTOIN 25; 75 MG/1; MG/1
100 CAPSULE ORAL 2 TIMES DAILY
Qty: 14 CAPSULE | Refills: 0 | Status: SHIPPED | OUTPATIENT
Start: 2024-07-05 | End: 2024-07-12

## 2024-07-05 NOTE — TELEPHONE ENCOUNTER
Spoke with daughter. [place], but she is going to have her step dad or brother  the medication. She voiced understanding and had no further questions.

## 2024-07-05 NOTE — TELEPHONE ENCOUNTER
DAUGHTER CALLED AND SAID HER MOM HAS A UTI AND WAS WONDERING IF THEY ARE GOING TO GET A ANTIBIOTIC? ALVIN PHONE NUMBER [phone]. THANK YOU.

## 2024-07-06 LAB — BACTERIA UR CULT: ABNORMAL

## 2024-07-08 ENCOUNTER — OFFICE VISIT (OUTPATIENT)
Dept: CARDIOLOGY | Facility: CLINIC | Age: 84
End: 2024-07-08
Payer: MEDICARE

## 2024-07-08 ENCOUNTER — APPOINTMENT (OUTPATIENT)
Dept: CARDIOLOGY | Facility: CLINIC | Age: 84
End: 2024-07-08
Payer: MEDICARE

## 2024-07-08 VITALS
HEIGHT: 64 IN | SYSTOLIC BLOOD PRESSURE: 136 MMHG | BODY MASS INDEX: 21.34 KG/M2 | WEIGHT: 125 LBS | DIASTOLIC BLOOD PRESSURE: 60 MMHG | OXYGEN SATURATION: 95 % | HEART RATE: 79 BPM

## 2024-07-08 DIAGNOSIS — G93.41 METABOLIC ENCEPHALOPATHY: ICD-10-CM

## 2024-07-08 DIAGNOSIS — I65.21 STENOSIS OF RIGHT CAROTID ARTERY: ICD-10-CM

## 2024-07-08 DIAGNOSIS — R47.01 APHASIA OF UNKNOWN ORIGIN: Primary | ICD-10-CM

## 2024-07-08 DIAGNOSIS — I25.10 CORONARY ARTERY DISEASE INVOLVING NATIVE CORONARY ARTERY OF NATIVE HEART WITHOUT ANGINA PECTORIS: Primary | ICD-10-CM

## 2024-07-08 DIAGNOSIS — I10 PRIMARY HYPERTENSION: ICD-10-CM

## 2024-07-08 DIAGNOSIS — E78.5 HYPERLIPIDEMIA, UNSPECIFIED HYPERLIPIDEMIA TYPE: ICD-10-CM

## 2024-07-08 DIAGNOSIS — R07.89 OTHER CHEST PAIN: ICD-10-CM

## 2024-07-08 PROCEDURE — 1159F MED LIST DOCD IN RCRD: CPT

## 2024-07-08 PROCEDURE — 99215 OFFICE O/P EST HI 40 MIN: CPT

## 2024-07-08 PROCEDURE — 93000 ELECTROCARDIOGRAM COMPLETE: CPT

## 2024-07-08 PROCEDURE — 3075F SYST BP GE 130 - 139MM HG: CPT

## 2024-07-08 PROCEDURE — 1036F TOBACCO NON-USER: CPT

## 2024-07-08 PROCEDURE — 1111F DSCHRG MED/CURRENT MED MERGE: CPT

## 2024-07-08 PROCEDURE — 3078F DIAST BP <80 MM HG: CPT

## 2024-07-08 NOTE — PROGRESS NOTES
[facility]  Cardiology Clinic Visit Note    This is a 84 y.o. female never smoker with a history of coronary artery disease, hypertension, hyperlipidemia, and metabolic encephalopathy who presents to the clinic for hospital and PCI follow up.     She was admitted to [facility] on June 24, 2024 for outpatient coronary angiography/percutaneous core intervention that was complicated by acute altered mental status and strokelike symptoms including left hemiplegia and slurred speech following the procedure.  CT scans and MRIs showed no evidence of acute hemorrhagic or ischemic stroke.  Likely diagnosis for the acute event was metabolic encephalopathy secondary to IV sedation.  She was discharged home with therapy on June 28 with a vast improvement in her neurologic status, she had some residual slurred speech but recovered from the left plegia.    She is accompanied today by her daughter-in-law.  Tierney is still having difficulty getting words out but is vastly improved from when I saw her in the hospital.  She is working with physical, speech and occupational therapy at home.  She is very nervous and does not want to go back in the hospital.      After talking with her daughter-in-law Tierney began to complain of chest pain, lightheadedness and clamminess.  Her blood pressure at beginning visit was 136/60 and I rechecked it and it was 80/40. She had has nothing to eat today.  She was given water snacks in office, EKG showed normal sinus rhythm with no acute ischemic changes and repeat blood pressure was 98/55.  Patient feel much better and no longer dizzy or lightheaded and chest pain is gone.  I encouraged her family to reach out to us if she has any of these other episodes.  Patient very adamant about not wanting to go to the emergency department despite my initial recommendation.     Active Issues  Coronary artery disease   Hyperlipidemia  -6/24/2024 status post PCI to LAD with RONALD x 2  and PCI to RCA with RONALD x 1  -Dual antiplatelet therapy with aspirin and Plavix  -High intensity statin with rosuvastatin 20 mg daily  -Carvedilol 12.5 mg BID and Imdur 60 mg antianginal.   -Heart cath sites right radial and right femoral show mild bruising but no hematoma are nontender with no drainage.    Hypertension  -Currently taking amlodipine 10 mg daily, carvedilol 12.5 mg BID, and lisinopril 20 mg daily    Carotid artery disease  -MRI of the neck shows 6% stenosis right internal carotid artery and no hemodynamically significant stenosis involving the left internal carotid artery.    Metabolic encephalopathy  -likely related to sedation during procedure, ongoing resolution.     Past Medical History  Past Medical History:   Diagnosis Date    Acute myocardial infarction, unspecified (Multi)     Acute myocardial infarction    Tinea unguium 09/12/2022    Onychomycosis of toenail       Past Surgical History  Past Surgical History:   Procedure Laterality Date    CARDIAC CATHETERIZATION N/A 6/24/2024    Procedure: Left Heart Cath;  Surgeon: Ishaan Rocha MD;  Location: [facility] Cardiac Cath Lab;  Service: Cardiovascular;  Laterality: N/A;  8am    CARDIAC CATHETERIZATION N/A 6/24/2024    Procedure: PCI;  Surgeon: Ishaan Rocha MD;  Location: [facility] Cardiac Cath Lab;  Service: Cardiovascular;  Laterality: N/A;    CARDIAC CATHETERIZATION N/A 6/24/2024    Procedure: IVUS - Coronary;  Surgeon: Ishaan Rocha MD;  Location: [facility] Cardiac Cath Lab;  Service: Cardiovascular;  Laterality: N/A;    OTHER SURGICAL HISTORY  11/15/2019    Hip replacement    OTHER SURGICAL HISTORY  11/15/2019    Lumbar laminectomy    OTHER SURGICAL HISTORY  11/15/2019    Colonoscopy    OTHER SURGICAL HISTORY  12/01/2022    Cardiac catheterization with stent placement       Medications  Current Outpatient Medications   Medication Instructions    acetaminophen (TYLENOL) 1,000 mg, oral, Every 8 hours PRN    amLODIPine  (NORVASC) 10 mg, oral, Daily    aspirin 81 mg, oral, Daily    carvedilol (COREG) 12.5 mg, oral, 2 times daily (morning and late afternoon), other    cetirizine (ZYRTEC) 5 mg, oral, Daily    clopidogrel (PLAVIX) 75 mg, oral, Daily    fluticasone (Flonase) 50 mcg/actuation nasal spray 1 spray, Each Nostril, 2 times daily, Instill 1 squirt    furosemide (LASIX) 20 mg, oral, Daily    isosorbide mononitrate ER (IMDUR) 60 mg, oral, Daily    lisinopril 20 mg, oral, Daily    naloxone (NARCAN) 4 mg, nasal, As needed    nitrofurantoin, macrocrystal-monohydrate, (Macrobid) 100 mg capsule 100 mg, oral, 2 times daily    nitroglycerin (NITROSTAT) 0.4 mg, sublingual, Every 5 min PRN, For up to 3 doses as needed for chest pain. Call 911 if pain persists    oxyCODONE (ROXICODONE) 10 mg, oral, 3 times daily    pantoprazole (PROTONIX) 40 mg, oral, Daily    pramipexole (MIRAPEX) 1 mg, oral, Daily    rosuvastatin (CRESTOR) 20 mg, oral, Daily    sennosides-docusate sodium (Katya-Colace) 8.6-50 mg tablet 2 tablets, oral, Daily PRN    sertraline (ZOLOFT) 25 mg, oral, Daily       Allergies  Allergies   Allergen Reactions    Diazepam Unknown, Other and Seizure     Causes - Seizures    Gabapentin Unknown and Other    Morphine Confusion    Promethazine Unknown    Zolpidem Unknown, Insomnia and Other     Insomnia    Doxycycline Nausea/vomiting       Social History  Social History     Tobacco Use    Smoking status: Never    Smokeless tobacco: Never   Vaping Use    Vaping status: Never Used   Substance Use Topics    Alcohol use: Not Currently    Drug use: Never       Family History  Family History   Problem Relation Name Age of Onset    Heart disease Mother      Hypertension Mother      Lung cancer Mother      Clotting disorder Father      Heart disease Father      Stroke Father      Heart failure Father      Hypertension Father         VITALS  There were no vitals filed for this visit.    Weight  There were no vitals filed for this  visit.    PHYSICAL EXAM  Physical Exam  Vitals and nursing note reviewed.   Constitutional:       General: She is not in acute distress.  HENT:      Head: Normocephalic and atraumatic.      Mouth/Throat:      Mouth: Mucous membranes are moist.      Pharynx: Oropharynx is clear.   Eyes:      General: No scleral icterus.     Pupils: Pupils are equal, round, and reactive to light.   Cardiovascular:      Rate and Rhythm: Normal rate and regular rhythm.      Pulses: Normal pulses.      Heart sounds: Normal heart sounds, S1 normal and S2 normal. No murmur heard.     No friction rub.   Pulmonary:      Effort: Pulmonary effort is normal.      Breath sounds: Normal breath sounds.   Abdominal:      General: Bowel sounds are normal. There is no distension.      Palpations: Abdomen is soft.      Tenderness: There is no abdominal tenderness.   Musculoskeletal:         General: No swelling. Normal range of motion.      Cervical back: Normal range of motion and neck supple.      Right lower leg: No edema.      Left lower leg: No edema.   Skin:     General: Skin is warm and dry.      Capillary Refill: Capillary refill takes less than 2 seconds.      Findings: No rash.   Neurological:      General: No focal deficit present.      Mental Status: She is alert.   Psychiatric:         Mood and Affect: Mood normal.         Behavior: Behavior normal.       Cardiovascular Imaging/Procedures/Labs  Lab Results   Component Value Date    HGB 9.2 (L) 06/28/2024    HGB 10.1 (L) 06/26/2024    HGB 15.9 06/25/2024     06/28/2024    WBC 6.0 06/28/2024     06/28/2024    K 3.8 06/28/2024    CREATININE 0.73 06/28/2024    CREATININE 1.06 (H) 06/26/2024    CREATININE 0.80 06/25/2024    BUN 19 06/28/2024    CALCIUM 8.2 (L) 06/28/2024    INR 1.0 06/17/2024    BNP 97 06/24/2024    TROPHS 886 (HH) 06/26/2024    TROPHS 4 05/20/2024    TROPHS 4 02/13/2024       Assessment  Coronary artery disease   Hyperlipidemia  -6/24/2024 status post PCI to LAD  with RONALD x 2 and PCI to RCA with RONALD x 1  -Dual antiplatelet therapy with aspirin and Plavix  -High intensity statin with rosuvastatin 20 mg daily  -Carvedilol 12.5 mg BID and Imdur 60 mg antianginal.   -Heart cath sites right radial and right femoral show mild bruising but no hematoma are nontender with no drainage.    Hypertension  -Currently taking amlodipine 10 mg daily, carvedilol 12.5 mg BID, and lisinopril 20 mg daily    Carotid artery disease  -MRI of the neck shows 60% stenosis right internal carotid artery and no hemodynamically significant stenosis involving the left internal carotid artery.    Metabolic encephalopathy  -likely related to sedation during procedure, ongoing resolution.     Plan  Continue current regimen.  Encourage her to eat and stay hydrated. Keep up with therapy.     Return to Care:  3 months with Dr. Rocha.     If your symptoms worsen or progress please go directory to your nearest emergency department for evaluation.     Thank you for this interesting clinical case and allowing me to participate in the care of this patient. Please reach me out if you have any questions or if you need any clarifications regarding this patient's care.    **Disclaimer: This note was dictated by speech recognition, and every effort has been made to prevent any error in transcription, however minor errors may be present**  ___________________________________________________  Carlitos Dewitt, MSN, CNP, ACNPC, CCRN  Division of Cardiovascular Medicine  [facility]  [facility]

## 2024-07-09 ENCOUNTER — APPOINTMENT (OUTPATIENT)
Dept: PRIMARY CARE | Facility: CLINIC | Age: 84
End: 2024-07-09
Payer: MEDICARE

## 2024-07-09 VITALS
OXYGEN SATURATION: 96 % | WEIGHT: 127 LBS | SYSTOLIC BLOOD PRESSURE: 130 MMHG | HEART RATE: 74 BPM | DIASTOLIC BLOOD PRESSURE: 74 MMHG | HEIGHT: 64 IN | BODY MASS INDEX: 21.68 KG/M2

## 2024-07-09 DIAGNOSIS — I25.10 CORONARY ARTERY DISEASE INVOLVING NATIVE CORONARY ARTERY OF NATIVE HEART WITHOUT ANGINA PECTORIS: ICD-10-CM

## 2024-07-09 DIAGNOSIS — N30.00 ACUTE CYSTITIS WITHOUT HEMATURIA: Primary | ICD-10-CM

## 2024-07-09 DIAGNOSIS — G93.41 METABOLIC ENCEPHALOPATHY: ICD-10-CM

## 2024-07-09 DIAGNOSIS — R21 RASH: ICD-10-CM

## 2024-07-09 PROCEDURE — 1111F DSCHRG MED/CURRENT MED MERGE: CPT

## 2024-07-09 PROCEDURE — 1159F MED LIST DOCD IN RCRD: CPT

## 2024-07-09 PROCEDURE — 3078F DIAST BP <80 MM HG: CPT

## 2024-07-09 PROCEDURE — 3075F SYST BP GE 130 - 139MM HG: CPT

## 2024-07-09 PROCEDURE — 1036F TOBACCO NON-USER: CPT

## 2024-07-09 PROCEDURE — 99495 TRANSJ CARE MGMT MOD F2F 14D: CPT

## 2024-07-09 RX ORDER — HYDROXYZINE HYDROCHLORIDE 10 MG/1
TABLET, FILM COATED ORAL
Qty: 20 TABLET | Refills: 10 | Status: SHIPPED | OUTPATIENT
Start: 2024-07-09

## 2024-07-09 RX ORDER — AMOXICILLIN AND CLAVULANATE POTASSIUM 500; 125 MG/1; MG/1
500 TABLET, FILM COATED ORAL 2 TIMES DAILY
Qty: 14 TABLET | Refills: 0 | Status: SHIPPED | OUTPATIENT
Start: 2024-07-09 | End: 2024-07-16

## 2024-07-09 ASSESSMENT — ENCOUNTER SYMPTOMS
RESPIRATORY NEGATIVE: 1
GASTROINTESTINAL NEGATIVE: 1
CARDIOVASCULAR NEGATIVE: 1
CONSTITUTIONAL NEGATIVE: 1

## 2024-07-09 NOTE — PROGRESS NOTES
Subjective   Patient ID: Tierney Madrid is a 84 y.o. female who presents for pt here due to hospital follow up  (Pt also has a rash on legs due to reaction to medication).    HPI   Here with  and daughter for hospital follow up.  Hospital Course:   84 y.o. female with a past medical history of coronary artery disease with status post stents, supraventricular tachyarrhythmia, hypertension, hyperlipidemia, restless leg syndrome presented to the Cath Lab today for LHC due to worsening dyspnea on exertion and substernal chest discomfort.  Patient ended up having cath with s/p successful PCI to distal and proximal LAD using 2 stents and 1 to ostial/proximal RCA using RONALD.  During procedure patient's blood pressure systolic was running high in 240, got dose of hydralazine, Norvasc 5 mg, Plavix 300 mg and lisinopril 20 mg.  During the procedure patient got fentanyl 125 mcg and Versed 2.5 mg for sedation.  However patient was able awake partially during the procedure.  Postprocedure patient was confused, was given 4 Narcan and 2 Romazicon.  Postprocedure patient also had a blood sugar of 75, was given 1 ampoule of D50.  Follow-up blood sugar 150.  She was also complaining of some chest pain, was given nitroglycerin sublingual, which resulted in her blood pressure dropping into 120.  At this point patient became more confused, stroke alert was called.  Initial CT of head was negative for acute process.  Neurology was consulted.  Patient was deemed not a candidate for endovascular treatment, NIH score of 9.  Neurology recommended MRI with stroke protocol, MRA head and neck.  Continue on dual platelet and diet therapy.  Patient currently alert and awake and responds to her name, does not follow any further commands.  Current blood pressure was 208/19.  O2 sats 98% on room air.  Pulse 95.  Respirations 15.     During the course of hospitalization, patient was evaluated for acute confusion with CT of brain, negative for  acute process.  MRI of brain negative for acute process.  Was started on stroke protocol.  Neurochecks were normal.  Significant improvement other than significant aphasia, expressive.  Worked with speech.  Initially she was incontinent for oral intake, NG tube was placed to start her on cardiac medications post cath.  Once she became more awake and alert, speech evaluated and NG was removed and diet was restarted.  Patient continues to improve every day, requiring PT and OT and speech.  Per family request patient is discharged home to do outpatient aggressive rehab.  Patient was on aspirin and Plavix and statin which were continued during hospitalization and posthospitalization.  Patient s/p cardiac cath with 3 stents on the day of admission, was on dual antiplatelet along with atorvastatin and Coreg which will be continued.  She will do a follow-up with cardiology.  Currently asymptomatic.  Her confusion on admission, post delirious secondary due to medications, was ruled out stroke.  EEG was ordered could not be done at this facility.  Case was discussed with neurology since her initial admission with Dr. Cox, appreciate her input during the whole hospitalization and follow-ups.  Her chronic medical conditions remained to be stable.  Patient, very coherent, spouse and children in room verbalized agree with the above plan.    Currently:   She seems to have made remarkable recovery thus far, ambulating with cane, speech is stuttered but improving, she does have appt with speech therapy tomorrow, saw cardio yesterday and had a hypotensive and possible hypoglycemic episode which resolved with snacks/water, today her pressure is great along with heart rate and pulse ox.     She was being treated for UTI with macrobid, but has had a reaction of itchy rash to BL LE, she stopped this medication yesterday morning, still with intermittent UTI symptoms.    Review of Systems   Constitutional: Negative.    Respiratory:  Negative.     Cardiovascular: Negative.    Gastrointestinal: Negative.        Objective   There were no vitals taken for this visit.    Physical Exam  Constitutional:       General: She is not in acute distress.     Appearance: Normal appearance. She is not ill-appearing.   HENT:      Head: Normocephalic and atraumatic.   Eyes:      Extraocular Movements: Extraocular movements intact.      Conjunctiva/sclera: Conjunctivae normal.   Cardiovascular:      Rate and Rhythm: Normal rate.   Pulmonary:      Effort: Pulmonary effort is normal.   Abdominal:      General: There is no distension.   Musculoskeletal:         General: Normal range of motion.   Skin:     General: Skin is warm and dry.   Neurological:      General: No focal deficit present.      Mental Status: She is alert and oriented to person, place, and time.      Sensory: No sensory deficit.      Motor: No weakness.   Psychiatric:         Mood and Affect: Mood normal.         Behavior: Behavior normal.         Thought Content: Thought content normal.         Judgment: Judgment normal.         Assessment/Plan        Seems to being doing quite well, will continue therapy.  Family asked about neurology consult, will hold for now as imaging was negative brain insult.   Discussed dietary recommendations for protein intake and increasing vitamins and minerals in diet and supplements.   Will Rx Augmentin for UTI, advised get urine collected a few days after finishing abx.  Rx low dose hydroxyzine prn itch, advised calamine lotion as well, rash should resolve in next few days.  Follow up with Dr. Leggett in about 1 month.

## 2024-07-10 ENCOUNTER — TREATMENT (OUTPATIENT)
Dept: SPEECH THERAPY | Facility: CLINIC | Age: 84
End: 2024-07-10
Payer: MEDICARE

## 2024-07-10 DIAGNOSIS — F98.5 ADULT ONSET FLUENCY DISORDER: ICD-10-CM

## 2024-07-10 DIAGNOSIS — R48.2 APRAXIA OF SPEECH: ICD-10-CM

## 2024-07-10 DIAGNOSIS — R47.01 APHASIA OF UNKNOWN ORIGIN: ICD-10-CM

## 2024-07-10 PROCEDURE — 92507 TX SP LANG VOICE COMM INDIV: CPT | Mod: GN

## 2024-07-10 ASSESSMENT — PAIN - FUNCTIONAL ASSESSMENT: PAIN_FUNCTIONAL_ASSESSMENT: 0-10

## 2024-07-10 ASSESSMENT — PAIN SCALES - GENERAL: PAINLEVEL_OUTOF10: 0 - NO PAIN

## 2024-07-10 NOTE — PROGRESS NOTES
Speech-Language Pathology    SLP Adult Outpatient Speech-Language Pathology Treatment     Patient Name: Tierney Madrid  MRN: [id number]  Today's Date: 7/10/2024     Time Calculation  Start Time: 1315  Stop Time: 1405  Time Calculation (min): 50 min      Current Problem:   1. Aphasia of unknown origin [R47.01]  Follow Up In Speech Therapy      2. Adult onset fluency disorder  Follow Up In Speech Therapy      3. Apraxia of speech  Follow Up In Speech Therapy            SLP Assessment:  SLP TX Intervention Outcome: Making Progress Towards Goals  Prognosis: Fair  Treatment Tolerance: Patient tolerated treatment well  Barriers: None       Plan:  Inpatient/Swing Bed or Outpatient: Outpatient  SLP TX Plan: Continue Plan of Care  SLP Frequency: 3x per week  Duration:  (60 days)  SLP Discharge Recommendations:  (TBD)  Discussed POC: Patient, Caregiver/family  Discussed Risks/Benefits: Yes  Patient/Caregiver Agreeable: Yes      Subjective   Tierney Madrid arrived with their spouse, son, and daughter in law who accompanied them back. No concerns reported at this time.      Most Recent Visit:  SLP Received On: 07/10/24    General Visit Information:   Referred By: Soraya Del Valle  Past Medical History Relevant to Rehab: TIA, CAD s/p stent placement, supraventricular tachyarrhythmia, HLD, RLS, delirium, anemia  Caregiver Feedback: Some improvements in speech and motor function has returned to baseline  Number of Authorized Treatments : 5  Total Number of Visits : 1      Pain Assessment:   Pain Assessment: 0-10  0-10 (Numeric) Pain Score: 0 - No pain  Patient's Stated Pain Goal: No pain      Objective   Long Term Goal:  In 60 days, Tierney will demonstrate adequate speech and language skills for functional communication of wants and needs in variety contexts with 75% accuracy independently              Goal Start: 7/3/2024                Anticipated End: 9/3/2024                  Goal End:       Short Term Goal(s):  In 2 weeks, Tierney  will utilize compensatory strategies to repair communication break downs and decreased frustration with communication with 80% accuracy independently.               Goal Start: 7/3/2024                            Anticipated End: 7/19/2024                Goal End:      In 2 weeks, Tierney will utilize pacing and fluency strategies to decrease occurrences of sound, syllable, and word repetitions and blocks to 50% of utterances given minimal verbal/visual cues. PROGRESSING- TIERNEY was taught easy onset to decrease repetitions. She used easy onset with single words with 50% accuracy with MOD verbal cues.               Goal Start: 7/3/2024                Anticipated End: 7/19/2024                Goal End:      In 4 weeks, Tierney will complete generative and confrontational naming activities with 60% accuracy, given minimal verbal/visual cues.              Goal Start: 7/3/2024                Anticipated End: 8/2/2024                  Goal End:      In 4 weeks, Tierney will utilize pacing and fluency strategies to decrease occurrences of sound, syllable, and word repetitions and blocks to 75% of utterances given minimal verbal/visual cues.              Goal Start: 7/3/2024                Anticipated End: 8/2/2024                  Goal End:      In 6 weeks, Tierney will produce 2-3 word phrases with 60% accuracy given minimal verbal/visual cues.               Goal Start: 7/3/2024                Anticipated End: 8/16/2024                Goal End:      Ongoing caregiver education regarding goals, progress, and home programming.        Treatment Data  TIERNEY completed the WAB from the previous session.  TIERNEY completed confrontational naming task 4/5 opportunities or with 80% accuracy.  TIERNEY produced words without stutter or blocks with easy onset with a model and MOD verbal cues with 50% accuracy.     Family inquired about UTI and the affects on speech.     SLP Outcome Measures:  The Western Aphasia Battery, Revised (WAB-R)  was administered in order to assess [Fn] 's language function following an acquired neurological disorder. The WAB-R measures both linguistic and non-linguistics skills including speech content, fluency, auditory comprehension, repetition, naming, reading, writing, drawing, calculation, block design and apraxia. The composite scores obtained are an Aphasia Quotient, Language Quotient, and Cortical Quotient.      The purpose of the WAB-R is to determine the presence, severity, and type of aphasia; measure the patient's level of performance to provide a baseline for detecting any change over time; and provide comprehensive assessment of the patient's language assets and deficits in order to guide treatment and management.     Tierney received the following scores:     SUBCATEGORIES  Spontaneous Speech: 6/20  Auditory Verbal Comprehension: 14.8/20  Repetition: 3.6/10  Naming and Word Finding: 3.9/10     Aphasia Quotient: 41.8  Aphasia Type: Conduction Aphasia     Outpatient Education:  Adult Outpatient Education  Individual(s) Educated: Patient, Spouse, Child  Written Education : home practice  Verbal Education : Observations, recommendations  Risk and Benefits Discussed with Patient/Caregiver/Other: yes  Patient/Caregiver Demonstrated Understanding: yes  Plan of Care Discussed and Agreed Upon: yes  Patient Response to Education: Patient/Caregiver Verbalized Understanding of Information

## 2024-07-11 ENCOUNTER — TELEPHONE (OUTPATIENT)
Dept: PRIMARY CARE | Facility: CLINIC | Age: 84
End: 2024-07-11
Payer: MEDICARE

## 2024-07-11 DIAGNOSIS — R30.0 DYSURIA: Primary | ICD-10-CM

## 2024-07-11 RX ORDER — CIPROFLOXACIN 500 MG/1
500 TABLET ORAL 2 TIMES DAILY
Qty: 14 TABLET | Refills: 0 | Status: SHIPPED | OUTPATIENT
Start: 2024-07-11 | End: 2024-07-18

## 2024-07-11 NOTE — TELEPHONE ENCOUNTER
Left voicemail for patient that medication was sent to pharmacy and to call our office back with any questions she may have.

## 2024-07-11 NOTE — TELEPHONE ENCOUNTER
----- Message from Sami Leggett sent at 7/11/2024  3:44 PM EDT -----  Let pt know she has a uti and cipro was called into [facility]

## 2024-07-12 ENCOUNTER — PATIENT OUTREACH (OUTPATIENT)
Dept: PRIMARY CARE | Facility: CLINIC | Age: 84
End: 2024-07-12
Payer: MEDICARE

## 2024-07-12 NOTE — PROGRESS NOTES
Call regarding appt. with PCP on 7/9/2024 after hospitalization.  At time of outreach call the patient feels as if their condition has improved since last visit.  Reviewed the PCP appointment with the pt and addressed any questions or concerns.

## 2024-07-15 ENCOUNTER — APPOINTMENT (OUTPATIENT)
Dept: RADIOLOGY | Facility: HOSPITAL | Age: 84
End: 2024-07-15
Payer: MEDICARE

## 2024-07-15 ENCOUNTER — HOSPITAL ENCOUNTER (EMERGENCY)
Facility: HOSPITAL | Age: 84
Discharge: OTHER INSTITUTION | End: 2024-07-16
Attending: EMERGENCY MEDICINE
Payer: MEDICARE

## 2024-07-15 DIAGNOSIS — M97.8XXA PERIPROSTHETIC FRACTURE OF FEMUR FOLLOWING TOTAL REPLACEMENT OF HIP, INITIAL ENCOUNTER: ICD-10-CM

## 2024-07-15 DIAGNOSIS — Z96.649 PERIPROSTHETIC FRACTURE OF FEMUR FOLLOWING TOTAL REPLACEMENT OF HIP, INITIAL ENCOUNTER: ICD-10-CM

## 2024-07-15 DIAGNOSIS — S32.502A: Primary | ICD-10-CM

## 2024-07-15 PROCEDURE — 72192 CT PELVIS W/O DYE: CPT | Performed by: RADIOLOGY

## 2024-07-15 PROCEDURE — 73502 X-RAY EXAM HIP UNI 2-3 VIEWS: CPT | Mod: LEFT SIDE | Performed by: RADIOLOGY

## 2024-07-15 PROCEDURE — 73502 X-RAY EXAM HIP UNI 2-3 VIEWS: CPT | Mod: LT

## 2024-07-15 PROCEDURE — 72192 CT PELVIS W/O DYE: CPT

## 2024-07-15 PROCEDURE — 99285 EMERGENCY DEPT VISIT HI MDM: CPT | Mod: 25

## 2024-07-15 ASSESSMENT — COLUMBIA-SUICIDE SEVERITY RATING SCALE - C-SSRS
2. HAVE YOU ACTUALLY HAD ANY THOUGHTS OF KILLING YOURSELF?: NO
1. IN THE PAST MONTH, HAVE YOU WISHED YOU WERE DEAD OR WISHED YOU COULD GO TO SLEEP AND NOT WAKE UP?: NO
6. HAVE YOU EVER DONE ANYTHING, STARTED TO DO ANYTHING, OR PREPARED TO DO ANYTHING TO END YOUR LIFE?: NO

## 2024-07-15 ASSESSMENT — PAIN SCALES - GENERAL: PAINLEVEL_OUTOF10: 10 - WORST POSSIBLE PAIN

## 2024-07-16 ENCOUNTER — APPOINTMENT (OUTPATIENT)
Dept: RADIOLOGY | Facility: HOSPITAL | Age: 84
End: 2024-07-16
Payer: MEDICARE

## 2024-07-16 VITALS
HEART RATE: 87 BPM | HEIGHT: 64 IN | SYSTOLIC BLOOD PRESSURE: 138 MMHG | RESPIRATION RATE: 18 BRPM | OXYGEN SATURATION: 95 % | DIASTOLIC BLOOD PRESSURE: 79 MMHG | BODY MASS INDEX: 21.68 KG/M2 | WEIGHT: 127 LBS | TEMPERATURE: 98.6 F

## 2024-07-16 PROCEDURE — 2500000001 HC RX 250 WO HCPCS SELF ADMINISTERED DRUGS (ALT 637 FOR MEDICARE OP): Performed by: EMERGENCY MEDICINE

## 2024-07-16 PROCEDURE — 70450 CT HEAD/BRAIN W/O DYE: CPT

## 2024-07-16 PROCEDURE — 72125 CT NECK SPINE W/O DYE: CPT

## 2024-07-16 PROCEDURE — 72125 CT NECK SPINE W/O DYE: CPT | Performed by: RADIOLOGY

## 2024-07-16 PROCEDURE — 70450 CT HEAD/BRAIN W/O DYE: CPT | Performed by: RADIOLOGY

## 2024-07-16 RX ORDER — OXYCODONE AND ACETAMINOPHEN 5; 325 MG/1; MG/1
1 TABLET ORAL ONCE
Status: COMPLETED | OUTPATIENT
Start: 2024-07-16 | End: 2024-07-16

## 2024-07-16 ASSESSMENT — PAIN SCALES - GENERAL: PAINLEVEL_OUTOF10: 10 - WORST POSSIBLE PAIN

## 2024-07-16 NOTE — ED PROVIDER NOTES
HPI   Chief Complaint   Patient presents with    Hip Pain     Patient was using walker at 10am this morning, reports she lost balance and fell onto L hip. Pt reports she got up a few mins later with help of her . Denies hitting head or LOC. Pain in L hip radiating down to the knee, pain 10/10. Hx of osteoporosis       84-year-old female presents via ambulance after falling using her walker this morning.  Patient is complaining of moderate pain in the left hip.  Patient denies any other associated injury with presenting complaint.  Patient states she had left hip surgery in [place] in 2017.  I did speak to the patient and family about the CT and x-ray findings.  I did speak to Dr. Abdi about the case.  He wants the patient transferred to [place] for further evaluation.  Patient will be given a Percocet prior to discharge.  I will speak with the trauma NP on-call.  The patient will be sent to the ED and the accepting trauma surgeon will be Dr. Fine.  The nurse practitioner wanted a CT of head and neck prior to transfer.  I have explained all of this to the family.      History provided by:  Patient and relative                      Yazan Coma Scale Score: 15                     Patient History   Past Medical History:   Diagnosis Date    Acute myocardial infarction, unspecified (Multi)     Acute myocardial infarction    Tinea unguium 09/12/2022    Onychomycosis of toenail     Past Surgical History:   Procedure Laterality Date    CARDIAC CATHETERIZATION N/A 6/24/2024    Procedure: Left Heart Cath;  Surgeon: Ishaan Rocha MD;  Location: [facility] Cardiac Cath Lab;  Service: Cardiovascular;  Laterality: N/A;  8am    CARDIAC CATHETERIZATION N/A 6/24/2024    Procedure: PCI;  Surgeon: Ishaan Rocha MD;  Location: [facility] Cardiac Cath Lab;  Service: Cardiovascular;  Laterality: N/A;    CARDIAC CATHETERIZATION N/A 6/24/2024    Procedure: IVUS - Coronary;  Surgeon: Ishaan Rocha MD;   Location: [facility] Cardiac Cath Lab;  Service: Cardiovascular;  Laterality: N/A;    OTHER SURGICAL HISTORY  11/15/2019    Hip replacement    OTHER SURGICAL HISTORY  11/15/2019    Lumbar laminectomy    OTHER SURGICAL HISTORY  11/15/2019    Colonoscopy    OTHER SURGICAL HISTORY  12/01/2022    Cardiac catheterization with stent placement     Family History   Problem Relation Name Age of Onset    Heart disease Mother      Hypertension Mother      Lung cancer Mother      Clotting disorder Father      Heart disease Father      Stroke Father      Heart failure Father      Hypertension Father       Social History     Tobacco Use    Smoking status: Never    Smokeless tobacco: Never   Vaping Use    Vaping status: Never Used   Substance Use Topics    Alcohol use: Not Currently    Drug use: Never       Physical Exam   ED Triage Vitals [07/15/24 2149]   Temperature Heart Rate Respirations BP   37 °C (98.6 °F) (!) 106 18 178/73      Pulse Ox Temp src Heart Rate Source Patient Position   94 % -- -- Sitting      BP Location FiO2 (%)     Right arm --       Physical Exam  Vitals and nursing note reviewed.   Constitutional:       General: She is not in acute distress.     Appearance: Normal appearance. She is well-developed.   HENT:      Head: Normocephalic and atraumatic.   Eyes:      Conjunctiva/sclera: Conjunctivae normal.   Cardiovascular:      Rate and Rhythm: Normal rate and regular rhythm.      Heart sounds: No murmur heard.  Pulmonary:      Effort: Pulmonary effort is normal. No respiratory distress.      Breath sounds: Normal breath sounds.   Abdominal:      Palpations: Abdomen is soft.      Tenderness: There is no abdominal tenderness.   Musculoskeletal:         General: Tenderness and signs of injury present. No swelling.      Cervical back: Neck supple.      Comments: Left hip pain with movement.   Skin:     General: Skin is warm and dry.      Capillary Refill: Capillary refill takes less than 2 seconds.   Neurological:       Mental Status: She is alert.   Psychiatric:         Mood and Affect: Mood normal.       CT pelvis wo IV contrast   Final Result   1.  Status post left hip arthroplasty. Acute, mildly displaced,   periprosthetic fracture at the proximal left femur.   2. Acute nondisplaced fracture at the inferior ramus of the left   pubic bone.   3. Osteopenia.        MACRO:   None        Signed by: Constance Shields 7/15/2024 11:49 PM   Dictation workstation:   [id number]      XR hip left with pelvis when performed 2 or 3 views   Final Result   1.  Periprosthetic fracture of the proximal left femur.   2. Appearance of buckling of the left inferior pubic ramus in 2   locations. Greater degree of fracturing about the pelvis is not   excluded.   3. CT is recommended for further characterization. Given the above   impressions this would likely be best performed as a CT of the pelvis   with inclusion of the left femur to at least just inferior to the tip   the femoral prosthesis.        Signed by: Wesly Kaye 7/15/2024 10:44 PM   Dictation workstation:   [id number]      CT cervical spine wo IV contrast    (Results Pending)   CT head wo IV contrast    (Results Pending)      ED Course & MDM   ED Course as of 07/16/24 0043   Tue Jul 16, 2024   0035 XR hip left with pelvis when performed 2 or 3 views [MS]      ED Course User Index  [MS] Jose Velazquez DO         Diagnoses as of 07/16/24 0043   Closed fracture of left pubis, unspecified portion of pubis, initial encounter (Multi)   Periprosthetic fracture of femur following total replacement of hip, initial encounter       Medical Decision Making  Transfer    Procedure  Procedures     Jose Velazquez DO  07/16/24 0044       Jose Velazquez DO  07/16/24 0204

## 2024-07-17 ENCOUNTER — APPOINTMENT (OUTPATIENT)
Dept: SPEECH THERAPY | Facility: CLINIC | Age: 84
End: 2024-07-17
Payer: MEDICARE

## 2024-07-22 ENCOUNTER — APPOINTMENT (OUTPATIENT)
Dept: SPEECH THERAPY | Facility: CLINIC | Age: 84
End: 2024-07-22
Payer: MEDICARE

## 2024-07-24 ENCOUNTER — APPOINTMENT (OUTPATIENT)
Dept: SPEECH THERAPY | Facility: CLINIC | Age: 84
End: 2024-07-24
Payer: MEDICARE

## 2024-07-26 ENCOUNTER — TELEPHONE (OUTPATIENT)
Dept: CARDIOLOGY | Facility: CLINIC | Age: 84
End: 2024-07-26
Payer: MEDICARE

## 2024-07-26 ENCOUNTER — TELEPHONE (OUTPATIENT)
Dept: PRIMARY CARE | Facility: CLINIC | Age: 84
End: 2024-07-26
Payer: MEDICARE

## 2024-07-26 NOTE — TELEPHONE ENCOUNTER
LMTCB - LET HER KNOW THAT WE DID TALK TO GANESH WHO HAS THE PARAMETERS THAT DR. PHILLIPS WANTED REGARDING HER MEDS

## 2024-07-26 NOTE — TELEPHONE ENCOUNTER
Pt called office and notified of parameters for Coreg and has further questions regarding hospital discharge and medications. Will follow up with nurse at [facility] on Monday.

## 2024-07-26 NOTE — TELEPHONE ENCOUNTER
Patient called in and would like a refill of the following medication.... oxycodone 10 mg called into [facility] pharmacy in [place]

## 2024-07-26 NOTE — TELEPHONE ENCOUNTER
A Jie called regarding Tierney had another episode of light headedness like she had in our office.  She is asking if you want to put any perimeter's to put a hold on her Cervedilol??

## 2024-07-29 ENCOUNTER — APPOINTMENT (OUTPATIENT)
Dept: SPEECH THERAPY | Facility: CLINIC | Age: 84
End: 2024-07-29
Payer: MEDICARE

## 2024-08-02 ENCOUNTER — TELEPHONE (OUTPATIENT)
Dept: PRIMARY CARE | Facility: CLINIC | Age: 84
End: 2024-08-02
Payer: MEDICARE

## 2024-08-02 DIAGNOSIS — I10 PRIMARY HYPERTENSION: ICD-10-CM

## 2024-08-02 RX ORDER — CARVEDILOL 12.5 MG/1
12.5 TABLET ORAL
Qty: 60 TABLET | Refills: 0 | OUTPATIENT
Start: 2024-08-02

## 2024-08-05 ENCOUNTER — TELEPHONE (OUTPATIENT)
Dept: PRIMARY CARE | Facility: CLINIC | Age: 84
End: 2024-08-05
Payer: MEDICARE

## 2024-08-05 NOTE — TELEPHONE ENCOUNTER
Ilana @ [facility] [phone]. Patient was DC from [facility] this weekend & they have requested Skilled nursing PT & OT in her own home Short term & would like Dr Leggett to follow. Please call to advise. They will also need a DOC to start also

## 2024-08-05 NOTE — TELEPHONE ENCOUNTER
Please send refill to  [facility] in [place]  oxyCODONE-acetaminophen (Percocet) 5-325 mg per tablet    For broken bone. Please call her if needed, she is adamant about getting this med

## 2024-08-05 NOTE — TELEPHONE ENCOUNTER
Patient called in requesting medication for being nausous. She said she knows she has an appointment coming up but would really appreciate the meds before the appointment. She would like these called into [facility] pharmacy in [place]

## 2024-08-06 ENCOUNTER — TELEPHONE (OUTPATIENT)
Dept: PRIMARY CARE | Facility: CLINIC | Age: 84
End: 2024-08-06

## 2024-08-06 ENCOUNTER — APPOINTMENT (OUTPATIENT)
Dept: PRIMARY CARE | Facility: CLINIC | Age: 84
End: 2024-08-06
Payer: MEDICARE

## 2024-08-06 NOTE — TELEPHONE ENCOUNTER
Female from [facility] order needs to be followed, please call to see if Dr Leggett will follow, never gave her name

## 2024-08-07 DIAGNOSIS — M48.061 SPINAL STENOSIS OF LUMBAR REGION, UNSPECIFIED WHETHER NEUROGENIC CLAUDICATION PRESENT: Primary | ICD-10-CM

## 2024-08-07 RX ORDER — OXYCODONE HYDROCHLORIDE 10 MG/1
10 TABLET ORAL 3 TIMES DAILY
Qty: 90 TABLET | Refills: 0 | Status: SHIPPED | OUTPATIENT
Start: 2024-08-07 | End: 2024-09-06

## 2024-08-07 RX ORDER — ONDANSETRON 4 MG/1
4 TABLET, FILM COATED ORAL EVERY 8 HOURS PRN
Qty: 20 TABLET | Refills: 0 | Status: SHIPPED | OUTPATIENT
Start: 2024-08-07 | End: 2024-08-14

## 2024-08-08 ENCOUNTER — APPOINTMENT (OUTPATIENT)
Dept: PRIMARY CARE | Facility: CLINIC | Age: 84
End: 2024-08-08
Payer: MEDICARE

## 2024-08-08 VITALS — DIASTOLIC BLOOD PRESSURE: 70 MMHG | HEART RATE: 89 BPM | OXYGEN SATURATION: 100 % | SYSTOLIC BLOOD PRESSURE: 140 MMHG

## 2024-08-08 DIAGNOSIS — G45.9 TIA (TRANSIENT ISCHEMIC ATTACK): ICD-10-CM

## 2024-08-08 DIAGNOSIS — G45.9 TIA (TRANSIENT ISCHEMIC ATTACK): Primary | ICD-10-CM

## 2024-08-08 DIAGNOSIS — S72.365D: ICD-10-CM

## 2024-08-08 DIAGNOSIS — S32.502D: ICD-10-CM

## 2024-08-08 DIAGNOSIS — I25.119 CORONARY ARTERY DISEASE INVOLVING NATIVE CORONARY ARTERY OF NATIVE HEART WITH ANGINA PECTORIS (CMS-HCC): ICD-10-CM

## 2024-08-08 DIAGNOSIS — I10 HYPERTENSION, UNSPECIFIED TYPE: ICD-10-CM

## 2024-08-08 DIAGNOSIS — K59.00 CONSTIPATION, UNSPECIFIED CONSTIPATION TYPE: ICD-10-CM

## 2024-08-08 DIAGNOSIS — S32.502D: Primary | ICD-10-CM

## 2024-08-08 PROCEDURE — 1036F TOBACCO NON-USER: CPT | Performed by: FAMILY MEDICINE

## 2024-08-08 PROCEDURE — 1159F MED LIST DOCD IN RCRD: CPT | Performed by: FAMILY MEDICINE

## 2024-08-08 PROCEDURE — 1160F RVW MEDS BY RX/DR IN RCRD: CPT | Performed by: FAMILY MEDICINE

## 2024-08-08 PROCEDURE — 99214 OFFICE O/P EST MOD 30 MIN: CPT | Performed by: FAMILY MEDICINE

## 2024-08-08 PROCEDURE — 3077F SYST BP >= 140 MM HG: CPT | Performed by: FAMILY MEDICINE

## 2024-08-08 PROCEDURE — 3078F DIAST BP <80 MM HG: CPT | Performed by: FAMILY MEDICINE

## 2024-08-08 RX ORDER — AMOXICILLIN 250 MG
2 CAPSULE ORAL DAILY PRN
Qty: 60 TABLET | Refills: 1 | Status: SHIPPED | OUTPATIENT
Start: 2024-08-08 | End: 2024-10-07

## 2024-08-08 RX ORDER — ROSUVASTATIN CALCIUM 20 MG/1
20 TABLET, COATED ORAL DAILY
Start: 2024-08-08 | End: 2025-08-08

## 2024-08-08 RX ORDER — CLOPIDOGREL BISULFATE 75 MG/1
75 TABLET ORAL DAILY
Qty: 30 TABLET | Refills: 11 | Status: SHIPPED | OUTPATIENT
Start: 2024-08-08 | End: 2025-08-08

## 2024-08-08 RX ORDER — ISOSORBIDE MONONITRATE 60 MG/1
60 TABLET, EXTENDED RELEASE ORAL DAILY
Qty: 30 TABLET | Refills: 11 | Status: SHIPPED | OUTPATIENT
Start: 2024-08-08 | End: 2025-08-08

## 2024-08-08 NOTE — TELEPHONE ENCOUNTER
08/08/24  Ilana from  [facility] called requesting an order for OT starting the week of 8/19/24

## 2024-08-08 NOTE — PROGRESS NOTES
Subjective   Patient ID: Tierney Madrid is a 84 y.o. female who presents for Hypertension, Spinal Stenosis, and Follow-up ([facility], left pubis fx, left femur fx, periprosthetic fx).     next appt 10/7/2024  Here with      Home since August 3 from [place] [facility]   July 15 fell  had walker and let go for second and fell and landed on  left side   No surgery   In wheel chair and transferring with one person assist and using walker   Has Home PT   In pt rehab was great and was making progress   Pain meds called in yesterday and had some nausea       June 24, 2024 had tia and speech aphasia and was in rehab in [facility] no longer taking creator     HTN     States home bp are much  better      In hospital was having low bp      Cardiology told to hold coreg if sbp less than 100 will check bid     Constipation needs pericolace        Cad no cp on plavix   Review of Systems    Objective   /70   Pulse 89   SpO2 100%     Physical Exam  Vitals reviewed.   Constitutional:       Appearance: Normal appearance.   HENT:      Head: Normocephalic and atraumatic.   Eyes:      Conjunctiva/sclera: Conjunctivae normal.   Cardiovascular:      Rate and Rhythm: Normal rate and regular rhythm.   Pulmonary:      Effort: Pulmonary effort is normal.      Breath sounds: Normal breath sounds.   Musculoskeletal:      Cervical back: Neck supple.      Comments: In wheelchair    Skin:     General: Skin is warm and dry.   Neurological:      General: No focal deficit present.      Mental Status: She is alert and oriented to person, place, and time.      Comments: Speech and memory are normal      Psychiatric:         Mood and Affect: Mood normal.         Behavior: Behavior normal.         Thought Content: Thought content normal.         Judgment: Judgment normal.         Assessment/Plan   Diagnoses and all orders for this visit:  Open nondisplaced fracture of left pubis with routine healing, subsequent  encounter  Constipation, unspecified constipation type  -     sennosides-docusate sodium (Katya-Colace) 8.6-50 mg tablet; Take 2 tablets by mouth once daily as needed for constipation.  Hypertension, unspecified type  -     clopidogrel (Plavix) 75 mg tablet; Take 1 tablet (75 mg) by mouth once daily.  -     isosorbide mononitrate ER (Imdur) 60 mg 24 hr tablet; Take 1 tablet (60 mg) by mouth once daily.  Closed nondisplaced segmental fracture of shaft of left femur with routine healing, subsequent encounter  TIA (transient ischemic attack)    Pt will let me know which  statin  she is on

## 2024-08-09 ENCOUNTER — TELEPHONE (OUTPATIENT)
Dept: PRIMARY CARE | Facility: CLINIC | Age: 84
End: 2024-08-09
Payer: MEDICARE

## 2024-08-09 DIAGNOSIS — I10 PRIMARY HYPERTENSION: ICD-10-CM

## 2024-08-09 NOTE — TELEPHONE ENCOUNTER
[phone] MAYCO FROM [facility], RN 2X1, 1X3 AND CONSULT FOR . AND SHE HAD A COUPLE MEDICATION AT HER HOME THAT WAS NOT ON THE LIST. WOULD LIKE A CALL BACK. THANK YOU.

## 2024-08-12 ENCOUNTER — TELEPHONE (OUTPATIENT)
Dept: PRIMARY CARE | Facility: CLINIC | Age: 84
End: 2024-08-12
Payer: MEDICARE

## 2024-08-12 RX ORDER — AMLODIPINE BESYLATE 5 MG/1
5 TABLET ORAL DAILY
Start: 2024-08-12 | End: 2024-08-13 | Stop reason: SDUPTHER

## 2024-08-12 NOTE — TELEPHONE ENCOUNTER
Spoke with Ainsley.  She spoke with patient today and she said that her blood pressure on Saturday was 98/47 am and 99/46 pm.  On Sunday was 127/54 am and 110/53 pm.  When patient was admitted to [facility] on Friday he blood pressure was 130/64.  Patient is asymptomatic but is concerned she is on to much medication for her BP    Please advise.

## 2024-08-12 NOTE — TELEPHONE ENCOUNTER
Ainsley, Nurse with [facility], she admitted this patient last week & was calling to get an order for nursing visits, they plan on seeing her for 2x per week for 1 week then 1x per week for 3 weeks. Also wants to get a consult for the  & needs a clarification for the med Atorvastatin, however she has been taking Crestor in the home at [facility]. Please call back to discuss

## 2024-08-13 ENCOUNTER — TELEPHONE (OUTPATIENT)
Dept: PRIMARY CARE | Facility: CLINIC | Age: 84
End: 2024-08-13
Payer: MEDICARE

## 2024-08-13 DIAGNOSIS — I10 PRIMARY HYPERTENSION: ICD-10-CM

## 2024-08-13 RX ORDER — AMLODIPINE BESYLATE 2.5 MG/1
2.5 TABLET ORAL DAILY
Qty: 30 TABLET | Refills: 0 | Status: SHIPPED | OUTPATIENT
Start: 2024-08-13 | End: 2024-09-12

## 2024-08-13 NOTE — TELEPHONE ENCOUNTER
Spoke with patient to clarify she is to  the Rx for 2.5 mg amlodipine, stop the 10 mg.  She voiced understanding.

## 2024-08-13 NOTE — TELEPHONE ENCOUNTER
Ainsley Nurse with [facility], states she is already on 5 mg Amlodipine, do they decrease more? Please call to clarify instruction

## 2024-08-13 NOTE — TELEPHONE ENCOUNTER
OSMIN FROM [facility]. PT WILL BE GOING IN 2X4, [phone] HE WOULD LIKE CALL BACK SO HE KNOWS YOU GOT THIS MESSAGE. THANK YOU.

## 2024-08-14 ENCOUNTER — TELEPHONE (OUTPATIENT)
Dept: PRIMARY CARE | Facility: CLINIC | Age: 84
End: 2024-08-14
Payer: MEDICARE

## 2024-08-16 ENCOUNTER — TELEPHONE (OUTPATIENT)
Dept: PRIMARY CARE | Facility: CLINIC | Age: 84
End: 2024-08-16
Payer: MEDICARE

## 2024-08-16 NOTE — TELEPHONE ENCOUNTER
SHE HAS A SMALL RED AREA ON HER BOTTOM, NOT OPEN JUST A LITTLE PURPLEISH. [phone] MAYCO [facility]. PLEASE CALL HER BACK SO SHE CAN ORDER HER SOME SUPPLY. THANK YOU.

## 2024-08-23 DIAGNOSIS — F32.4 MAJOR DEPRESSIVE DISORDER WITH SINGLE EPISODE, IN PARTIAL REMISSION (CMS-HCC): ICD-10-CM

## 2024-08-23 DIAGNOSIS — R60.0 BILATERAL EDEMA OF LOWER EXTREMITY: ICD-10-CM

## 2024-08-26 DIAGNOSIS — I10 PRIMARY HYPERTENSION: ICD-10-CM

## 2024-08-26 RX ORDER — FUROSEMIDE 20 MG/1
20 TABLET ORAL DAILY
Qty: 30 TABLET | Refills: 0 | Status: SHIPPED | OUTPATIENT
Start: 2024-08-26

## 2024-08-26 RX ORDER — CARVEDILOL 12.5 MG/1
12.5 TABLET ORAL
Qty: 30 TABLET | Refills: 0 | Status: SHIPPED | OUTPATIENT
Start: 2024-08-26

## 2024-08-26 RX ORDER — SERTRALINE HYDROCHLORIDE 25 MG/1
25 TABLET, FILM COATED ORAL DAILY
Qty: 30 TABLET | Refills: 0 | Status: SHIPPED | OUTPATIENT
Start: 2024-08-26 | End: 2024-12-24

## 2024-08-26 NOTE — TELEPHONE ENCOUNTER
Spoke with patient, she is asking for refills on her sertraline and furosemide sent to [facility].  Informed her that she has refills on the carvadilol, she voiced understanding.    Orders pended for 30 days

## 2024-08-26 NOTE — TELEPHONE ENCOUNTER
Patient called in and stated that [facility] told her that she had no refills left on the following medication....carvedilol (Coreg) 12.5 mg tablet ....

## 2024-08-26 NOTE — TELEPHONE ENCOUNTER
Please send refill to   [facility]  furosemide (Lasix) tablet 20 mg     carvedilol (Coreg) 12.5 mg tablet

## 2024-08-29 ENCOUNTER — TELEPHONE (OUTPATIENT)
Dept: PRIMARY CARE | Facility: CLINIC | Age: 84
End: 2024-08-29
Payer: MEDICARE

## 2024-08-29 DIAGNOSIS — I10 PRIMARY HYPERTENSION: ICD-10-CM

## 2024-08-29 NOTE — TELEPHONE ENCOUNTER
Patient called in and stated that you increased her amLODIPine last time you had talked and so she doubled her medication. She is about out of this medication now and only has 3 days worth left. Is there anyway she can get a new script with the new dosage.

## 2024-08-30 DIAGNOSIS — I10 PRIMARY HYPERTENSION: ICD-10-CM

## 2024-08-30 RX ORDER — AMLODIPINE BESYLATE 10 MG/1
10 TABLET ORAL DAILY
Qty: 30 TABLET | Refills: 0 | Status: SHIPPED | OUTPATIENT
Start: 2024-08-30 | End: 2024-09-29

## 2024-08-30 RX ORDER — AMLODIPINE BESYLATE 10 MG/1
10 TABLET ORAL DAILY
Qty: 30 TABLET | Refills: 0 | OUTPATIENT
Start: 2024-08-30

## 2024-08-30 NOTE — TELEPHONE ENCOUNTER
Patient is requesting a refill of amlodipine 10 mg    [facility] pharmacy     She is almost out of medication.

## 2024-09-03 DIAGNOSIS — I10 PRIMARY HYPERTENSION: ICD-10-CM

## 2024-09-03 RX ORDER — CARVEDILOL 12.5 MG/1
12.5 TABLET ORAL
Qty: 60 TABLET | Refills: 2 | Status: SHIPPED | OUTPATIENT
Start: 2024-09-03

## 2024-09-03 NOTE — TELEPHONE ENCOUNTER
carvedilol (Coreg) 12.5 mg tablet [[id number]] last refill was for 30 pills, patient takes 2 pills a day so she would have needed 60 pills. Please call and advise patient

## 2024-09-09 ENCOUNTER — APPOINTMENT (OUTPATIENT)
Dept: OCCUPATIONAL THERAPY | Facility: CLINIC | Age: 84
End: 2024-09-09
Payer: MEDICARE

## 2024-09-09 ENCOUNTER — APPOINTMENT (OUTPATIENT)
Dept: RADIOLOGY | Facility: HOSPITAL | Age: 84
End: 2024-09-09
Payer: MEDICARE

## 2024-09-09 ENCOUNTER — HOSPITAL ENCOUNTER (EMERGENCY)
Facility: HOSPITAL | Age: 84
Discharge: HOME | End: 2024-09-09
Attending: EMERGENCY MEDICINE
Payer: MEDICARE

## 2024-09-09 ENCOUNTER — TELEPHONE (OUTPATIENT)
Dept: PRIMARY CARE | Facility: CLINIC | Age: 84
End: 2024-09-09
Payer: MEDICARE

## 2024-09-09 VITALS
SYSTOLIC BLOOD PRESSURE: 170 MMHG | BODY MASS INDEX: 25.32 KG/M2 | OXYGEN SATURATION: 99 % | DIASTOLIC BLOOD PRESSURE: 79 MMHG | TEMPERATURE: 97.8 F | WEIGHT: 129 LBS | HEIGHT: 60 IN | HEART RATE: 76 BPM | RESPIRATION RATE: 18 BRPM
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
## 2024-09-09 DIAGNOSIS — S09.90XA HEAD INJURY, INITIAL ENCOUNTER: ICD-10-CM

## 2024-09-09 DIAGNOSIS — S20.211A CONTUSION OF RIB ON RIGHT SIDE, INITIAL ENCOUNTER: ICD-10-CM

## 2024-09-09 PROCEDURE — 70450 CT HEAD/BRAIN W/O DYE: CPT | Performed by: RADIOLOGY

## 2024-09-09 PROCEDURE — 70450 CT HEAD/BRAIN W/O DYE: CPT

## 2024-09-09 PROCEDURE — 71100 X-RAY EXAM RIBS UNI 2 VIEWS: CPT | Mod: RT

## 2024-09-09 PROCEDURE — 71100 X-RAY EXAM RIBS UNI 2 VIEWS: CPT | Mod: RIGHT SIDE | Performed by: RADIOLOGY

## 2024-09-09 PROCEDURE — 2500000001 HC RX 250 WO HCPCS SELF ADMINISTERED DRUGS (ALT 637 FOR MEDICARE OP): Performed by: EMERGENCY MEDICINE

## 2024-09-09 PROCEDURE — 99284 EMERGENCY DEPT VISIT MOD MDM: CPT

## 2024-09-09 RX ORDER — BISMUTH SUBSALICYLATE 262 MG
1 TABLET,CHEWABLE ORAL DAILY
COMMUNITY

## 2024-09-09 RX ORDER — OXYCODONE HYDROCHLORIDE 5 MG/1
5 TABLET ORAL ONCE
Status: COMPLETED | OUTPATIENT
Start: 2024-09-09 | End: 2024-09-09

## 2024-09-09 ASSESSMENT — PAIN SCALES - GENERAL
PAINLEVEL_OUTOF10: 8
PAINLEVEL_OUTOF10: 2

## 2024-09-09 ASSESSMENT — ENCOUNTER SYMPTOMS
CHILLS: 0
ABDOMINAL PAIN: 0
NECK STIFFNESS: 0
NECK PAIN: 0
PSYCHIATRIC NEGATIVE: 1
PHOTOPHOBIA: 0
PALPITATIONS: 0
HEMATOLOGIC/LYMPHATIC NEGATIVE: 1
ARTHRALGIAS: 1
FLANK PAIN: 0
FEVER: 0
NAUSEA: 0
VOMITING: 0
MYALGIAS: 1
HEADACHES: 1
COUGH: 0

## 2024-09-09 ASSESSMENT — PAIN DESCRIPTION - ORIENTATION: ORIENTATION: RIGHT;MID

## 2024-09-09 ASSESSMENT — PAIN - FUNCTIONAL ASSESSMENT
PAIN_FUNCTIONAL_ASSESSMENT: 0-10
PAIN_FUNCTIONAL_ASSESSMENT: 0-10

## 2024-09-09 ASSESSMENT — PAIN DESCRIPTION - DESCRIPTORS: DESCRIPTORS: SHARP

## 2024-09-09 ASSESSMENT — PAIN DESCRIPTION - PROGRESSION: CLINICAL_PROGRESSION: GRADUALLY IMPROVING

## 2024-09-09 ASSESSMENT — PAIN DESCRIPTION - PAIN TYPE: TYPE: ACUTE PAIN

## 2024-09-09 ASSESSMENT — PAIN DESCRIPTION - LOCATION: LOCATION: BACK

## 2024-09-09 NOTE — DISCHARGE INSTRUCTIONS
CONTINUE CURRENT PAIN MEDS AND ZOFRAN  ALTERNATE ICE/HEAT TO RIBS  COUGH AND DEEP BREATHING EXCERCISES

## 2024-09-09 NOTE — ED PROVIDER NOTES
Chief Complaint: FALL    This is an 84-year-old female who walks at home with a walker.  Lost her balance and fell she hit her head on the edge of a piece of furniture and landed on her right ribs.  Apparently fell approximately 2 days ago and complains of continued pain she has been taking Percocet that she has at home with some mild relief of the pain.  She has had no loss of consciousness or posttraumatic amnesia otherwise she denies any neck stiffness or pain no abdominal discomfort no extremity injury.           Review of Systems   Constitutional:  Negative for chills and fever.   HENT: Negative.          Positive head injury posterior occiput   Eyes:  Negative for photophobia.   Respiratory:  Negative for cough.    Cardiovascular:  Positive for chest pain. Negative for palpitations.        Right rib pain   Gastrointestinal:  Negative for abdominal pain, nausea and vomiting.   Genitourinary:  Negative for flank pain.   Musculoskeletal:  Positive for arthralgias and myalgias. Negative for neck pain and neck stiffness.   Skin:  Negative for rash.   Neurological:  Positive for headaches.   Hematological: Negative.    Psychiatric/Behavioral: Negative.     All other systems reviewed and are negative.       Physical Exam  Constitutional:       General: She is not in acute distress.     Appearance: Normal appearance. She is not ill-appearing.   HENT:      Head: Normocephalic and atraumatic.        Right Ear: Tympanic membrane normal.      Left Ear: Tympanic membrane normal.      Nose: Nose normal.      Mouth/Throat:      Mouth: Mucous membranes are moist.   Eyes:      Extraocular Movements: Extraocular movements intact.      Conjunctiva/sclera: Conjunctivae normal.      Pupils: Pupils are equal, round, and reactive to light.   Cardiovascular:      Rate and Rhythm: Normal rate.      Pulses: Normal pulses.      Heart sounds: No murmur heard.  Pulmonary:      Effort: Pulmonary effort is normal. No respiratory distress.       Breath sounds: Normal breath sounds. No decreased air movement.       Abdominal:      General: There is no distension.      Palpations: Abdomen is soft.      Tenderness: There is no abdominal tenderness.   Musculoskeletal:         General: No deformity or signs of injury.      Cervical back: Normal range of motion and neck supple. No rigidity or tenderness.      Right lower leg: No edema.      Left lower leg: No edema.   Skin:     General: Skin is warm and dry.      Capillary Refill: Capillary refill takes less than 2 seconds.      Findings: No bruising or erythema.   Neurological:      General: No focal deficit present.      Mental Status: She is alert and oriented to person, place, and time.   Psychiatric:         Mood and Affect: Mood normal.         Behavior: Behavior normal.          Labs Reviewed - No data to display     CT head wo IV contrast   Final Result   No evidence of acute cortical infarct or intracranial hemorrhage.        MACRO:   None             Signed by: Gabe Harris 9/9/2024 3:25 PM   Dictation workstation:   [id number]      XR ribs right 2 views   Final Result   1. No acute pulmonary pathology.   2. No rib fractures.   Signed by Zheng Rodriguez MD           Procedures     Medical Decision Making  Frontal diagnosis includes skull fracture subdural hematoma cerebellar contusion right rib contusion right rib fracture.  CT scan of the head and right ribs were ordered x-ray studies of the ribs showed no fracture CT scan of the brain was negative patient received Percocet felt markedly improved should be discharged on continuation of her pain meds and Zofran to follow-up with her primary care physician and was advised with cough and deep breathing exercises         Diagnoses as of 09/09/24 1705   Fall, initial encounter   Head injury, initial encounter   Contusion of rib on right side, initial encounter                    Garrett Espinoza MD  09/09/24 1705

## 2024-09-09 NOTE — TELEPHONE ENCOUNTER
Patient feel a few days ago. Her back is hurting and she did hit her head slightly. Patient wondering if she could get an order for an xray of her back and head as she is in a lot of pain in her back. There are no openings with any providers till next week.    Patient is on blood thinners.

## 2024-09-10 DIAGNOSIS — M48.061 SPINAL STENOSIS OF LUMBAR REGION, UNSPECIFIED WHETHER NEUROGENIC CLAUDICATION PRESENT: ICD-10-CM

## 2024-09-10 RX ORDER — OXYCODONE HYDROCHLORIDE 10 MG/1
10 TABLET ORAL 3 TIMES DAILY
Qty: 90 TABLET | Refills: 0 | Status: SHIPPED | OUTPATIENT
Start: 2024-09-10 | End: 2024-10-10

## 2024-09-10 NOTE — TELEPHONE ENCOUNTER
Medication Refill Request    Medication:  oxycodone    Pharmacy:  [facility]    Last OV:  8/8/24  Upcoming appointment:  10/7/24    ORDER PENDED

## 2024-09-10 NOTE — TELEPHONE ENCOUNTER
oxyCODONE (Roxicodone) immediate release tablet 5 mg   [[id number]]  Order Details  Ordered Dose: 5 mg Route: oral Frequency: Once   Admin Dose: 5 mg      Scheduled Start Date/Time: 09/09/24 1425 End Date/Time: 09/09/24 1511 after 1 doses    [facility]. THANK YOU.

## 2024-09-23 ENCOUNTER — HOSPITAL ENCOUNTER (OUTPATIENT)
Dept: RADIOLOGY | Facility: HOSPITAL | Age: 84
Discharge: HOME | End: 2024-09-23
Payer: MEDICARE

## 2024-09-23 ENCOUNTER — OFFICE VISIT (OUTPATIENT)
Dept: PRIMARY CARE | Facility: CLINIC | Age: 84
End: 2024-09-23
Payer: MEDICARE

## 2024-09-23 VITALS
SYSTOLIC BLOOD PRESSURE: 130 MMHG | BODY MASS INDEX: 25.35 KG/M2 | OXYGEN SATURATION: 95 % | WEIGHT: 129.8 LBS | HEART RATE: 88 BPM | DIASTOLIC BLOOD PRESSURE: 62 MMHG

## 2024-09-23 DIAGNOSIS — I25.118 CORONARY ARTERY DISEASE OF NATIVE ARTERY OF NATIVE HEART WITH STABLE ANGINA PECTORIS: ICD-10-CM

## 2024-09-23 DIAGNOSIS — I10 PRIMARY HYPERTENSION: ICD-10-CM

## 2024-09-23 DIAGNOSIS — R05.1 ACUTE COUGH: Primary | ICD-10-CM

## 2024-09-23 DIAGNOSIS — R05.1 ACUTE COUGH: ICD-10-CM

## 2024-09-23 DIAGNOSIS — R60.0 BILATERAL EDEMA OF LOWER EXTREMITY: ICD-10-CM

## 2024-09-23 PROCEDURE — 71046 X-RAY EXAM CHEST 2 VIEWS: CPT

## 2024-09-23 PROCEDURE — 3078F DIAST BP <80 MM HG: CPT | Performed by: FAMILY MEDICINE

## 2024-09-23 PROCEDURE — 99214 OFFICE O/P EST MOD 30 MIN: CPT | Performed by: FAMILY MEDICINE

## 2024-09-23 PROCEDURE — 71046 X-RAY EXAM CHEST 2 VIEWS: CPT | Performed by: RADIOLOGY

## 2024-09-23 PROCEDURE — 87635 SARS-COV-2 COVID-19 AMP PRB: CPT

## 2024-09-23 PROCEDURE — 3075F SYST BP GE 130 - 139MM HG: CPT | Performed by: FAMILY MEDICINE

## 2024-09-23 PROCEDURE — 1036F TOBACCO NON-USER: CPT | Performed by: FAMILY MEDICINE

## 2024-09-23 RX ORDER — AMLODIPINE BESYLATE 10 MG/1
10 TABLET ORAL DAILY
Qty: 30 TABLET | Refills: 11 | Status: SHIPPED | OUTPATIENT
Start: 2024-09-23

## 2024-09-23 RX ORDER — AZITHROMYCIN 250 MG/1
TABLET, FILM COATED ORAL
Qty: 6 TABLET | Refills: 0 | Status: SHIPPED | OUTPATIENT
Start: 2024-09-23 | End: 2024-09-28

## 2024-09-23 RX ORDER — ATORVASTATIN CALCIUM 40 MG/1
40 TABLET, FILM COATED ORAL DAILY
Qty: 100 TABLET | Refills: 3 | Status: SHIPPED | OUTPATIENT
Start: 2024-09-23 | End: 2025-09-23

## 2024-09-23 RX ORDER — FUROSEMIDE 20 MG/1
20 TABLET ORAL DAILY
Qty: 30 TABLET | Refills: 11 | Status: SHIPPED | OUTPATIENT
Start: 2024-09-23

## 2024-09-23 ASSESSMENT — ENCOUNTER SYMPTOMS: COUGH: 1

## 2024-09-23 NOTE — PROGRESS NOTES
0Subjective   Patient ID: Tierney Madrid is a 84 y.o. female who presents for Cough (Productive; coughing up green mucus for the last week. Ribs hurt due to fall and when coughing. She said she has had a fever today of 100.5, shortness of breath, body aches. She has not test for Covid 19).    Cough       X 1 week   Fever for week low 100s  CP right lower post tchest   No ear pressure no HA   No ST   No sinus drainage       Fell and injured the right lower chest   Xray 2 weeks ago was negs of ribs     Review of Systems   Respiratory:  Positive for cough.        Objective   /62   Pulse 88   Wt 58.9 kg (129 lb 12.8 oz)   SpO2 95%   BMI 25.35 kg/m²     Physical Exam  Vitals reviewed.   Constitutional:       Appearance: Normal appearance.   HENT:      Head: Normocephalic and atraumatic.      Nose: Nose normal. No congestion or rhinorrhea.      Mouth/Throat:      Mouth: Mucous membranes are moist.      Pharynx: Oropharynx is clear. No oropharyngeal exudate.   Eyes:      Conjunctiva/sclera: Conjunctivae normal.   Cardiovascular:      Rate and Rhythm: Normal rate and regular rhythm.   Pulmonary:      Effort: Pulmonary effort is normal.      Breath sounds: Normal breath sounds.      Comments: Crackles in RLL    Musculoskeletal:      Cervical back: Neck supple.   Skin:     General: Skin is warm and dry.   Neurological:      General: No focal deficit present.      Mental Status: She is alert and oriented to person, place, and time.   Psychiatric:         Mood and Affect: Mood normal.         Behavior: Behavior normal.         Thought Content: Thought content normal.         Judgment: Judgment normal.         Assessment/Plan   Diagnoses and all orders for this visit:  Acute cough  -     Sars-CoV-2 PCR; Future  -     XR chest 2 views; Future  -     azithromycin (Zithromax) 250 mg tablet; Take 2 tablets (500 mg) by mouth once daily for 1 day, THEN 1 tablet (250 mg) once daily for 4 days. Take 2 tabs (500 mg) by mouth  today, than 1 daily for 4 days..  Primary hypertension  -     amLODIPine (Norvasc) 10 mg tablet; Take 1 tablet (10 mg) by mouth once daily.  Bilateral edema of lower extremity  -     furosemide (Lasix) 20 mg tablet; Take 1 tablet (20 mg) by mouth once daily.  Coronary artery disease of native artery of native heart with stable angina pectoris (CMS-[facility])  -     atorvastatin (Lipitor) 40 mg tablet; Take 1 tablet (40 mg) by mouth once daily.

## 2024-09-24 ENCOUNTER — TELEPHONE (OUTPATIENT)
Dept: PRIMARY CARE | Facility: CLINIC | Age: 84
End: 2024-09-24
Payer: MEDICARE

## 2024-09-24 LAB — SARS-COV-2 RNA RESP QL NAA+PROBE: NOT DETECTED

## 2024-09-24 NOTE — TELEPHONE ENCOUNTER
Please call pt, she has some questions about what was talked about yesterday during her appointment and with Dr. Romi MARTINEZ.

## 2024-10-04 ENCOUNTER — APPOINTMENT (OUTPATIENT)
Dept: PRIMARY CARE | Facility: CLINIC | Age: 84
End: 2024-10-04
Payer: MEDICARE

## 2024-10-07 ENCOUNTER — APPOINTMENT (OUTPATIENT)
Dept: PRIMARY CARE | Facility: CLINIC | Age: 84
End: 2024-10-07
Payer: MEDICARE

## 2024-10-08 ENCOUNTER — APPOINTMENT (OUTPATIENT)
Dept: CARDIOLOGY | Facility: CLINIC | Age: 84
End: 2024-10-08
Payer: MEDICARE

## 2024-10-08 VITALS
SYSTOLIC BLOOD PRESSURE: 110 MMHG | OXYGEN SATURATION: 96 % | BODY MASS INDEX: 26.83 KG/M2 | DIASTOLIC BLOOD PRESSURE: 60 MMHG | HEART RATE: 78 BPM | WEIGHT: 133.1 LBS | HEIGHT: 59 IN

## 2024-10-08 DIAGNOSIS — R60.0 BILATERAL EDEMA OF LOWER EXTREMITY: ICD-10-CM

## 2024-10-08 PROCEDURE — 3078F DIAST BP <80 MM HG: CPT | Performed by: STUDENT IN AN ORGANIZED HEALTH CARE EDUCATION/TRAINING PROGRAM

## 2024-10-08 PROCEDURE — 99215 OFFICE O/P EST HI 40 MIN: CPT | Performed by: STUDENT IN AN ORGANIZED HEALTH CARE EDUCATION/TRAINING PROGRAM

## 2024-10-08 PROCEDURE — 1160F RVW MEDS BY RX/DR IN RCRD: CPT | Performed by: STUDENT IN AN ORGANIZED HEALTH CARE EDUCATION/TRAINING PROGRAM

## 2024-10-08 PROCEDURE — 1159F MED LIST DOCD IN RCRD: CPT | Performed by: STUDENT IN AN ORGANIZED HEALTH CARE EDUCATION/TRAINING PROGRAM

## 2024-10-08 PROCEDURE — 1036F TOBACCO NON-USER: CPT | Performed by: STUDENT IN AN ORGANIZED HEALTH CARE EDUCATION/TRAINING PROGRAM

## 2024-10-08 PROCEDURE — 3074F SYST BP LT 130 MM HG: CPT | Performed by: STUDENT IN AN ORGANIZED HEALTH CARE EDUCATION/TRAINING PROGRAM

## 2024-10-08 RX ORDER — FUROSEMIDE 20 MG/1
20 TABLET ORAL DAILY
Qty: 30 TABLET | Refills: 0 | Status: SHIPPED | OUTPATIENT
Start: 2024-10-08 | End: 2024-11-07

## 2024-10-08 NOTE — PROGRESS NOTES
Chief Complaint   Patient presents with    Follow-up     3 month     HPI:  I was requested by Dr. Leggett to evaluate this patient in consultation for cardiac assessment.    Mrs. Tierney Madrid is a 84 y.o. non-smoker female with prior medical history significant for TIA post S/P successful PCI to LAD (2 RONALD) and RCA (1 RONALD) for in-stent restenosis. Patient with prior medical history significant for hypertension, hyperlipidemia, coronary artery disease (stents to the RCA 12/2016, mid LAD 11/2017, RCA October 2018 in September 2019), supraventricular tachyarrhythmia. Patient was complaining of worsening dyspnea on exertion and substernal chest discomfort with radiation to should and neck. She underwent an uneventful procedure S/P PCI to LAD and RCA. Her BP was extremely high during the procedure, with systolic BP ~240mmHg, that went down to 160mmHg after sedation, amlodipine, nitroglycerin and IV hydralazine. She was loaded with Lisinopril 20mg daily after the procedure, and also with Plavix 300mg and ASA. Patient was somnolent after the procedure, we initially thought it was because of the residual sedation. However, after 3 hours after the procedure, she was more somnolent. Was given IV Naloxone and Flumazenil. Afterward that, she developed difficulty speaking and became very agitated. We called the stroke team. Her NIH was initially 9. CT brain did not show signs of bleeding. Stroke call suggested to keep medical therapy at that point. Patient had some improvement in her symptoms, able to speak and more conscious, and was admitted for clinical compensation.     Notably, during the hospitalization, the patient had TIA/CVA symptoms but all the tests, including the MRI, were completely normal. This was ultimately attributed to adverse reaction to sedation. Upon discharge, she was recovering well and today she comes back to the appointment feeling completely normal. She had femur fracture and rib fracture due to falls  after being discharged, but she is currently feeling fine.  She does not have any sequels from that episode.  She has been feeling fine and she is currently asymptomatic from the cardiovascular standpoint.  She is compliant with the medication. She denies chest pain, shortness of breath, palpitations, lightheadedness, headaches, fever, chills, orthopnea, paroxysmal nocturnal dyspnea or syncope.     Past Medical History  Past Medical History:   Diagnosis Date    Acute myocardial infarction, unspecified     Acute myocardial infarction    Tinea unguium 09/12/2022    Onychomycosis of toenail       Past Surgical History  Past Surgical History:   Procedure Laterality Date    CARDIAC CATHETERIZATION N/A 6/24/2024    Procedure: Left Heart Cath;  Surgeon: Ishaan Rocha MD;  Location: [facility] Cardiac Cath Lab;  Service: Cardiovascular;  Laterality: N/A;  8am    CARDIAC CATHETERIZATION N/A 6/24/2024    Procedure: PCI;  Surgeon: Ishaan Rocha MD;  Location: [facility] Cardiac Cath Lab;  Service: Cardiovascular;  Laterality: N/A;    CARDIAC CATHETERIZATION N/A 6/24/2024    Procedure: IVUS - Coronary;  Surgeon: Ishaan Rocha MD;  Location: [facility] Cardiac Cath Lab;  Service: Cardiovascular;  Laterality: N/A;    OTHER SURGICAL HISTORY  11/15/2019    Hip replacement    OTHER SURGICAL HISTORY  11/15/2019    Lumbar laminectomy    OTHER SURGICAL HISTORY  11/15/2019    Colonoscopy    OTHER SURGICAL HISTORY  12/01/2022    Cardiac catheterization with stent placement       Past Family History  Family History   Problem Relation Name Age of Onset    Heart disease Mother      Hypertension Mother      Lung cancer Mother      Clotting disorder Father      Heart disease Father      Stroke Father      Heart failure Father      Hypertension Father         Allergy History  Allergies   Allergen Reactions    Amoxicillin Unknown and Nausea/vomiting    Diazepam Unknown, Other and Seizure     Causes - Seizures    Gabapentin  "Other     Shaky    Hydralazine Headache     HEADACHES   CHEST PAIN    Morphine Confusion and Other     PT STATES \"IT MAKES MY HEAD FEEL WEIRD\"    Nitrofurantoin Hives    Promethazine Unknown    Zolpidem Unknown, Insomnia and Other     Insomnia    Doxycycline Nausea/vomiting       Past Social History  Social History     Socioeconomic History    Marital status:    Tobacco Use    Smoking status: Never    Smokeless tobacco: Never   Vaping Use    Vaping status: Never Used   Substance and Sexual Activity    Alcohol use: Not Currently    Drug use: Never    Sexual activity: Defer     Social Determinants of Health     Financial Resource Strain: Patient Unable To Answer (6/24/2024)    Overall Financial Resource Strain (CARDIA)     Difficulty of Paying Living Expenses: Patient unable to answer   Food Insecurity: No Food Insecurity (7/16/2024)    Received from [facility]    Hunger Vital Sign     Worried About Running Out of Food in the Last Year: Never true     Ran Out of Food in the Last Year: Never true   Transportation Needs: No Transportation Needs (7/16/2024)    Received from [facility]    PRAPARE - Transportation     Lack of Transportation (Medical): No     Lack of Transportation (Non-Medical): No   Intimate Partner Violence: Not At Risk (7/16/2024)    Received from [facility]    Humiliation, Afraid, Rape, and Kick questionnaire     Fear of Current or Ex-Partner: No     Emotionally Abused: No     Physically Abused: No     Sexually Abused: No   Housing Stability: Patient Unable To Answer (6/24/2024)    Housing Stability Vital Sign     Unable to Pay for Housing in the Last Year: Patient unable to answer     Number of Places Lived in the Last Year: 1     Unstable Housing in the Last Year: Patient unable to answer       Social History     Tobacco Use   Smoking Status Never   Smokeless Tobacco Never       Review of Systems:  A total of 12 systems have been reviewed and are negative except for the aforementioned findings " "described in HPI.     Objective Data:  Last Recorded Vitals:  Vitals:    10/08/24 1542   BP: 110/60   BP Location: Left arm   Patient Position: Sitting   Pulse: 78   SpO2: 96%   Weight: 60.4 kg (133 lb 1.6 oz)   Height: 1.499 m (4' 11\")       Last Labs:  CBC - 6/28/2024:  6:00 AM  6.0 9.2 187    29.4      CMP - 6/28/2024:  5:52 AM  8.2 5.7 19 --- 0.4   4.5 3.2 10 68      PTT - No results in last year.  1.0   11.9 _     TROPHS   Date/Time Value Ref Range Status   06/26/2024 07:20  0 - 13 ng/L Final   05/20/2024 04:24 PM 4 0 - 13 ng/L Final   02/13/2024 10:15 AM 4 0 - 13 ng/L Final     BNP   Date/Time Value Ref Range Status   06/24/2024 04:15 PM 97 0 - 99 pg/mL Final   05/20/2024 04:24 PM 37 0 - 99 pg/mL Final     HGBA1C   Date/Time Value Ref Range Status   06/24/2024 04:15 PM 5.9 see below % Final     LDLCALC   Date/Time Value Ref Range Status   06/24/2024 04:15  <=99 mg/dL Final     Comment:                                 Near   Borderline      AGE      Desirable  Optimal    High     High     Very High     0-19 Y     0 - 109     ---    110-129   >/= 130     ----    20-24 Y     0 - 119     ---    120-159   >/= 160     ----      >24 Y     0 -  99   100-129  130-159   160-189     >/=190       VLDL   Date/Time Value Ref Range Status   06/24/2024 04:15 PM 28 0 - 40 mg/dL Final        Patient Medications:  Outpatient Encounter Medications as of 10/8/2024   Medication Sig Dispense Refill    acetaminophen (Tylenol) 500 mg tablet Take 2 tablets (1,000 mg) by mouth every 8 hours if needed for mild pain (1 - 3).      amLODIPine (Norvasc) 10 mg tablet Take 1 tablet (10 mg) by mouth once daily. 30 tablet 11    aspirin 81 mg EC tablet Take 1 tablet (81 mg) by mouth once daily.      atorvastatin (Lipitor) 40 mg tablet Take 1 tablet (40 mg) by mouth once daily. 100 tablet 3    carvedilol (Coreg) 12.5 mg tablet Take 1 tablet (12.5 mg) by mouth 2 times daily (morning and late afternoon). other 60 tablet 2    cetirizine " (ZyrTEC) 5 mg tablet Take 1 tablet (5 mg) by mouth once daily.      clopidogrel (Plavix) 75 mg tablet Take 1 tablet (75 mg) by mouth once daily. 30 tablet 11    fluticasone (Flonase) 50 mcg/actuation nasal spray Administer 1 spray into each nostril 2 times a day. Instill 1 squirt 16 g 0    isosorbide mononitrate ER (Imdur) 60 mg 24 hr tablet Take 1 tablet (60 mg) by mouth once daily. 30 tablet 11    lisinopril 20 mg tablet Take 1 tablet (20 mg) by mouth once daily. 90 tablet 3    multivitamin tablet Take 1 tablet by mouth once daily.      naloxone (Narcan) 4 mg/0.1 mL nasal spray Administer 1 spray (4 mg) into affected nostril(s) if needed (unresponsiveness).      nitroglycerin (Nitrostat) 0.4 mg SL tablet Place 1 tablet (0.4 mg) under the tongue every 5 minutes if needed for chest pain. For up to 3 doses as needed for chest pain. Call 911 if pain persists 25 tablet 0    oxyCODONE (Roxicodone) 10 mg immediate release tablet Take 1 tablet (10 mg) by mouth 3 times a day. 90 tablet 0    pantoprazole (ProtoNix) 40 mg EC tablet Take 1 tablet (40 mg) by mouth once daily. 30 tablet 11    pramipexole (Mirapex) 0.5 mg tablet Take 2 tablets (1 mg) by mouth once daily. 60 tablet 11    sertraline (Zoloft) 25 mg tablet Take 1 tablet (25 mg) by mouth once daily. 30 tablet 0    [DISCONTINUED] furosemide (Lasix) 20 mg tablet Take 1 tablet (20 mg) by mouth once daily. 30 tablet 11    furosemide (Lasix) 20 mg tablet Take 1 tablet (20 mg) by mouth once daily. 30 tablet 0     No facility-administered encounter medications on file as of 10/8/2024.       Physical Exam:  General: alert, oriented and in no acute distress  HEENT: NC/AT; EOMI; PERRLA, external ear is normal  Neck: supple; trachea midline; no masses; no JVD  Chest: clear breath sounds bilaterally; no wheezing  Cardio: regular rhythm, S1S2 normal, no murmurs  Abdomen: Soft, non-tender, non-distension, no organomegaly  Extremities: no clubbing/cyanosis/edema  Neuro: Grossly  intact     Psychiatric: Normal mood and affect     Past Cardiology Results (Last 3 Years):  EKG:  ECG 12 lead (Clinic Performed) 07/08/2024      ECG 12 Lead 06/26/2024      ECG 12 Lead 06/26/2024      Electrocardiogram 12 Lead 06/24/2024      ECG 12 lead STAT 06/24/2024      ECG 12 lead 05/20/2024      ECG 12 lead 02/13/2024      ECG 12 lead 02/13/2024    Echo:  Echo Results:  No results found for this or any previous visit from the past 365 days.     Cath:  Cardiac Catheterization Procedure 06/24/2024    CV NCDR CATHPCI V5 COLLECTION FORM   Stress Test:  No results found for this or any previous visit from the past 1095 days.    Cardiac Imaging:  No results found for this or any previous visit from the past 1095 days.       Assessment/Plan     Mrs. Tierney Madrid is a 84 y.o. non-smoker female with prior medical history significant for TIA post S/P successful PCI to LAD (2 RONALD) and RCA (1 RONALD) for in-stent restenosis. Patient with prior medical history significant for hypertension, hyperlipidemia, coronary artery disease (stents to the RCA 12/2016, mid LAD 11/2017, RCA October 2018 in September 2019), supraventricular tachyarrhythmia. Patient was complaining of worsening dyspnea on exertion and substernal chest discomfort with radiation to should and neck. She underwent an uneventful procedure S/P PCI to LAD and RCA. Her BP was extremely high during the procedure, with systolic BP ~240mmHg, that went down to 160mmHg after sedation, amlodipine, nitroglycerin and IV hydralazine. She was loaded with Lisinopril 20mg daily after the procedure, and also with Plavix 300mg and ASA. Patient was somnolent after the procedure, we initially thought it was because of the residual sedation. However, after 3 hours after the procedure, she was more somnolent. Was given IV Naloxone and Flumazenil. Afterward that, she developed difficulty speaking and became very agitated. We called the stroke team. Her NIH was initially 9. CT brain  did not show signs of bleeding. Stroke call suggested to keep medical therapy at that point. Patient had some improvement in her symptoms, able to speak and more conscious, and was admitted for clinical compensation.      Assessment     # Atherosclerosis / CAD S/P PCI to LAD and RCA (06/24/2024) / Prior stents to the RCA 12/2016, mid LAD 11/2017, RCA October 2018 in September 2019  - Patient with known atherosclerotic disease, implying significant increased risk for major adverse cardiac events.  - Left Heart Catheterization (06/24/2024):  Multivessel coronary artery disease  Prox LAD 70% in-stent restenosis; distal LAD 90% in-stent restenosis  Mid LCx 50% lesion  Patent stent to 1st Dg  Ostial / prox RCA 80% in-stent restenosis  Preserved LV systolic function  S/P Successful PCI to distal and proximal LAD using 2 drug-eluting stents (RONALD), guided by intravascular ultrasound (IVUS)  S/P Successful PCI to ostial/prox RCA using one RONALD, guided by IVUS  - Notably, during the hospitalization, the patient had TIA/CVA symptoms but all the tests, including the MRI, were completely normal.  This was ultimately attributed to adverse reaction to sedation.  Upon discharge, she was recovering well and today she comes back to the appointment feeling completely normal. She had femur fracture and rib fracture due to falls after being discharged, but she is currently feeling fine.  She does not have any sequels from that episode.  She has been feeling fine and she is currently asymptomatic from the cardiovascular standpoint.  She is compliant with the medication.   - Would suggest to keep ASA 81mg daily, Clopidogrel 75mg daily, Atorvastatin 40mg daily, Carvedilol 12.5mg BID, Lisinopril 20mg daily.  - Follow up in 6 months.     # Hypertension  - Controlled BP.  - Would suggest to keep current medications including Amlodipine 10mg daily, Lisinopril 20mg daily, Carvedilol 12.5mg BID.  - Patient counseled to keep a healthy lifestyle  including regular exercise and low-sodium diet.  - Recommended home blood pressure monitoring.  - Goal of BP < 130/80mmHg.      # Hyperlipidemia  - Keep home medication with Atorvastatin 40mg daily.  - Counseled on healthy diet and regular exercise.       Thank you for allowing me to participate in the care of this patient. Please reach me out if you have any questions or if you need any clarifications regarding the patient's care.      We have discussed the most common side effects of the prescribed medications, indications, drug interactions, risks, complications, and alternatives of medications/therapeutics were explained and discussed. The patient has been requested to monitor closely for any untoward side effects or complications of medications. The patient has been strongly advised to be compliant with the recommendations, all the questions and concerns have been addressed. The patient has been also instructed to call, to return sooner or to go to the emergency department if symptoms persist or get worsen. The patient voiced understanding and denies any further questions at this time.     This note was transcribed using the Dragon Dictation system. There may be grammatical, punctuation, or verbiage errors that occur with voice recognition programs.    Counseling greater than 50% of visit regarding all cardiac issues.    Thank you, Dr. Leggett, for allowing me to participate in the care of this patient. Please do not hesitate to contact me with any further questions or concerns.    Ishaan Rocha MD  Cardiology

## 2024-10-10 DIAGNOSIS — M48.061 SPINAL STENOSIS OF LUMBAR REGION, UNSPECIFIED WHETHER NEUROGENIC CLAUDICATION PRESENT: ICD-10-CM

## 2024-10-10 RX ORDER — OXYCODONE HYDROCHLORIDE 10 MG/1
10 TABLET ORAL 3 TIMES DAILY
Qty: 90 TABLET | Refills: 0 | Status: CANCELLED | OUTPATIENT
Start: 2024-10-10 | End: 2024-11-09

## 2024-10-10 NOTE — TELEPHONE ENCOUNTER
Medication Refill Request    Medication:  oxycodone    Pharmacy:  [facility]    Last OV:  9/23/24  Upcoming appointment:  10/31/24    ORDER PENDED

## 2024-10-11 DIAGNOSIS — M48.061 SPINAL STENOSIS OF LUMBAR REGION, UNSPECIFIED WHETHER NEUROGENIC CLAUDICATION PRESENT: ICD-10-CM

## 2024-10-11 RX ORDER — OXYCODONE HYDROCHLORIDE 10 MG/1
10 TABLET ORAL 3 TIMES DAILY
Qty: 90 TABLET | Refills: 0 | Status: SHIPPED | OUTPATIENT
Start: 2024-10-11 | End: 2024-11-10

## 2024-10-11 RX ORDER — OXYCODONE HYDROCHLORIDE 10 MG/1
10 TABLET ORAL 3 TIMES DAILY
Qty: 90 TABLET | Refills: 0 | OUTPATIENT
Start: 2024-10-11 | End: 2024-11-10

## 2024-10-14 ENCOUNTER — TELEPHONE (OUTPATIENT)
Dept: PRIMARY CARE | Facility: CLINIC | Age: 84
End: 2024-10-14
Payer: MEDICARE

## 2024-10-14 DIAGNOSIS — F32.4 MAJOR DEPRESSIVE DISORDER WITH SINGLE EPISODE, IN PARTIAL REMISSION (CMS-HCC): ICD-10-CM

## 2024-10-14 RX ORDER — SERTRALINE HYDROCHLORIDE 25 MG/1
25 TABLET, FILM COATED ORAL DAILY
Qty: 30 TABLET | Refills: 0 | OUTPATIENT
Start: 2024-10-14

## 2024-10-15 ENCOUNTER — TELEPHONE (OUTPATIENT)
Dept: PRIMARY CARE | Facility: CLINIC | Age: 84
End: 2024-10-15
Payer: MEDICARE

## 2024-10-15 DIAGNOSIS — N30.00 ACUTE CYSTITIS WITHOUT HEMATURIA: Primary | ICD-10-CM

## 2024-10-15 RX ORDER — SERTRALINE HYDROCHLORIDE 25 MG/1
25 TABLET, FILM COATED ORAL DAILY
Qty: 30 TABLET | Refills: 0 | Status: SHIPPED | OUTPATIENT
Start: 2024-10-15 | End: 2025-02-12

## 2024-10-15 NOTE — TELEPHONE ENCOUNTER
Spoke with patient and she said she only has burning on the outside, denies any bladder pressure or cramping that started about 3 days ago.  She is asking should she wait a few more days to take a test?  Please advise.

## 2024-10-17 ENCOUNTER — LAB (OUTPATIENT)
Dept: LAB | Facility: LAB | Age: 84
End: 2024-10-17
Payer: MEDICARE

## 2024-10-17 DIAGNOSIS — R30.0 DYSURIA: ICD-10-CM

## 2024-10-17 DIAGNOSIS — N30.00 ACUTE CYSTITIS WITHOUT HEMATURIA: ICD-10-CM

## 2024-10-17 LAB
AMORPH CRY #/AREA UR COMP ASSIST: ABNORMAL /HPF
APPEARANCE UR: CLEAR
BACTERIA #/AREA URNS AUTO: ABNORMAL /HPF
BILIRUB UR STRIP.AUTO-MCNC: NEGATIVE MG/DL
COLOR UR: YELLOW
GLUCOSE UR STRIP.AUTO-MCNC: NORMAL MG/DL
KETONES UR STRIP.AUTO-MCNC: NEGATIVE MG/DL
LEUKOCYTE ESTERASE UR QL STRIP.AUTO: NEGATIVE
NITRITE UR QL STRIP.AUTO: NEGATIVE
PH UR STRIP.AUTO: 5 [PH]
PROT UR STRIP.AUTO-MCNC: ABNORMAL MG/DL
RBC # UR STRIP.AUTO: ABNORMAL /UL
RBC #/AREA URNS AUTO: ABNORMAL /HPF
SP GR UR STRIP.AUTO: 1.03
UROBILINOGEN UR STRIP.AUTO-MCNC: NORMAL MG/DL
WBC #/AREA URNS AUTO: ABNORMAL /HPF

## 2024-10-17 PROCEDURE — 87086 URINE CULTURE/COLONY COUNT: CPT

## 2024-10-17 PROCEDURE — 81001 URINALYSIS AUTO W/SCOPE: CPT

## 2024-10-18 LAB — HOLD SPECIMEN: NORMAL

## 2024-10-19 LAB — BACTERIA UR CULT: NO GROWTH

## 2024-10-31 ENCOUNTER — APPOINTMENT (OUTPATIENT)
Dept: PRIMARY CARE | Facility: CLINIC | Age: 84
End: 2024-10-31
Payer: MEDICARE

## 2024-10-31 VITALS
BODY MASS INDEX: 26.01 KG/M2 | SYSTOLIC BLOOD PRESSURE: 120 MMHG | HEART RATE: 64 BPM | WEIGHT: 128.8 LBS | DIASTOLIC BLOOD PRESSURE: 62 MMHG | OXYGEN SATURATION: 96 %

## 2024-10-31 DIAGNOSIS — Z51.81 MEDICATION MONITORING ENCOUNTER: Primary | ICD-10-CM

## 2024-10-31 DIAGNOSIS — R05.1 ACUTE COUGH: ICD-10-CM

## 2024-10-31 DIAGNOSIS — Z88.9 HISTORY OF SEASONAL ALLERGIES: ICD-10-CM

## 2024-10-31 DIAGNOSIS — H61.23 BILATERAL IMPACTED CERUMEN: ICD-10-CM

## 2024-10-31 DIAGNOSIS — F32.4 MAJOR DEPRESSIVE DISORDER WITH SINGLE EPISODE, IN PARTIAL REMISSION (CMS-HCC): ICD-10-CM

## 2024-10-31 PROCEDURE — 80361 OPIATES 1 OR MORE: CPT

## 2024-10-31 PROCEDURE — 1036F TOBACCO NON-USER: CPT | Performed by: FAMILY MEDICINE

## 2024-10-31 PROCEDURE — 80358 DRUG SCREENING METHADONE: CPT

## 2024-10-31 PROCEDURE — 80307 DRUG TEST PRSMV CHEM ANLYZR: CPT

## 2024-10-31 PROCEDURE — 80346 BENZODIAZEPINES1-12: CPT

## 2024-10-31 PROCEDURE — 3074F SYST BP LT 130 MM HG: CPT | Performed by: FAMILY MEDICINE

## 2024-10-31 PROCEDURE — 80373 DRUG SCREENING TRAMADOL: CPT

## 2024-10-31 PROCEDURE — 81003 URINALYSIS AUTO W/O SCOPE: CPT

## 2024-10-31 PROCEDURE — 82570 ASSAY OF URINE CREATININE: CPT

## 2024-10-31 PROCEDURE — 3078F DIAST BP <80 MM HG: CPT | Performed by: FAMILY MEDICINE

## 2024-10-31 PROCEDURE — 80368 SEDATIVE HYPNOTICS: CPT

## 2024-10-31 PROCEDURE — 80354 DRUG SCREENING FENTANYL: CPT

## 2024-10-31 PROCEDURE — 99214 OFFICE O/P EST MOD 30 MIN: CPT | Performed by: FAMILY MEDICINE

## 2024-10-31 PROCEDURE — 1159F MED LIST DOCD IN RCRD: CPT | Performed by: FAMILY MEDICINE

## 2024-10-31 PROCEDURE — 80365 DRUG SCREENING OXYCODONE: CPT

## 2024-10-31 RX ORDER — FLUTICASONE PROPIONATE 50 MCG
1 SPRAY, SUSPENSION (ML) NASAL 2 TIMES DAILY
Qty: 16 G | Refills: 11 | Status: SHIPPED | OUTPATIENT
Start: 2024-10-31 | End: 2024-11-30

## 2024-10-31 RX ORDER — SERTRALINE HYDROCHLORIDE 25 MG/1
25 TABLET, FILM COATED ORAL DAILY
Qty: 90 TABLET | Refills: 1 | Status: SHIPPED | OUTPATIENT
Start: 2024-10-31

## 2024-10-31 RX ORDER — AZITHROMYCIN 250 MG/1
TABLET, FILM COATED ORAL
Qty: 6 TABLET | Refills: 0 | Status: SHIPPED | OUTPATIENT
Start: 2024-10-31 | End: 2024-11-05

## 2024-10-31 ASSESSMENT — ENCOUNTER SYMPTOMS: DEPRESSION: 1

## 2024-11-01 LAB
AMPHETAMINES UR QL SCN: ABNORMAL
BARBITURATES UR QL SCN: ABNORMAL
BZE UR QL SCN: ABNORMAL
CANNABINOIDS UR QL SCN: ABNORMAL
CREAT UR-MCNC: 11.4 MG/DL (ref 20–320)
PCP UR QL SCN: ABNORMAL
SP GR UR STRIP.AUTO: 1

## 2024-11-06 LAB
1OH-MIDAZOLAM UR CFM-MCNC: <25 NG/ML
6MAM UR CFM-MCNC: <25 NG/ML
7AMINOCLONAZEPAM UR CFM-MCNC: <25 NG/ML
A-OH ALPRAZ UR CFM-MCNC: <25 NG/ML
ALPRAZ UR CFM-MCNC: <25 NG/ML
CHLORDIAZEP UR CFM-MCNC: <25 NG/ML
CLONAZEPAM UR CFM-MCNC: <25 NG/ML
CODEINE UR CFM-MCNC: <50 NG/ML
DIAZEPAM UR CFM-MCNC: <25 NG/ML
EDDP UR CFM-MCNC: <25 NG/ML
FENTANYL UR CFM-MCNC: <2.5 NG/ML
HYDROCODONE CTO UR CFM-MCNC: <25 NG/ML
HYDROMORPHONE UR CFM-MCNC: <25 NG/ML
LORAZEPAM UR CFM-MCNC: <25 NG/ML
METHADONE UR CFM-MCNC: <25 NG/ML
MIDAZOLAM UR CFM-MCNC: <25 NG/ML
MORPHINE UR CFM-MCNC: <50 NG/ML
NORDIAZEPAM UR CFM-MCNC: <25 NG/ML
NORFENTANYL UR CFM-MCNC: <2.5 NG/ML
NORHYDROCODONE UR CFM-MCNC: <25 NG/ML
NOROXYCODONE UR CFM-MCNC: 210 NG/ML
NORTRAMADOL UR-MCNC: <50 NG/ML
OXAZEPAM UR CFM-MCNC: <25 NG/ML
OXYCODONE UR CFM-MCNC: 114 NG/ML
OXYMORPHONE UR CFM-MCNC: 281 NG/ML
TEMAZEPAM UR CFM-MCNC: <25 NG/ML
TRAMADOL UR CFM-MCNC: <50 NG/ML
ZOLPIDEM UR CFM-MCNC: <25 NG/ML
ZOLPIDEM UR-MCNC: <25 NG/ML

## 2024-11-11 DIAGNOSIS — M48.061 SPINAL STENOSIS OF LUMBAR REGION, UNSPECIFIED WHETHER NEUROGENIC CLAUDICATION PRESENT: ICD-10-CM

## 2024-11-11 NOTE — TELEPHONE ENCOUNTER
Medication Refill Request    Medication:  oxycodone    Pharmacy:  [facility]    Last OV:  10/31/24  Upcoming appointment:  11/19/24    ORDER PENDED

## 2024-11-11 NOTE — TELEPHONE ENCOUNTER
Patient asking for a call back. She seen on MyChart that the request for oxycodone was denied. [phone]

## 2024-11-12 RX ORDER — OXYCODONE HYDROCHLORIDE 10 MG/1
10 TABLET ORAL 3 TIMES DAILY
Qty: 90 TABLET | Refills: 0 | Status: SHIPPED | OUTPATIENT
Start: 2024-11-12 | End: 2024-12-12

## 2024-11-19 ENCOUNTER — APPOINTMENT (OUTPATIENT)
Dept: PRIMARY CARE | Facility: CLINIC | Age: 84
End: 2024-11-19
Payer: MEDICARE

## 2024-11-19 VITALS
BODY MASS INDEX: 25.61 KG/M2 | DIASTOLIC BLOOD PRESSURE: 80 MMHG | OXYGEN SATURATION: 95 % | SYSTOLIC BLOOD PRESSURE: 148 MMHG | WEIGHT: 126.8 LBS | HEART RATE: 77 BPM

## 2024-11-19 DIAGNOSIS — H61.23 BILATERAL IMPACTED CERUMEN: ICD-10-CM

## 2024-11-19 DIAGNOSIS — H60.503 ACUTE NONINFECTIVE OTITIS EXTERNA OF BOTH EARS, UNSPECIFIED TYPE: Primary | ICD-10-CM

## 2024-11-19 DIAGNOSIS — N95.1 HOT FLASHES DUE TO MENOPAUSE: ICD-10-CM

## 2024-11-19 DIAGNOSIS — H60.533 ACUTE CONTACT OTITIS EXTERNA OF BOTH EARS: ICD-10-CM

## 2024-11-19 PROCEDURE — 1160F RVW MEDS BY RX/DR IN RCRD: CPT

## 2024-11-19 PROCEDURE — 99214 OFFICE O/P EST MOD 30 MIN: CPT

## 2024-11-19 PROCEDURE — 1036F TOBACCO NON-USER: CPT

## 2024-11-19 PROCEDURE — 3079F DIAST BP 80-89 MM HG: CPT

## 2024-11-19 PROCEDURE — 1159F MED LIST DOCD IN RCRD: CPT

## 2024-11-19 PROCEDURE — 3077F SYST BP >= 140 MM HG: CPT

## 2024-11-19 RX ORDER — CIPROFLOXACIN AND DEXAMETHASONE 3; 1 MG/ML; MG/ML
4 SUSPENSION/ DROPS AURICULAR (OTIC) 2 TIMES DAILY
Qty: 7.5 ML | Refills: 0 | Status: SHIPPED | OUTPATIENT
Start: 2024-11-19 | End: 2024-11-26

## 2024-11-19 ASSESSMENT — ENCOUNTER SYMPTOMS
CHILLS: 0
CONSTIPATION: 0
HEADACHES: 0
SHORTNESS OF BREATH: 0
WEAKNESS: 0
TROUBLE SWALLOWING: 0
UNEXPECTED WEIGHT CHANGE: 0
FREQUENCY: 0
CHEST TIGHTNESS: 0
ABDOMINAL PAIN: 0
ARTHRALGIAS: 0
FATIGUE: 0
DIAPHORESIS: 0
WOUND: 0
NAUSEA: 0
NERVOUS/ANXIOUS: 0
NUMBNESS: 0
MYALGIAS: 0
DIARRHEA: 0
DIFFICULTY URINATING: 0
PALPITATIONS: 0
POLYPHAGIA: 0
POLYDIPSIA: 0
RECTAL PAIN: 0

## 2024-11-19 ASSESSMENT — PATIENT HEALTH QUESTIONNAIRE - PHQ9
2. FEELING DOWN, DEPRESSED OR HOPELESS: NOT AT ALL
1. LITTLE INTEREST OR PLEASURE IN DOING THINGS: NOT AT ALL
SUM OF ALL RESPONSES TO PHQ9 QUESTIONS 1 AND 2: 0

## 2024-11-19 NOTE — PROGRESS NOTES
Subjective   Patient ID: Tierney Madrid is a 84 y.o. female who presents for Ear cleaned  (Pt is here today to get her ears cleaned. Left and right. ).    Presents today for bilateral ear irrigation.      Cerumen impaction: Bilateral cerumen impaction, seen and diagnosed by Dr. Leggett.  Given Rx Debrox drops which she has been using at home.  Bilateral external ear canals occluded with cerumen.  Irrigation of bilateral ears cleared impaction, significant irritation and erythema to bilateral external ear canals after irrigation.  Prescribed Ciprodex drops for prophylactic coverage.  Her  is going to purchase a home irrigation kit and continue weekly irrigation for couple weeks and then just as needed.     Hot Flashes: Patient is having multiple daily episodes of hot flashes.  She inquired about estrogen supplementation.  I do not think this is wise at this time due to her cardiac history, did discuss with her PCP, Dr. Leggett.  He will discuss again with her at follow-up appointment, I did explain to her the reasons for precaution with estrogen and cardiac disease.  And she did express understanding.    Review of Systems   Constitutional:  Negative for chills, diaphoresis, fatigue and unexpected weight change.   HENT:  Negative for dental problem, tinnitus and trouble swallowing.    Eyes:  Negative for visual disturbance.   Respiratory:  Negative for chest tightness and shortness of breath.    Cardiovascular:  Negative for chest pain, palpitations and leg swelling.   Gastrointestinal:  Negative for abdominal pain, constipation, diarrhea, nausea and rectal pain.   Endocrine: Positive for cold intolerance and heat intolerance. Negative for polydipsia, polyphagia and polyuria.   Genitourinary:  Negative for difficulty urinating, frequency and urgency.   Musculoskeletal:  Negative for arthralgias and myalgias.   Skin:  Negative for pallor and wound.   Neurological:  Negative for syncope, weakness, numbness and  headaches.   Psychiatric/Behavioral:  Negative for suicidal ideas. The patient is not nervous/anxious.        Objective   /80   Pulse 77   Wt 57.5 kg (126 lb 12.8 oz)   SpO2 95%   BMI 25.61 kg/m²     Physical Exam  Constitutional:       Appearance: Normal appearance.   HENT:      Right Ear: There is impacted cerumen.      Left Ear: There is impacted cerumen.      Nose: Nose normal.      Mouth/Throat:      Mouth: Mucous membranes are dry.   Cardiovascular:      Rate and Rhythm: Normal rate and regular rhythm.   Pulmonary:      Effort: Pulmonary effort is normal.      Breath sounds: Normal breath sounds.   Neurological:      General: No focal deficit present.      Mental Status: She is alert and oriented to person, place, and time. Mental status is at baseline.   Psychiatric:         Mood and Affect: Mood normal.         Behavior: Behavior normal.         Thought Content: Thought content normal.         Judgment: Judgment normal.         Assessment/Plan   Diagnoses and all orders for this visit:  Acute noninfective otitis externa of both ears, unspecified type  Acute contact otitis externa of both ears  -     ciprofloxacin-dexamethasone (CiproDEX) otic suspension; Administer 4 drops into each ear 2 times a day for 7 days.  Bilateral impacted cerumen  Hot flashes due to menopause

## 2024-11-22 ENCOUNTER — APPOINTMENT (OUTPATIENT)
Dept: PRIMARY CARE | Facility: CLINIC | Age: 84
End: 2024-11-22
Payer: MEDICARE

## 2024-11-25 ENCOUNTER — PATIENT MESSAGE (OUTPATIENT)
Dept: PRIMARY CARE | Facility: CLINIC | Age: 84
End: 2024-11-25
Payer: MEDICARE

## 2024-12-12 ENCOUNTER — TELEPHONE (OUTPATIENT)
Dept: PRIMARY CARE | Facility: CLINIC | Age: 84
End: 2024-12-12
Payer: MEDICARE

## 2024-12-12 DIAGNOSIS — M48.061 SPINAL STENOSIS OF LUMBAR REGION, UNSPECIFIED WHETHER NEUROGENIC CLAUDICATION PRESENT: ICD-10-CM

## 2024-12-12 DIAGNOSIS — K21.9 GASTROESOPHAGEAL REFLUX DISEASE, UNSPECIFIED WHETHER ESOPHAGITIS PRESENT: ICD-10-CM

## 2024-12-12 RX ORDER — OXYCODONE HYDROCHLORIDE 10 MG/1
10 TABLET ORAL 3 TIMES DAILY
Qty: 90 TABLET | Refills: 0 | Status: SHIPPED | OUTPATIENT
Start: 2024-12-12 | End: 2025-01-11

## 2024-12-12 RX ORDER — PANTOPRAZOLE SODIUM 40 MG/1
40 TABLET, DELAYED RELEASE ORAL DAILY
Qty: 30 TABLET | Refills: 0 | OUTPATIENT
Start: 2024-12-12

## 2025-01-13 DIAGNOSIS — M48.061 SPINAL STENOSIS OF LUMBAR REGION, UNSPECIFIED WHETHER NEUROGENIC CLAUDICATION PRESENT: ICD-10-CM

## 2025-01-13 RX ORDER — OXYCODONE HYDROCHLORIDE 10 MG/1
10 TABLET ORAL 3 TIMES DAILY
Qty: 90 TABLET | Refills: 0 | Status: SHIPPED | OUTPATIENT
Start: 2025-01-13 | End: 2025-02-12

## 2025-01-13 NOTE — TELEPHONE ENCOUNTER
Medication Refill Request    Medication:  oxycodone    Pharmacy:  [facility]    Last OV:  10/31/24  Upcoming appointment:  2/11/25    ORDER PENDED

## 2025-01-16 DIAGNOSIS — K21.9 GASTROESOPHAGEAL REFLUX DISEASE, UNSPECIFIED WHETHER ESOPHAGITIS PRESENT: ICD-10-CM

## 2025-01-16 RX ORDER — PANTOPRAZOLE SODIUM 40 MG/1
40 TABLET, DELAYED RELEASE ORAL DAILY
Qty: 90 TABLET | Refills: 3 | Status: SHIPPED | OUTPATIENT
Start: 2025-01-16

## 2025-01-16 NOTE — TELEPHONE ENCOUNTER
Medication Refill Request    Medication:  pantoprazole    Pharmacy:  [facility]    Last OV:  10/31/24  Upcoming appointment:  2/11/25    ORDER PENDED

## 2025-02-11 ENCOUNTER — APPOINTMENT (OUTPATIENT)
Dept: PRIMARY CARE | Facility: CLINIC | Age: 85
End: 2025-02-11
Payer: MEDICARE

## 2025-02-11 VITALS
OXYGEN SATURATION: 98 % | WEIGHT: 129.2 LBS | SYSTOLIC BLOOD PRESSURE: 160 MMHG | BODY MASS INDEX: 26.1 KG/M2 | HEART RATE: 90 BPM | DIASTOLIC BLOOD PRESSURE: 100 MMHG

## 2025-02-11 DIAGNOSIS — Z88.9 HISTORY OF SEASONAL ALLERGIES: ICD-10-CM

## 2025-02-11 DIAGNOSIS — M48.061 SPINAL STENOSIS OF LUMBAR REGION, UNSPECIFIED WHETHER NEUROGENIC CLAUDICATION PRESENT: ICD-10-CM

## 2025-02-11 DIAGNOSIS — R60.0 BILATERAL EDEMA OF LOWER EXTREMITY: ICD-10-CM

## 2025-02-11 DIAGNOSIS — F32.4 MAJOR DEPRESSIVE DISORDER WITH SINGLE EPISODE, IN PARTIAL REMISSION (CMS-HCC): ICD-10-CM

## 2025-02-11 DIAGNOSIS — I10 PRIMARY HYPERTENSION: ICD-10-CM

## 2025-02-11 PROCEDURE — 1036F TOBACCO NON-USER: CPT | Performed by: FAMILY MEDICINE

## 2025-02-11 PROCEDURE — 1159F MED LIST DOCD IN RCRD: CPT | Performed by: FAMILY MEDICINE

## 2025-02-11 PROCEDURE — 3077F SYST BP >= 140 MM HG: CPT | Performed by: FAMILY MEDICINE

## 2025-02-11 PROCEDURE — 99214 OFFICE O/P EST MOD 30 MIN: CPT | Performed by: FAMILY MEDICINE

## 2025-02-11 PROCEDURE — 3080F DIAST BP >= 90 MM HG: CPT | Performed by: FAMILY MEDICINE

## 2025-02-11 RX ORDER — SERTRALINE HYDROCHLORIDE 50 MG/1
50 TABLET, FILM COATED ORAL DAILY
Qty: 30 TABLET | Refills: 11 | Status: SHIPPED | OUTPATIENT
Start: 2025-02-11 | End: 2026-02-11

## 2025-02-11 RX ORDER — LISINOPRIL 40 MG/1
40 TABLET ORAL DAILY
Qty: 90 TABLET | Refills: 3 | Status: SHIPPED | OUTPATIENT
Start: 2025-02-11 | End: 2026-02-11

## 2025-02-11 RX ORDER — OXYCODONE HYDROCHLORIDE 10 MG/1
10 TABLET ORAL 3 TIMES DAILY
Qty: 90 TABLET | Refills: 0 | Status: SHIPPED | OUTPATIENT
Start: 2025-02-13 | End: 2025-03-15

## 2025-02-11 RX ORDER — FLUTICASONE PROPIONATE 50 MCG
1 SPRAY, SUSPENSION (ML) NASAL 2 TIMES DAILY
Qty: 16 G | Refills: 11 | Status: SHIPPED | OUTPATIENT
Start: 2025-02-11 | End: 2025-03-13

## 2025-02-11 RX ORDER — CARVEDILOL 12.5 MG/1
12.5 TABLET ORAL
Qty: 60 TABLET | Refills: 2 | Status: SHIPPED | OUTPATIENT
Start: 2025-02-11

## 2025-02-11 RX ORDER — FUROSEMIDE 20 MG/1
20 TABLET ORAL DAILY
Qty: 30 TABLET | Refills: 0 | Status: SHIPPED | OUTPATIENT
Start: 2025-02-11 | End: 2025-03-13

## 2025-02-11 NOTE — PROGRESS NOTES
Subjective   Patient ID: Tierney Madrid is a 84 y.o. female who presents for Hypertension, GERD, and Spinal Stenosis for lumbar.    HPI       OARRS:  Sami Leggett MD on Feb 11, 2025   I have personally reviewed the OARRS report for Tierney Madrid. I have considered the risks of abuse, dependence, addiction and diversion     Is the patient prescribed a combination of a benzodiazepine and opioid?  No     Last Urine Drug Screen   Results are as expected.   10/31/24     Controlled Substance Agreement:  Date of the Last Agreement: reviewed May 6, 2024   Reviewed Controlled Substance Agreement including but not limited to the benefits, risks, and alternatives to treatment with a Controlled Substance medication(s).     Opioids:  What is the patient's goal of therapy? To relieve pain and increase function  Is this being achieved with current treatment? yes     I have calculated the patient's Morphine Dose Equivalent (MED):   I have considered referral to Pain Management and/or a specialist, and do not feel it is necessary at this time.     I feel that it is clinically indicated to continue this current medication regimen after consideration of alternative therapies, and other non-opioid treatment.     Opioid Risk Screening: 3/39/23     Pain Assessment:  Analgesia  What was your pain level on average during the past week?:  9  What was your pain level at its worst during the past week?: 10 - Pain as bad as it can be  What percentage of your pain has been relieved during the past week?: 80 %  Is the amount of pain relief you are now obtaining from your current pain relievers enough to make a real difference in your life?: Y  Query to Clinician: Is the patient's pain relief clinically significant?: Yes     Activities of Daily Living  Physical Functioning: better  Family Relationships: Same  Social Relationships: Same  Mood: better  Sleep Patterns: better  Overall Functioning: better     Adverse Events  Is patient  experiencing any side effects from current pain relievers?: N    Assessment  Is your overall impression that this patient is benefiting from opioid therapy?: Yes  Specific Analgesic Plan: Continue present regimen     Htn      Sees cardiology at least 2x per year      No changes in blood pressure     Was starting to run high 2 night     In more pian in ears after she had ears cleaned out 1 months   Hurts to yawn   Dentures not fitting right   Needs tooth extraction on lower   On blood thinner         Gerd occ symptoms on protonix      Edema about the abe    Not wearing reddy hose but usually wears during the day      CAD     Last nitroglycerin 3 night ago for angina     Stent was  November 2024 needs to hold for 12 months      Increase lisinopril     Hot flashes unable to tolerate gabapentin     Osteoporosis        Depressed and would like to increase to 50 mg   14/14 days depressed         Review of Systems    Objective   BP (!) 160/100   Pulse 90   Wt 58.6 kg (129 lb 3.2 oz)   SpO2 98%   BMI 26.10 kg/m²     Physical Exam  Vitals reviewed.   Constitutional:       Appearance: Normal appearance.   HENT:      Head: Normocephalic and atraumatic.   Eyes:      Conjunctiva/sclera: Conjunctivae normal.   Cardiovascular:      Rate and Rhythm: Normal rate and regular rhythm.   Pulmonary:      Effort: Pulmonary effort is normal.      Breath sounds: Normal breath sounds.   Musculoskeletal:      Cervical back: Neck supple.   Skin:     General: Skin is warm and dry.   Neurological:      General: No focal deficit present.      Mental Status: She is alert and oriented to person, place, and time.   Psychiatric:         Mood and Affect: Mood normal.         Behavior: Behavior normal.         Thought Content: Thought content normal.         Judgment: Judgment normal.         Assessment/Plan   Diagnoses and all orders for this visit:  Bilateral edema of lower extremity  -     furosemide (Lasix) 20 mg tablet; Take 1 tablet (20 mg) by  mouth once daily.  History of seasonal allergies  -     fluticasone (Flonase) 50 mcg/actuation nasal spray; Administer 1 spray into each nostril 2 times a day. Instill 1 squirt  Primary hypertension  -     carvedilol (Coreg) 12.5 mg tablet; Take 1 tablet (12.5 mg) by mouth 2 times daily (morning and late afternoon). other  -     lisinopril 40 mg tablet; Take 1 tablet (40 mg) by mouth once daily.  -     Comprehensive Metabolic Panel; Future  -     CBC; Future  -     Lipid Panel; Future  Spinal stenosis of lumbar region, unspecified whether neurogenic claudication present  -     oxyCODONE (Roxicodone) 10 mg immediate release tablet; Take 1 tablet (10 mg) by mouth 3 times a day. Do not fill before February 13, 2025.  Major depressive disorder with single episode, in partial remission ([facility])  -     sertraline (Zoloft) 50 mg tablet; Take 1 tablet (50 mg) by mouth once daily.    Reviewed neck exercises for her kypyosis

## 2025-03-13 DIAGNOSIS — M48.061 SPINAL STENOSIS OF LUMBAR REGION, UNSPECIFIED WHETHER NEUROGENIC CLAUDICATION PRESENT: ICD-10-CM

## 2025-03-13 RX ORDER — OXYCODONE HYDROCHLORIDE 10 MG/1
10 TABLET ORAL 3 TIMES DAILY
Qty: 90 TABLET | Refills: 0 | Status: SHIPPED | OUTPATIENT
Start: 2025-03-13 | End: 2025-04-12

## 2025-03-19 DIAGNOSIS — G25.81 RESTLESS LEG SYNDROME: ICD-10-CM

## 2025-03-19 RX ORDER — PRAMIPEXOLE DIHYDROCHLORIDE 0.5 MG/1
1 TABLET ORAL DAILY
Qty: 60 TABLET | Refills: 11 | Status: SHIPPED | OUTPATIENT
Start: 2025-03-19 | End: 2026-03-19

## 2025-03-19 NOTE — TELEPHONE ENCOUNTER
pramipexole (Mirapex) 0.5 mg tablet [[id number]]    Order Details  Dose: 1 mg Route: oral Frequency: Daily   Dispense Quantity: 60 tablet Refills: 11    Indications of Use: restless leg syndrome         Sig: Take 2 tablets (1 mg) by mouth once daily.     [facility]    Last OV:  2/11/25  Next appointment:  5/16/25    Order pending   rolling walker (5 inch wheels)

## 2025-03-19 NOTE — TELEPHONE ENCOUNTER
pramipexole (Mirapex) 0.5 mg tablet [[id number]]    Order Details  Dose: 1 mg Route: oral Frequency: Daily   Dispense Quantity: 60 tablet Refills: 11    Indications of Use: restless leg syndrome         Sig: Take 2 tablets (1 mg) by mouth once daily.   [facility]. THANK YOU.

## 2025-03-25 ENCOUNTER — APPOINTMENT (OUTPATIENT)
Dept: CARDIOLOGY | Facility: CLINIC | Age: 85
End: 2025-03-25
Payer: MEDICARE

## 2025-03-25 VITALS
HEART RATE: 91 BPM | HEIGHT: 59 IN | SYSTOLIC BLOOD PRESSURE: 158 MMHG | DIASTOLIC BLOOD PRESSURE: 82 MMHG | OXYGEN SATURATION: 99 % | BODY MASS INDEX: 25.2 KG/M2 | WEIGHT: 125 LBS

## 2025-03-25 DIAGNOSIS — I25.10 CORONARY ARTERY DISEASE INVOLVING NATIVE CORONARY ARTERY OF NATIVE HEART WITHOUT ANGINA PECTORIS: ICD-10-CM

## 2025-03-25 DIAGNOSIS — I10 PRIMARY HYPERTENSION: ICD-10-CM

## 2025-03-25 DIAGNOSIS — I10 UNCONTROLLED HYPERTENSION: ICD-10-CM

## 2025-03-25 DIAGNOSIS — I10 HYPERTENSION, UNSPECIFIED TYPE: ICD-10-CM

## 2025-03-25 DIAGNOSIS — R60.0 BILATERAL EDEMA OF LOWER EXTREMITY: Primary | ICD-10-CM

## 2025-03-25 DIAGNOSIS — R07.89 OTHER CHEST PAIN: ICD-10-CM

## 2025-03-25 DIAGNOSIS — G93.41 METABOLIC ENCEPHALOPATHY: ICD-10-CM

## 2025-03-25 DIAGNOSIS — I25.119 CORONARY ARTERY DISEASE INVOLVING NATIVE CORONARY ARTERY OF NATIVE HEART WITH ANGINA PECTORIS: ICD-10-CM

## 2025-03-25 DIAGNOSIS — E78.5 HYPERLIPIDEMIA, UNSPECIFIED HYPERLIPIDEMIA TYPE: ICD-10-CM

## 2025-03-25 DIAGNOSIS — I65.21 STENOSIS OF RIGHT CAROTID ARTERY: ICD-10-CM

## 2025-03-25 PROCEDURE — 3077F SYST BP >= 140 MM HG: CPT | Performed by: STUDENT IN AN ORGANIZED HEALTH CARE EDUCATION/TRAINING PROGRAM

## 2025-03-25 PROCEDURE — 1036F TOBACCO NON-USER: CPT | Performed by: STUDENT IN AN ORGANIZED HEALTH CARE EDUCATION/TRAINING PROGRAM

## 2025-03-25 PROCEDURE — 3079F DIAST BP 80-89 MM HG: CPT | Performed by: STUDENT IN AN ORGANIZED HEALTH CARE EDUCATION/TRAINING PROGRAM

## 2025-03-25 PROCEDURE — 99215 OFFICE O/P EST HI 40 MIN: CPT | Performed by: STUDENT IN AN ORGANIZED HEALTH CARE EDUCATION/TRAINING PROGRAM

## 2025-03-25 PROCEDURE — 93000 ELECTROCARDIOGRAM COMPLETE: CPT | Performed by: STUDENT IN AN ORGANIZED HEALTH CARE EDUCATION/TRAINING PROGRAM

## 2025-03-25 PROCEDURE — 1159F MED LIST DOCD IN RCRD: CPT | Performed by: STUDENT IN AN ORGANIZED HEALTH CARE EDUCATION/TRAINING PROGRAM

## 2025-03-25 PROCEDURE — 1160F RVW MEDS BY RX/DR IN RCRD: CPT | Performed by: STUDENT IN AN ORGANIZED HEALTH CARE EDUCATION/TRAINING PROGRAM

## 2025-03-25 NOTE — PROGRESS NOTES
Chief Complaint   Patient presents with    Chest Pain     Has been taking nitroglycerin but that has not had to take any for about a week. Pt was taking them every 4 days when having pain going down her arm.      HPI:  I was requested by Dr. Leggett to evaluate this patient in consultation for cardiac assessment.     Mrs. Tierney Madrid is an 85 y.o. non-smoker female with prior medical history significant for TIA, CAD S/P successful PCI to LAD (2 RONALD) and RCA (1 RONALD) for in-stent restenosis. Patient with prior medical history significant for hypertension, hyperlipidemia, coronary artery disease (stents to the RCA 12/2016, mid LAD 11/2017, RCA October 2018 in September 2019), supraventricular tachyarrhythmia. Patient was complaining of worsening dyspnea on exertion and substernal chest discomfort with radiation to should and neck. She underwent an uneventful procedure S/P PCI to LAD and RCA. Her BP was extremely high during the procedure, with systolic BP ~240mmHg, that went down to 160mmHg after sedation, amlodipine, nitroglycerin and IV hydralazine. She was loaded with Lisinopril 20mg daily after the procedure, and also with Plavix 300mg and ASA. Patient was somnolent after the procedure, we initially thought it was because of the residual sedation. However, after 3 hours after the procedure, she was more somnolent. Was given IV Naloxone and Flumazenil. Afterward that, she developed difficulty speaking and became very agitated. We called the stroke team. Her NIH was initially 9. CT brain did not show signs of bleeding. Stroke call suggested to keep medical therapy at that point. Patient had some improvement in her symptoms, able to speak and more conscious, and was admitted for clinical compensation.      Notably, during the hospitalization, the patient had TIA/CVA symptoms but all the tests, including the MRI, were completely normal. This was ultimately attributed to adverse reaction to sedation. Upon discharge, she  was recovering well and today she comes back to the appointment feeling completely normal. She had femur fracture and rib fracture due to falls after being discharged, but she is currently feeling fine.  She does not have any sequels from that episode.  She has been feeling fine and she is currently asymptomatic from the cardiovascular standpoint.  She is compliant with the medication. She denies chest pain, shortness of breath, palpitations, lightheadedness, headaches, fever, chills, orthopnea, paroxysmal nocturnal dyspnea or syncope.     Patient returns today stating that is feeling well. She has no residual neurological damage. However, in 03/2025, she complained of a few episodes of sharp chest pain that went away with Nitroglycerin. It has been over 2 weeks without any episodes of chest pain. She is compliant with the medication.    Past Medical History  Past Medical History:   Diagnosis Date    Acute myocardial infarction, unspecified     Acute myocardial infarction    Tinea unguium 09/12/2022    Onychomycosis of toenail       Past Surgical History  Past Surgical History:   Procedure Laterality Date    CARDIAC CATHETERIZATION N/A 6/24/2024    Procedure: Left Heart Cath;  Surgeon: Ishaan Rocha MD;  Location: [facility] Cardiac Cath Lab;  Service: Cardiovascular;  Laterality: N/A;  8am    CARDIAC CATHETERIZATION N/A 6/24/2024    Procedure: PCI;  Surgeon: Ishaan Rocha MD;  Location: [facility] Cardiac Cath Lab;  Service: Cardiovascular;  Laterality: N/A;    CARDIAC CATHETERIZATION N/A 6/24/2024    Procedure: IVUS - Coronary;  Surgeon: Ishaan Rocha MD;  Location: [facility] Cardiac Cath Lab;  Service: Cardiovascular;  Laterality: N/A;    OTHER SURGICAL HISTORY  11/15/2019    Hip replacement    OTHER SURGICAL HISTORY  11/15/2019    Lumbar laminectomy    OTHER SURGICAL HISTORY  11/15/2019    Colonoscopy    OTHER SURGICAL HISTORY  12/01/2022    Cardiac catheterization with stent placement  "      Past Family History  Family History   Problem Relation Name Age of Onset    Heart disease Mother      Hypertension Mother      Lung cancer Mother      Clotting disorder Father      Heart disease Father      Stroke Father      Heart failure Father      Hypertension Father         Allergy History  Allergies   Allergen Reactions    Amoxicillin Unknown and Nausea/vomiting    Diazepam Unknown, Other and Seizure     Causes - Seizures    Gabapentin Other     Shaky    Hydralazine Headache     HEADACHES   CHEST PAIN    Morphine Confusion and Other     PT STATES \"IT MAKES MY HEAD FEEL WEIRD\"    Nitrofurantoin Hives    Promethazine Unknown    Zolpidem Unknown, Insomnia and Other     Insomnia    Doxycycline Nausea/vomiting       Past Social History  Social History     Socioeconomic History    Marital status:    Tobacco Use    Smoking status: Never    Smokeless tobacco: Never   Vaping Use    Vaping status: Never Used   Substance and Sexual Activity    Alcohol use: Not Currently    Drug use: Never    Sexual activity: Defer     Social Drivers of Health     Financial Resource Strain: Patient Unable To Answer (6/24/2024)    Overall Financial Resource Strain (CARDIA)     Difficulty of Paying Living Expenses: Patient unable to answer   Food Insecurity: No Food Insecurity (7/16/2024)    Received from [facility]    Hunger Vital Sign     Worried About Running Out of Food in the Last Year: Never true     Ran Out of Food in the Last Year: Never true   Transportation Needs: No Transportation Needs (7/16/2024)    Received from [facility]    PRAPARE - Transportation     Lack of Transportation (Medical): No     Lack of Transportation (Non-Medical): No   Intimate Partner Violence: Not At Risk (7/16/2024)    Received from [facility]    Humiliation, Afraid, Rape, and Kick questionnaire     Fear of Current or Ex-Partner: No     Emotionally Abused: No     Physically Abused: No     Sexually Abused: No   Housing Stability: Patient Unable " "To Answer (6/24/2024)    Housing Stability Vital Sign     Unable to Pay for Housing in the Last Year: Patient unable to answer     Number of Places Lived in the Last Year: 1     Unstable Housing in the Last Year: Patient unable to answer       Social History     Tobacco Use   Smoking Status Never   Smokeless Tobacco Never       Objective Data:  Last Recorded Vitals:  Vitals:    03/25/25 1106   BP: 158/82   Pulse: 91   SpO2: 99%   Weight: 56.7 kg (125 lb)   Height: 1.499 m (4' 11\")       Last Labs:  CBC - 6/28/2024:  6:00 AM  6.0 9.2 187    29.4      CMP - 6/28/2024:  5:52 AM  8.2 5.7 19 --- 0.4   4.5 3.2 10 68      PTT - No results in last year.  1.0   11.9 _     TROPHS   Date/Time Value Ref Range Status   06/26/2024 07:20  0 - 13 ng/L Final   05/20/2024 04:24 PM 4 0 - 13 ng/L Final   02/13/2024 10:15 AM 4 0 - 13 ng/L Final     BNP   Date/Time Value Ref Range Status   06/24/2024 04:15 PM 97 0 - 99 pg/mL Final   05/20/2024 04:24 PM 37 0 - 99 pg/mL Final     HGBA1C   Date/Time Value Ref Range Status   06/24/2024 04:15 PM 5.9 see below % Final     LDLCALC   Date/Time Value Ref Range Status   06/24/2024 04:15  <=99 mg/dL Final     Comment:                                 Near   Borderline      AGE      Desirable  Optimal    High     High     Very High     0-19 Y     0 - 109     ---    110-129   >/= 130     ----    20-24 Y     0 - 119     ---    120-159   >/= 160     ----      >24 Y     0 -  99   100-129  130-159   160-189     >/=190       VLDL   Date/Time Value Ref Range Status   06/24/2024 04:15 PM 28 0 - 40 mg/dL Final        Patient Medications:  Outpatient Encounter Medications as of 3/25/2025   Medication Sig Dispense Refill    acetaminophen (Tylenol) 500 mg tablet Take 2 tablets (1,000 mg) by mouth every 8 hours if needed for mild pain (1 - 3).      amLODIPine (Norvasc) 10 mg tablet Take 1 tablet (10 mg) by mouth once daily. 30 tablet 11    aspirin 81 mg EC tablet Take 1 tablet (81 mg) by mouth once " daily.      atorvastatin (Lipitor) 40 mg tablet Take 1 tablet (40 mg) by mouth once daily. 100 tablet 3    carvedilol (Coreg) 12.5 mg tablet Take 1 tablet (12.5 mg) by mouth 2 times daily (morning and late afternoon). other 60 tablet 2    cetirizine (ZyrTEC) 5 mg tablet Take 1 tablet (5 mg) by mouth once daily.      clopidogrel (Plavix) 75 mg tablet Take 1 tablet (75 mg) by mouth once daily. 30 tablet 11    fluticasone (Flonase) 50 mcg/actuation nasal spray Administer 1 spray into each nostril 2 times a day. Instill 1 squirt 16 g 11    furosemide (Lasix) 20 mg tablet Take 1 tablet (20 mg) by mouth once daily. 30 tablet 0    isosorbide mononitrate ER (Imdur) 60 mg 24 hr tablet Take 1 tablet (60 mg) by mouth once daily. 30 tablet 11    lisinopril 40 mg tablet Take 1 tablet (40 mg) by mouth once daily. 90 tablet 3    multivitamin tablet Take 1 tablet by mouth once daily.      naloxone (Narcan) 4 mg/0.1 mL nasal spray Administer 1 spray (4 mg) into affected nostril(s) if needed (unresponsiveness).      nitroglycerin (Nitrostat) 0.4 mg SL tablet Place 1 tablet (0.4 mg) under the tongue every 5 minutes if needed for chest pain. For up to 3 doses as needed for chest pain. Call 911 if pain persists 25 tablet 0    oxyCODONE (Roxicodone) 10 mg immediate release tablet Take 1 tablet (10 mg) by mouth 3 times a day. 90 tablet 0    pantoprazole (ProtoNix) 40 mg EC tablet Take 1 tablet (40 mg) by mouth once daily. 90 tablet 3    pramipexole (Mirapex) 0.5 mg tablet Take 2 tablets (1 mg) by mouth once daily. 60 tablet 11    sertraline (Zoloft) 50 mg tablet Take 1 tablet (50 mg) by mouth once daily. 30 tablet 11    [DISCONTINUED] pramipexole (Mirapex) 0.5 mg tablet Take 2 tablets (1 mg) by mouth once daily. 60 tablet 11     No facility-administered encounter medications on file as of 3/25/2025.       Physical Exam:  General: alert, oriented and in no acute distress  HEENT: NC/AT; EOMI; PERRLA, external ear is normal  Neck: supple;  trachea midline; no masses; no JVD  Chest: clear breath sounds bilaterally; no wheezing  Cardio: regular rhythm, S1S2 normal, no murmurs  Abdomen: Soft, non-tender, non-distension, no organomegaly  Extremities: no clubbing/cyanosis/edema  Neuro: Grossly intact     Psychiatric: Normal mood and affect     Past Cardiology Results (Last 3 Years):  EKG:  ECG 12 lead (Clinic Performed) 07/08/2024      ECG 12 Lead 06/26/2024      ECG 12 Lead 06/26/2024      Electrocardiogram 12 Lead 06/24/2024      ECG 12 lead STAT 06/24/2024      ECG 12 lead 05/20/2024      ECG 12 lead 02/13/2024      ECG 12 lead 02/13/2024    Echo:  Echo Results:  No results found for this or any previous visit from the past 365 days.     Cath:  Cardiac Catheterization Procedure 06/24/2024    CV NCDR CATHPCI V5 COLLECTION FORM   Stress Test:  No results found for this or any previous visit from the past 1095 days.    Cardiac Imaging:  No results found for this or any previous visit from the past 1095 days.       Assessment/Plan     Mrs. Tierney Madrid is an 85 y.o. non-smoker female with prior medical history significant for TIA, CAD S/P successful PCI to LAD (2 RONALD) and RCA (1 RONALD) for in-stent restenosis. Patient with prior medical history significant for hypertension, hyperlipidemia, coronary artery disease (stents to the RCA 12/2016, mid LAD 11/2017, RCA October 2018 in September 2019), supraventricular tachyarrhythmia. Patient was complaining of worsening dyspnea on exertion and substernal chest discomfort with radiation to should and neck. She underwent an uneventful procedure S/P PCI to LAD and RCA. Her BP was extremely high during the procedure, with systolic BP ~240mmHg, that went down to 160mmHg after sedation, amlodipine, nitroglycerin and IV hydralazine. She was loaded with Lisinopril 20mg daily after the procedure, and also with Plavix 300mg and ASA. Patient was somnolent after the procedure, we initially thought it was because of the  residual sedation. However, after 3 hours after the procedure, she was more somnolent. Was given IV Naloxone and Flumazenil. Afterward that, she developed difficulty speaking and became very agitated. We called the stroke team. Her NIH was initially 9. CT brain did not show signs of bleeding. Stroke call suggested to keep medical therapy at that point. Patient had some improvement in her symptoms, able to speak and more conscious, and was admitted for clinical compensation.      Assessment     # Atherosclerosis / CAD S/P PCI to LAD and RCA (06/24/2024) / Prior stents to the RCA 12/2016, mid LAD 11/2017, RCA October 2018 in September 2019  - Patient with known atherosclerotic disease, implying significant increased risk for major adverse cardiac events.  - Left Heart Catheterization (06/24/2024):  Multivessel coronary artery disease  Prox LAD 70% in-stent restenosis; distal LAD 90% in-stent restenosis  Mid LCx 50% lesion  Patent stent to 1st Dg  Ostial / prox RCA 80% in-stent restenosis  Preserved LV systolic function  S/P Successful PCI to distal and proximal LAD using 2 drug-eluting stents (RONALD), guided by intravascular ultrasound (IVUS)  S/P Successful PCI to ostial/prox RCA using one RONALD, guided by IVUS  - Notably, during the hospitalization, the patient had TIA/CVA symptoms but all the tests, including the MRI, were completely normal.  This was ultimately attributed to adverse reaction to sedation.  Upon discharge, she was recovering well and today she comes back to the appointment feeling completely normal. She had femur fracture and rib fracture due to falls after being discharged, but she is currently feeling fine.  She does not have any sequels from that episode.  She has been feeling fine and she is currently asymptomatic from the cardiovascular standpoint.  She is compliant with the medication.   - Patient returns today stating that is feeling well. She has no residual neurological damage. However, in  03/2025, she complained of a few episodes of sharp chest pain that went away with Nitroglycerin. It has been over 2 weeks without any episodes of chest pain. She is compliant with the medication.  - Would suggest to keep ASA 81mg daily, Clopidogrel 75mg daily, Atorvastatin 40mg daily, Carvedilol 12.5mg BID, Lisinopril 20mg daily.  - If bleeding risk is low, will keep her on DAPT; if bleeding risk is deemed to be high, we will switch it to SAPT with Clopidogrel.  - Follow up in 6 months.     # Hypertension  - Controlled BP.  - Would suggest to keep current medications including Amlodipine 10mg daily, Lisinopril 20mg daily, Carvedilol 12.5mg BID.  - Patient counseled to keep a healthy lifestyle including regular exercise and low-sodium diet.  - Recommended home blood pressure monitoring.  - Goal of BP < 130/80mmHg.      # Hyperlipidemia  - Keep home medication with Atorvastatin 40mg daily.  - Counseled on healthy diet and regular exercise.       Thank you for allowing me to participate in the care of this patient. Please reach me out if you have any questions or if you need any clarifications regarding the patient's care.      We have discussed the most common side effects of the prescribed medications, indications, drug interactions, risks, complications, and alternatives of medications/therapeutics were explained and discussed. The patient has been requested to monitor closely for any untoward side effects or complications of medications. The patient has been strongly advised to be compliant with the recommendations, all the questions and concerns have been addressed. The patient has been also instructed to call, to return sooner or to go to the emergency department if symptoms persist or get worsen. The patient voiced understanding and denies any further questions at this time.      This note was transcribed using the Dragon Dictation system. There may be grammatical, punctuation, or verbiage errors that occur with  voice recognition programs.     Counseling greater than 50% of visit regarding all cardiac issues.     Thank you, Dr. Leggett, for allowing me to participate in the care of this patient. Please do not hesitate to contact me with any further questions or concerns.     Ishaan Rocha MD  Cardiology

## 2025-03-28 ENCOUNTER — HOSPITAL ENCOUNTER (OUTPATIENT)
Dept: RADIOLOGY | Facility: HOSPITAL | Age: 85
Discharge: HOME | End: 2025-03-28
Payer: MEDICARE

## 2025-03-28 DIAGNOSIS — R05.3 CHRONIC COUGH: ICD-10-CM

## 2025-03-28 DIAGNOSIS — R05.3 CHRONIC COUGH: Primary | ICD-10-CM

## 2025-03-28 PROCEDURE — 71046 X-RAY EXAM CHEST 2 VIEWS: CPT

## 2025-03-31 ENCOUNTER — TELEPHONE (OUTPATIENT)
Dept: PRIMARY CARE | Facility: CLINIC | Age: 85
End: 2025-03-31
Payer: MEDICARE

## 2025-03-31 NOTE — TELEPHONE ENCOUNTER
Patient LVM and states she has been sick for about 3 weeks, coughing up green phlegm.  She is asking for an ATB    Please advise.

## 2025-04-01 ENCOUNTER — OFFICE VISIT (OUTPATIENT)
Dept: PRIMARY CARE | Facility: CLINIC | Age: 85
End: 2025-04-01
Payer: MEDICARE

## 2025-04-01 VITALS
WEIGHT: 128 LBS | OXYGEN SATURATION: 98 % | BODY MASS INDEX: 25.85 KG/M2 | SYSTOLIC BLOOD PRESSURE: 140 MMHG | HEART RATE: 94 BPM | DIASTOLIC BLOOD PRESSURE: 82 MMHG

## 2025-04-01 DIAGNOSIS — J40 BRONCHITIS: Primary | ICD-10-CM

## 2025-04-01 PROCEDURE — 3079F DIAST BP 80-89 MM HG: CPT | Performed by: FAMILY MEDICINE

## 2025-04-01 PROCEDURE — 3077F SYST BP >= 140 MM HG: CPT | Performed by: FAMILY MEDICINE

## 2025-04-01 PROCEDURE — 99213 OFFICE O/P EST LOW 20 MIN: CPT | Performed by: FAMILY MEDICINE

## 2025-04-01 RX ORDER — AZITHROMYCIN 250 MG/1
TABLET, FILM COATED ORAL
Qty: 6 TABLET | Refills: 0 | Status: SHIPPED | OUTPATIENT
Start: 2025-04-01

## 2025-04-01 RX ORDER — BENZONATATE 200 MG/1
200 CAPSULE ORAL 3 TIMES DAILY PRN
Qty: 42 CAPSULE | Refills: 0 | Status: SHIPPED | OUTPATIENT
Start: 2025-04-01 | End: 2025-05-01

## 2025-04-01 ASSESSMENT — ENCOUNTER SYMPTOMS
FEVER: 1
COUGH: 1
CHILLS: 1

## 2025-04-01 NOTE — PROGRESS NOTES
Subjective   Patient ID: Tierney Madrid is a 85 y.o. female who presents for Cough (Has had a productive cough with green phlegm and some nausea for 3 weeks.  Chest xray on 3/28/25 was normal ).    Cough  Associated symptoms include chills and a fever (last temp 100.6 1 week).        No data recorded    X 1 month   Green productive cough   Cxr normal last week   Doxycyline   1 week ago finished     Made no difference   Sob with with coughing      not sick   Review of Systems   Constitutional:  Positive for chills and fever (last temp 100.6 1 week).   Respiratory:  Positive for cough.        Objective   /82   Pulse 94   Wt 58.1 kg (128 lb)   SpO2 98%   BMI 25.85 kg/m²     Physical Exam  Vitals reviewed.   Constitutional:       Appearance: Normal appearance.   HENT:      Head: Normocephalic and atraumatic.      Right Ear: Tympanic membrane, ear canal and external ear normal.      Left Ear: Tympanic membrane and external ear normal. There is impacted cerumen (removed with curette).      Mouth/Throat:      Mouth: Mucous membranes are moist.      Pharynx: Oropharynx is clear. No oropharyngeal exudate or posterior oropharyngeal erythema.   Eyes:      Conjunctiva/sclera: Conjunctivae normal.   Cardiovascular:      Rate and Rhythm: Normal rate and regular rhythm.   Pulmonary:      Effort: Pulmonary effort is normal.      Breath sounds: Normal breath sounds.   Musculoskeletal:      Cervical back: Neck supple.   Skin:     General: Skin is warm and dry.   Neurological:      General: No focal deficit present.      Mental Status: She is alert and oriented to person, place, and time.   Psychiatric:         Mood and Affect: Mood normal.         Behavior: Behavior normal.         Thought Content: Thought content normal.         Judgment: Judgment normal.         Assessment/Plan   Diagnoses and all orders for this visit:  Bronchitis  -     azithromycin (Zithromax) 250 mg tablet; Take 2 tabs (500 mg) by mouth today,  than 1 daily for 4 days.  -     benzonatate (Tessalon) 200 mg capsule; Take 1 capsule (200 mg) by mouth 3 times a day as needed for cough. Do not crush or chew.

## 2025-04-08 ENCOUNTER — APPOINTMENT (OUTPATIENT)
Dept: CARDIOLOGY | Facility: CLINIC | Age: 85
End: 2025-04-08
Payer: MEDICARE

## 2025-04-10 ENCOUNTER — TELEPHONE (OUTPATIENT)
Dept: PRIMARY CARE | Facility: CLINIC | Age: 85
End: 2025-04-10
Payer: MEDICARE

## 2025-04-10 DIAGNOSIS — J40 BRONCHITIS: Primary | ICD-10-CM

## 2025-04-10 RX ORDER — CEPHALEXIN 500 MG/1
500 CAPSULE ORAL 2 TIMES DAILY
Qty: 20 CAPSULE | Refills: 0 | Status: SHIPPED | OUTPATIENT
Start: 2025-04-10 | End: 2025-04-20

## 2025-04-10 NOTE — TELEPHONE ENCOUNTER
Patient called in and said she is still coughing up green mucus. She has finished her z-gage but would like something else to help. She stated that her tessalon pearls aren't helping much and she is still bringing up a lot when coughing. Please advise, thanks!

## 2025-04-11 DIAGNOSIS — M48.061 SPINAL STENOSIS OF LUMBAR REGION, UNSPECIFIED WHETHER NEUROGENIC CLAUDICATION PRESENT: ICD-10-CM

## 2025-04-11 RX ORDER — OXYCODONE HYDROCHLORIDE 10 MG/1
10 TABLET ORAL 3 TIMES DAILY
Qty: 90 TABLET | Refills: 0 | Status: SHIPPED | OUTPATIENT
Start: 2025-04-11 | End: 2025-05-11

## 2025-04-11 NOTE — TELEPHONE ENCOUNTER
Medication Refill Request    Medication:  oxycodone     Pharmacy:  [facility]    Last OV:  4/1/25  Upcoming appointment:  5/16/25    ORDER PENDED

## 2025-05-09 DIAGNOSIS — M48.061 SPINAL STENOSIS OF LUMBAR REGION, UNSPECIFIED WHETHER NEUROGENIC CLAUDICATION PRESENT: ICD-10-CM

## 2025-05-09 RX ORDER — OXYCODONE HYDROCHLORIDE 10 MG/1
10 TABLET ORAL 3 TIMES DAILY
Qty: 90 TABLET | Refills: 0 | Status: SHIPPED | OUTPATIENT
Start: 2025-05-09 | End: 2025-06-08

## 2025-05-09 NOTE — TELEPHONE ENCOUNTER
Is requesting a refill on her oxycodone sent to [facility] in [place]. Call her with any questions. [phone]

## 2025-05-16 ENCOUNTER — APPOINTMENT (OUTPATIENT)
Dept: PRIMARY CARE | Facility: CLINIC | Age: 85
End: 2025-05-16
Payer: MEDICARE

## 2025-06-09 ENCOUNTER — TELEPHONE (OUTPATIENT)
Dept: PRIMARY CARE | Facility: CLINIC | Age: 85
End: 2025-06-09
Payer: MEDICARE

## 2025-06-09 DIAGNOSIS — M48.061 SPINAL STENOSIS OF LUMBAR REGION, UNSPECIFIED WHETHER NEUROGENIC CLAUDICATION PRESENT: ICD-10-CM

## 2025-06-09 RX ORDER — OXYCODONE HYDROCHLORIDE 10 MG/1
10 TABLET ORAL 3 TIMES DAILY
Qty: 90 TABLET | Refills: 0 | Status: SHIPPED | OUTPATIENT
Start: 2025-06-09 | End: 2025-07-09

## 2025-06-23 ENCOUNTER — APPOINTMENT (OUTPATIENT)
Dept: PRIMARY CARE | Facility: CLINIC | Age: 85
End: 2025-06-23
Payer: MEDICARE

## 2025-06-23 VITALS
SYSTOLIC BLOOD PRESSURE: 160 MMHG | OXYGEN SATURATION: 94 % | WEIGHT: 122.7 LBS | HEART RATE: 88 BPM | DIASTOLIC BLOOD PRESSURE: 80 MMHG | BODY MASS INDEX: 24.78 KG/M2

## 2025-06-23 DIAGNOSIS — I10 PRIMARY HYPERTENSION: ICD-10-CM

## 2025-06-23 DIAGNOSIS — R60.0 BILATERAL EDEMA OF LOWER EXTREMITY: ICD-10-CM

## 2025-06-23 DIAGNOSIS — Z88.9 HISTORY OF SEASONAL ALLERGIES: ICD-10-CM

## 2025-06-23 PROCEDURE — 1159F MED LIST DOCD IN RCRD: CPT | Performed by: FAMILY MEDICINE

## 2025-06-23 PROCEDURE — 99214 OFFICE O/P EST MOD 30 MIN: CPT | Performed by: FAMILY MEDICINE

## 2025-06-23 PROCEDURE — 3079F DIAST BP 80-89 MM HG: CPT | Performed by: FAMILY MEDICINE

## 2025-06-23 PROCEDURE — 3077F SYST BP >= 140 MM HG: CPT | Performed by: FAMILY MEDICINE

## 2025-06-23 RX ORDER — CARVEDILOL 25 MG/1
25 TABLET ORAL
Qty: 60 TABLET | Refills: 11 | Status: SHIPPED | OUTPATIENT
Start: 2025-06-23 | End: 2026-06-23

## 2025-06-23 RX ORDER — FUROSEMIDE 20 MG/1
20 TABLET ORAL DAILY
Qty: 30 TABLET | Refills: 1 | Status: SHIPPED | OUTPATIENT
Start: 2025-06-23 | End: 2025-07-23

## 2025-06-23 RX ORDER — CARVEDILOL 12.5 MG/1
12.5 TABLET ORAL
Qty: 200 TABLET | Refills: 2 | Status: SHIPPED | OUTPATIENT
Start: 2025-06-23 | End: 2025-06-23 | Stop reason: SDUPTHER

## 2025-06-23 RX ORDER — FLUTICASONE PROPIONATE 50 MCG
1 SPRAY, SUSPENSION (ML) NASAL 2 TIMES DAILY
Qty: 16 G | Refills: 11 | Status: SHIPPED | OUTPATIENT
Start: 2025-06-23 | End: 2025-07-23

## 2025-06-23 NOTE — PROGRESS NOTES
Subjective   Patient ID: Tierney Madrid is a 85 y.o. female who presents for Hypertension and Spinal stenosis of lumbar (UDS 10/31/24).  HPI  Patient Health Questionnaire-2 Score: 0 (6/23/2025  4:08 PM)     OARRS:  Sami Leggett MD on June 23, 2025   I have personally reviewed the OARRS report for Tierney Madrid. I have considered the risks of abuse, dependence, addiction and diversion     Is the patient prescribed a combination of a benzodiazepine and opioid?  No     Last Urine Drug Screen   Results are as expected.   10/31/24     Controlled Substance Agreement:  Date of the Last Agreement: reviewed June 23, 2025   Reviewed Controlled Substance Agreement including but not limited to the benefits, risks, and alternatives to treatment with a Controlled Substance medication(s).     Opioids:  What is the patient's goal of therapy? To relieve pain and increase function  Is this being achieved with current treatment? yes     I have calculated the patient's Morphine Dose Equivalent (MED):   I have considered referral to Pain Management and/or a specialist, and do not feel it is necessary at this time.     I feel that it is clinically indicated to continue this current medication regimen after consideration of alternative therapies, and other non-opioid treatment.     Opioid Risk Screening: 3/39/23     Pain Assessment:  Analgesia  What was your pain level on average during the past week?:  6  What was your pain level at its worst during the past week?: 10   What percentage of your pain has been relieved during the past week?:  90%  Is the amount of pain relief you are now obtaining from your current pain relievers enough to make a real difference in your life?: Y  Query to Clinician: Is the patient's pain relief clinically significant?: Yes     Activities of Daily Living  Physical Functioning: better  Family Relationships: Same  Social Relationships: Same  Mood: better  Sleep Patterns: better  Overall Functioning:  better     Adverse Events  Is patient experiencing any side effects from current pain relievers?: N     Assessment  Is your overall impression that this patient is benefiting from opioid therapy?: Yes  Specific Analgesic Plan: Continue present regimen     Htn      Sees cardiology at least 2x per year      No changes in blood pressure     Gerd occ symptoms on protonix      Edema about the abe    Not wearing reddy hose but usually wears during the day      CAD     Last nitroglycerin 2 days  ago for angina      Increase lisinopril          Osteoporosis     Review of Systems    Objective   Visit Vitals  /80   Pulse 88   Wt 55.7 kg (122 lb 11.2 oz)   SpO2 94%   BMI 24.78 kg/m²   OB Status Hysterectomy   Smoking Status Never   BSA 1.52 m²       Physical Exam         Assessment/Plan   Diagnoses and all orders for this visit:  Primary hypertension  -     carvedilol (Coreg) 25 mg tablet; Take 1 tablet (25 mg) by mouth 2 times daily (morning and late afternoon). other  Bilateral edema of lower extremity  -     furosemide (Lasix) 20 mg tablet; Take 1 tablet (20 mg) by mouth once daily.  History of seasonal allergies  -     fluticasone (Flonase) 50 mcg/actuation nasal spray; Administer 1 spray into each nostril 2 times a day. Instill 1 squirt           Sami Leggett MD 06/23/25 4:16 PM

## 2025-07-03 ENCOUNTER — TELEPHONE (OUTPATIENT)
Dept: PRIMARY CARE | Facility: CLINIC | Age: 85
End: 2025-07-03
Payer: MEDICARE

## 2025-07-03 DIAGNOSIS — R11.0 NAUSEA: Primary | ICD-10-CM

## 2025-07-03 RX ORDER — ONDANSETRON 4 MG/1
4 TABLET, FILM COATED ORAL EVERY 8 HOURS PRN
Qty: 20 TABLET | Refills: 0 | Status: SHIPPED | OUTPATIENT
Start: 2025-07-03 | End: 2025-07-10

## 2025-07-03 NOTE — TELEPHONE ENCOUNTER
Message from patient stating she's been nauseous x2 days.  Would like a Rx sent in to help with that.    [facility]

## 2025-07-09 DIAGNOSIS — M48.061 SPINAL STENOSIS OF LUMBAR REGION, UNSPECIFIED WHETHER NEUROGENIC CLAUDICATION PRESENT: ICD-10-CM

## 2025-07-09 RX ORDER — OXYCODONE HYDROCHLORIDE 10 MG/1
10 TABLET ORAL 3 TIMES DAILY
Qty: 90 TABLET | Refills: 0 | Status: SHIPPED | OUTPATIENT
Start: 2025-07-09 | End: 2025-08-08

## 2025-07-27 ENCOUNTER — APPOINTMENT (OUTPATIENT)
Dept: RADIOLOGY | Facility: HOSPITAL | Age: 85
End: 2025-07-27
Payer: MEDICARE

## 2025-07-27 ENCOUNTER — HOSPITAL ENCOUNTER (EMERGENCY)
Facility: HOSPITAL | Age: 85
Discharge: HOME | End: 2025-07-27
Payer: MEDICARE

## 2025-07-27 VITALS
DIASTOLIC BLOOD PRESSURE: 66 MMHG | RESPIRATION RATE: 16 BRPM | HEIGHT: 62 IN | OXYGEN SATURATION: 98 % | BODY MASS INDEX: 22.08 KG/M2 | SYSTOLIC BLOOD PRESSURE: 182 MMHG | TEMPERATURE: 98.6 F | WEIGHT: 120 LBS | HEART RATE: 78 BPM

## 2025-07-27 DIAGNOSIS — S70.01XA CONTUSION OF RIGHT HIP, INITIAL ENCOUNTER: ICD-10-CM
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
## 2025-07-27 DIAGNOSIS — S60.221A CONTUSION OF RIGHT HAND, INITIAL ENCOUNTER: ICD-10-CM

## 2025-07-27 LAB
APPEARANCE UR: CLEAR
BILIRUB UR STRIP.AUTO-MCNC: NEGATIVE MG/DL
COLOR UR: COLORLESS
GLUCOSE UR STRIP.AUTO-MCNC: NORMAL MG/DL
KETONES UR STRIP.AUTO-MCNC: NEGATIVE MG/DL
LEUKOCYTE ESTERASE UR QL STRIP.AUTO: NEGATIVE
NITRITE UR QL STRIP.AUTO: NEGATIVE
PH UR STRIP.AUTO: 7 [PH]
PROT UR STRIP.AUTO-MCNC: NEGATIVE MG/DL
RBC # UR STRIP.AUTO: ABNORMAL MG/DL
RBC #/AREA URNS AUTO: NORMAL /HPF
SP GR UR STRIP.AUTO: 1
UROBILINOGEN UR STRIP.AUTO-MCNC: NORMAL MG/DL
WBC #/AREA URNS AUTO: NORMAL /HPF

## 2025-07-27 PROCEDURE — 99285 EMERGENCY DEPT VISIT HI MDM: CPT | Mod: 25

## 2025-07-27 PROCEDURE — 72125 CT NECK SPINE W/O DYE: CPT

## 2025-07-27 PROCEDURE — 70450 CT HEAD/BRAIN W/O DYE: CPT | Performed by: RADIOLOGY

## 2025-07-27 PROCEDURE — 81001 URINALYSIS AUTO W/SCOPE: CPT | Performed by: PHYSICIAN ASSISTANT

## 2025-07-27 PROCEDURE — 70450 CT HEAD/BRAIN W/O DYE: CPT

## 2025-07-27 PROCEDURE — 72125 CT NECK SPINE W/O DYE: CPT | Performed by: RADIOLOGY

## 2025-07-27 PROCEDURE — 73502 X-RAY EXAM HIP UNI 2-3 VIEWS: CPT | Mod: RIGHT SIDE | Performed by: RADIOLOGY

## 2025-07-27 PROCEDURE — 73130 X-RAY EXAM OF HAND: CPT | Mod: RT

## 2025-07-27 PROCEDURE — 73502 X-RAY EXAM HIP UNI 2-3 VIEWS: CPT | Mod: RT

## 2025-07-27 PROCEDURE — 2500000001 HC RX 250 WO HCPCS SELF ADMINISTERED DRUGS (ALT 637 FOR MEDICARE OP): Performed by: PHYSICIAN ASSISTANT

## 2025-07-27 PROCEDURE — 73130 X-RAY EXAM OF HAND: CPT | Mod: RIGHT SIDE | Performed by: RADIOLOGY

## 2025-07-27 RX ORDER — OXYCODONE AND ACETAMINOPHEN 5; 325 MG/1; MG/1
1 TABLET ORAL ONCE
Refills: 0 | Status: COMPLETED | OUTPATIENT
Start: 2025-07-27 | End: 2025-07-27

## 2025-07-27 RX ADMIN — OXYCODONE HYDROCHLORIDE AND ACETAMINOPHEN 1 TABLET: 5; 325 TABLET ORAL at 12:23

## 2025-07-27 ASSESSMENT — PAIN DESCRIPTION - LOCATION
LOCATION: BUTTOCKS
LOCATION_2: HAND
LOCATION: HIP

## 2025-07-27 ASSESSMENT — ENCOUNTER SYMPTOMS
COUGH: 0
HEMATURIA: 0
ABDOMINAL PAIN: 0
ARTHRALGIAS: 0
EYE PAIN: 0
FEVER: 0
PALPITATIONS: 0
DYSURIA: 0
SEIZURES: 0
COLOR CHANGE: 0
VOMITING: 0
CHILLS: 0
BACK PAIN: 0
SHORTNESS OF BREATH: 0
SORE THROAT: 0

## 2025-07-27 ASSESSMENT — PAIN SCALES - GENERAL
PAINLEVEL_OUTOF10: 9
PAINLEVEL_OUTOF10: 6
PAINLEVEL_OUTOF10: 8
PAINLEVEL_OUTOF10: 8

## 2025-07-27 ASSESSMENT — PAIN DESCRIPTION - DESCRIPTORS: DESCRIPTORS: TENDER

## 2025-07-27 ASSESSMENT — PAIN DESCRIPTION - ORIENTATION
ORIENTATION: RIGHT
ORIENTATION: RIGHT

## 2025-07-27 ASSESSMENT — PAIN - FUNCTIONAL ASSESSMENT
PAIN_FUNCTIONAL_ASSESSMENT: 0-10

## 2025-07-27 ASSESSMENT — PAIN DESCRIPTION - PAIN TYPE
TYPE: ACUTE PAIN
TYPE: ACUTE PAIN

## 2025-07-27 NOTE — ED PROVIDER NOTES
Patient is an 85-year-old female who presents with a mechanical fall that occurred yesterday.  Patient states that she was at [facility] and was walking toward an electric scooter when she lost her footing and states that she fell landing onto her right hand and right buttock.  She did not think she hit her head but patient's  believes that she did since her glasses had fallen off of her face.  Patient is on Plavix and takes an 81 mg baby aspirin daily.  She is complaining of right hand pain and right buttock pain.  She denies loss of consciousness, vomiting, chest pain, shortness of breath, abdominal pain, dizziness, or back pain.            Review of Systems   Constitutional:  Negative for chills and fever.   HENT:  Negative for ear pain and sore throat.    Eyes:  Negative for pain and visual disturbance.   Respiratory:  Negative for cough and shortness of breath.    Cardiovascular:  Negative for chest pain and palpitations.   Gastrointestinal:  Negative for abdominal pain and vomiting.   Genitourinary:  Negative for dysuria and hematuria.   Musculoskeletal:  Negative for arthralgias and back pain.        Right hand pain   Skin:  Negative for color change and rash.   Neurological:  Negative for seizures and syncope.   All other systems reviewed and are negative.       Physical Exam  Vitals and nursing note reviewed.   Constitutional:       General: She is not in acute distress.     Appearance: She is well-developed. She is not ill-appearing.   HENT:      Head: Normocephalic and atraumatic.     Eyes:      Extraocular Movements: Extraocular movements intact.      Conjunctiva/sclera: Conjunctivae normal.       Cardiovascular:      Rate and Rhythm: Normal rate and regular rhythm.      Heart sounds: No murmur heard.  Pulmonary:      Effort: Pulmonary effort is normal. No respiratory distress.      Breath sounds: Normal breath sounds. No stridor. No wheezing, rhonchi or rales.   Abdominal:      General: There is no  distension.      Palpations: Abdomen is soft. There is no mass.      Tenderness: There is no abdominal tenderness. There is no guarding or rebound.      Hernia: No hernia is present.     Musculoskeletal:         General: No swelling.      Right hand: Swelling and tenderness (3,4,and 5th fingers. Minimal range of motion) present. No deformity or lacerations. Decreased range of motion. Normal capillary refill. Normal pulse.      Cervical back: Normal range of motion and neck supple. No rigidity.      Comments: There is ecchymosis, swelling and decreased range of motion to the right 3rd, 4th, and 5th fingers. Distal pulses are strong and capillary refill is less than 2 seconds. Full ROM at the wrist.     Skin:     General: Skin is warm and dry.      Capillary Refill: Capillary refill takes less than 2 seconds.     Neurological:      Mental Status: She is alert.     Psychiatric:         Mood and Affect: Mood normal.          Labs Reviewed   URINALYSIS WITH REFLEX CULTURE AND MICROSCOPIC - Abnormal       Result Value    Color, Urine Colorless (*)     Appearance, Urine Clear      Specific Gravity, Urine 1.003 (*)     pH, Urine 7.0      Protein, Urine NEGATIVE      Glucose, Urine Normal      Blood, Urine 0.1 (1+) (*)     Ketones, Urine NEGATIVE      Bilirubin, Urine NEGATIVE      Urobilinogen, Urine Normal      Nitrite, Urine NEGATIVE      Leukocyte Esterase, Urine NEGATIVE     URINALYSIS MICROSCOPIC WITH REFLEX CULTURE - Normal    WBC, Urine NONE      RBC, Urine 3-5     URINALYSIS WITH REFLEX CULTURE AND MICROSCOPIC    Narrative:     The following orders were created for panel order Urinalysis with Reflex Culture and Microscopic.  Procedure                               Abnormality         Status                     ---------                               -----------         ------                     Urinalysis with Reflex C...[[id number]]  Abnormal            Final result               Extra Urine Gray Tube[[id number]]                                                          Please view results for these tests on the individual orders.   EXTRA URINE GRAY TUBE        CT head wo IV contrast   Final Result   1. No acute intracranial abnormality.        2. Mild periventricular and subcortical white matter changes likely   related to chronic small-vessel ischemic change.             MACRO:   None        Signed by: Mari Mcgrath 7/27/2025 1:40 PM   Dictation workstation:   [id number]      CT cervical spine wo IV contrast   Final Result   1. No acute osseous abnormality cervical spine.        2. Degenerative discogenic changes with a component of interbody   osseous fusion at C5/6. No canal stenosis.             MACRO:   None        Signed by: Mari Mcgrath 7/27/2025 1:48 PM   Dictation workstation:   [id number]      XR hand right 3+ views   Final Result   No fracture or dislocation is evident.        Signed by: Wesly Kaye 7/27/2025 12:24 PM   Dictation workstation:   [id number]      XR hip right with pelvis when performed 2 or 3 views   Final Result   No osseous injury is evident.        Signed by: Wesly Kaye 7/27/2025 12:22 PM   Dictation workstation:   [id number]           Procedures     Medical Decision Making  Patient is an 85-year-old female who presents to the emergency room after mechanical fall that occurred yesterday while at [facility] in which she lost her balance causing her to fall landing onto her right hand and right buttocks.  Patient has swelling, ecchymosis, and decreased range of motion to her right hand mainly to her 3rd, 4th, and 5th digits.  X-ray of the right hand shows no acute fracture or dislocation.  Patient was also complaining of right hip and buttock pain.  X-ray of the right hip with pelvis performed shows no acute osseous abnormality.  Patient has mild ecchymosis about 4x5cm in size to her right buttock.  No evidence of hematoma at this time.  CT scan of the head and cervical spine performed  given the uncertainty as to whether or not the patient hit her head and her being on Plavix.  CT scan of the head and cervical spine are unremarkable for acute process.  In addition, patient has gotten up to use the restroom multiple times during her ED stay; UA ordered and shows no signs of infection. Patient will be discharged home with recommended follow-up with PCP and return for any new or worsening symptoms.    Differential diagnosis includes but not limited to contusion, fracture, sprain, dislocation        Amount and/or Complexity of Data Reviewed  Radiology: ordered and independent interpretation performed. Decision-making details documented in ED Course.     Details: X-ray of the right hand as interpreted by myself shows no fracture or dislocation    X-ray of the right hip with pelvis shows no fracture or dislocation per my interpretation    Risk  OTC drugs.         ED Course as of 07/27/25 1355   Sun Jul 27, 2025   1320 XR hand right 3+ views [KM]      ED Course User Index  [KM] Reyna Gaspar PA-C         Diagnoses as of 07/27/25 1355   Fall, initial encounter   Contusion of right hand, initial encounter   Contusion of right hip, initial encounter                    Reyna Gaspar PA-C  07/27/25 1356

## 2025-08-01 ENCOUNTER — TELEPHONE (OUTPATIENT)
Dept: PRIMARY CARE | Facility: CLINIC | Age: 85
End: 2025-08-01
Payer: MEDICARE

## 2025-08-01 DIAGNOSIS — J40 BRONCHITIS: ICD-10-CM

## 2025-08-01 DIAGNOSIS — M48.061 SPINAL STENOSIS OF LUMBAR REGION, UNSPECIFIED WHETHER NEUROGENIC CLAUDICATION PRESENT: ICD-10-CM

## 2025-08-01 DIAGNOSIS — I25.118 CORONARY ARTERY DISEASE OF NATIVE ARTERY OF NATIVE HEART WITH STABLE ANGINA PECTORIS: ICD-10-CM

## 2025-08-01 RX ORDER — CEPHALEXIN 500 MG/1
500 CAPSULE ORAL 2 TIMES DAILY
Qty: 20 CAPSULE | Refills: 0 | OUTPATIENT
Start: 2025-08-01 | End: 2025-08-11

## 2025-08-01 RX ORDER — ATORVASTATIN CALCIUM 40 MG/1
40 TABLET, FILM COATED ORAL DAILY
Qty: 100 TABLET | Refills: 0 | OUTPATIENT
Start: 2025-08-01

## 2025-08-07 RX ORDER — OXYCODONE HYDROCHLORIDE 10 MG/1
10 TABLET ORAL 3 TIMES DAILY
Qty: 90 TABLET | Refills: 0 | Status: SHIPPED | OUTPATIENT
Start: 2025-08-07 | End: 2025-09-06

## 2025-09-23 ENCOUNTER — APPOINTMENT (OUTPATIENT)
Dept: CARDIOLOGY | Facility: CLINIC | Age: 85
End: 2025-09-23
Payer: MEDICARE

## 2025-09-30 ENCOUNTER — APPOINTMENT (OUTPATIENT)
Dept: CARDIOLOGY | Facility: CLINIC | Age: 85
End: 2025-09-30
Payer: MEDICARE

## 2025-10-20 ENCOUNTER — APPOINTMENT (OUTPATIENT)
Dept: PRIMARY CARE | Facility: CLINIC | Age: 85
End: 2025-10-20
Payer: MEDICARE

## (undated) DEVICE — SHEATH, GLIDESHEATH, SLENDER, 6FR 10CM

## (undated) DEVICE — SHEATH, GLIDESHEATH, SLENDER, 5FR 10CM

## (undated) DEVICE — ELECTRODE, QUICK-PACE, EDGE RTS, COMBO

## (undated) DEVICE — CATHETER, BALLOON DILATION, EUPHORA SEMICOMPLIANT 2.0  X 12 MM X 142CM

## (undated) DEVICE — DEVICE KIT, INFLATION, CUSTOM, PARMA

## (undated) DEVICE — TR BAND, RADIAL COMPRESSION, STANDARD, 24CM

## (undated) DEVICE — ACCESS KIT, S-MAK MINI, 4FR 10CM 0.018IN 40CM, NT/PT, ECHO ENHANCE NEEDLE

## (undated) DEVICE — CATHETER, OPTITORQUE, 5FR, TIG, 1H/100CM

## (undated) DEVICE — CLOSURE SYSTEM, VASCULAR, VASCADE, 5 F

## (undated) DEVICE — VALVE, HEMOSTASIS, GUARDIAN II NC, W/ GUIDEWIRE INSERTION TOOL

## (undated) DEVICE — SHEATH, PINNACLE, 10 CM,  5FR INTRODUCER, 5FR DIA, 2.5 CM DIALATOR

## (undated) DEVICE — Device

## (undated) DEVICE — CATHETER, GUIDING, SHERPA ACTIVE, 5 FR JR 4.0

## (undated) DEVICE — CATHETER, GUIDING LAUNCHER 5FR EBU 3.0 LEFT

## (undated) DEVICE — GUIDEWIRE, UNIVERSAL BALANCE MID WEIGHT, 190CM, STR

## (undated) DEVICE — GUIDEWIRE, HI-TORQUE, VERSACORE, 145CM, FLOPPY

## (undated) DEVICE — CATHETER, IVUS, OPTICROSS HD IMAGING, 5FR

## (undated) DEVICE — CATHETER, BALLOON, NC EUPHORA NONCOMPLIANT 2.5 X 15 X 142CM

## (undated) DEVICE — CATHETER, BALLOON, NC EUPHORA NONCOMPLIANT 3.0 X 12 X 142CM